# Patient Record
Sex: MALE | Race: WHITE | NOT HISPANIC OR LATINO | Employment: OTHER | URBAN - METROPOLITAN AREA
[De-identification: names, ages, dates, MRNs, and addresses within clinical notes are randomized per-mention and may not be internally consistent; named-entity substitution may affect disease eponyms.]

---

## 2017-02-21 ENCOUNTER — OFFICE VISIT (OUTPATIENT)
Dept: MEDICAL GROUP | Facility: MEDICAL CENTER | Age: 72
End: 2017-02-21
Payer: MEDICARE

## 2017-02-21 VITALS
BODY MASS INDEX: 25.31 KG/M2 | WEIGHT: 191 LBS | SYSTOLIC BLOOD PRESSURE: 136 MMHG | DIASTOLIC BLOOD PRESSURE: 80 MMHG | HEIGHT: 73 IN | TEMPERATURE: 97.9 F | OXYGEN SATURATION: 95 % | HEART RATE: 83 BPM

## 2017-02-21 DIAGNOSIS — R94.5 ABNORMAL LIVER FUNCTION: ICD-10-CM

## 2017-02-21 DIAGNOSIS — E11.9 CONTROLLED TYPE 2 DIABETES MELLITUS WITHOUT COMPLICATION, WITH LONG-TERM CURRENT USE OF INSULIN (HCC): Chronic | ICD-10-CM

## 2017-02-21 DIAGNOSIS — I10 ESSENTIAL HYPERTENSION: Chronic | ICD-10-CM

## 2017-02-21 DIAGNOSIS — Z87.19 HISTORY OF GASTROINTESTINAL BLEEDING: ICD-10-CM

## 2017-02-21 DIAGNOSIS — Z79.4 CONTROLLED TYPE 2 DIABETES MELLITUS WITHOUT COMPLICATION, WITH LONG-TERM CURRENT USE OF INSULIN (HCC): Chronic | ICD-10-CM

## 2017-02-21 PROCEDURE — 3045F PR MOST RECENT HEMOGLOBIN A1C LEVEL 7.0-9.0%: CPT | Performed by: INTERNAL MEDICINE

## 2017-02-21 PROCEDURE — G8484 FLU IMMUNIZE NO ADMIN: HCPCS | Performed by: INTERNAL MEDICINE

## 2017-02-21 PROCEDURE — 1101F PT FALLS ASSESS-DOCD LE1/YR: CPT | Performed by: INTERNAL MEDICINE

## 2017-02-21 PROCEDURE — 3017F COLORECTAL CA SCREEN DOC REV: CPT | Mod: 1P | Performed by: INTERNAL MEDICINE

## 2017-02-21 PROCEDURE — 92250 FUNDUS PHOTOGRAPHY W/I&R: CPT | Mod: TC | Performed by: INTERNAL MEDICINE

## 2017-02-21 PROCEDURE — G8432 DEP SCR NOT DOC, RNG: HCPCS | Performed by: INTERNAL MEDICINE

## 2017-02-21 PROCEDURE — 1036F TOBACCO NON-USER: CPT | Performed by: INTERNAL MEDICINE

## 2017-02-21 PROCEDURE — G8420 CALC BMI NORM PARAMETERS: HCPCS | Performed by: INTERNAL MEDICINE

## 2017-02-21 PROCEDURE — 4040F PNEUMOC VAC/ADMIN/RCVD: CPT | Performed by: INTERNAL MEDICINE

## 2017-02-21 PROCEDURE — 99214 OFFICE O/P EST MOD 30 MIN: CPT | Performed by: INTERNAL MEDICINE

## 2017-02-21 NOTE — MR AVS SNAPSHOT
"        Vinod Pablo Houston   2017 8:00 AM   Office Visit   MRN: [id number]    Department:  [facility]   Dept Phone:  [phone]    Description:  Male : 1945   Provider:  Jamal Nieves M.D.           Allergies as of 2017     Allergen Noted Reactions    Aspirin 2013   Anaphylaxis    INTERNAL BLEEDING    Augmentin 2010   Vomiting    Clindamycin 2011       Develop c.diff    Metronidazole 2013       Not sure    Nsaids 2009         You were diagnosed with     Controlled type 2 diabetes mellitus without complication, with long-term current use of insulin (CMS-HCC)   [6848591]       Essential hypertension   [8534735]       Abnormal liver function   [097640]       History of gastrointestinal bleeding   [881724]         Vital Signs     Blood Pressure Pulse Temperature Height Weight Body Mass Index    136/80 mmHg 83 36.6 °C (97.9 °F) 1.854 m (6' 0.99\") 86.637 kg (191 lb) 25.20 kg/m2    Oxygen Saturation Smoking Status                95% Former Smoker          Basic Information     Date Of Birth Sex Race Ethnicity Preferred Language    1945 Male White Non- English      Problem List              ICD-10-CM Priority Class Noted - Resolved    Hypertension (Chronic) I10   2009 - Present    Dyslipidemia (Chronic) E78.5   2009 - Present    Diabetes mellitus type 2, controlled (CMS-HCC) (Chronic) E11.9   2009 - Present    History of gastrointestinal bleeding Z87.19   2009 - Present    S/P knee surgery Z98.890   2009 - Present    Preventative health care (Chronic) Z00.00   2009 - Present    Retinal detachment H33.20   2009 - Present    History of Clostridium difficile infection Z86.19   2011 - Present    Arthritis (Chronic) M19.90   2012 - Present    History of cervical spinal surgery Z98.890   2013 - Present    Low back pain M54.5   2013 - Present    Erectile dysfunction N52.9   2014 - Present   " Abnormal liver function K76.89   8/9/2015 - Present    Hearing loss of aging H91.10   9/8/2015 - Present      Health Maintenance        Date Due Completion Dates    RETINAL SCREENING 3/10/1962 ---    IMM ZOSTER VACCINE 3/10/2004 ---    IMM DTaP/Tdap/Td Vaccine (1 - Tdap) 4/6/2010 4/5/2010    IMM PNEUMOCOCCAL 65+ (ADULT) LOW/MEDIUM RISK SERIES (2 of 2 - PCV13) 9/30/2011 9/30/2010    IMM INFLUENZA (1) 9/1/2016 ---    A1C SCREENING 4/18/2017 10/18/2016, 8/6/2015, 9/24/2014, 1/20/2014, 9/20/2013, 12/3/2012, 11/6/2011, 10/14/2010, 3/5/2010, 9/2/2009    FASTING LIPID PROFILE 10/18/2017 10/18/2016, 8/6/2015, 9/24/2014, 9/20/2013, 12/3/2012, 10/14/2010, 9/2/2009, 8/15/2008    URINE ACR / MICROALBUMIN 10/18/2017 10/18/2016, 1/20/2014, 9/2/2009, 8/15/2008    SERUM CREATININE 10/18/2017 10/18/2016, 10/22/2015, 8/6/2015, 9/24/2014, 1/20/2014, 9/20/2013, 5/17/2013, 12/3/2012, 11/7/2011, 11/6/2011, 11/5/2011, 10/14/2010, 3/5/2010, 9/2/2009, 1/12/2009, 8/15/2008    DIABETES MONOFILAMENT / LE EXAM 10/20/2017 10/20/2016, 8/25/2015, 9/8/2014, 9/12/2013 (Done)    Override on 9/12/2013: Done    COLONOSCOPY 8/25/2025 8/25/2015 (Declined)    Override on 8/25/2015: Patient Declined            Current Immunizations     Pneumococcal polysaccharide vaccine (PPSV-23) 9/30/2010, 9/30/2010    TD Vaccine 4/5/2010  4:20 PM      Below and/or attached are the medications your provider expects you to take. Review all of your home medications and newly ordered medications with your provider and/or pharmacist. Follow medication instructions as directed by your provider and/or pharmacist. Please keep your medication list with you and share with your provider. Update the information when medications are discontinued, doses are changed, or new medications (including over-the-counter products) are added; and carry medication information at all times in the event of emergency situations     Allergies:  ASPIRIN - Anaphylaxis     AUGMENTIN - Vomiting      CLINDAMYCIN - (reactions not documented)     METRONIDAZOLE - (reactions not documented)     NSAIDS - (reactions not documented)               Medications  Valid as of: February 21, 2017 -  9:49 AM    Generic Name Brand Name Tablet Size Instructions for use    Amlodipine Besy-Benazepril HCl (Cap) LOTREL 5-20 MG Take 1 Cap by mouth every day.        Atorvastatin Calcium (Tab) LIPITOR 20 MG Take 1 Tab by mouth every day.        Blood Glucose Monitoring Suppl (Device) Blood Glucose Monitoring Suppl  One Touch ultra glucometer, check blood sugar once a day and record before meal, E11.9        Blood Glucose Monitoring Suppl (Misc) Blood Glucose Monitoring Suppl SUPPLIES One touch ultra glucometer test strips, check blood sugar once a day before meal and record, E11.9        Lancets (Misc) Lancets  Check blood sugar once a day before meals and record, E11.9        Triamterene-HCTZ (Cap) MAXZIDE-25/DYAZIDE 37.5-25 MG Take 1 Cap by mouth every morning.        .                 Medicines prescribed today were sent to:     [id number] [place] [facility] [address] [email or web address]    [address] [place] [address]    Phone: [phone] Fax: [phone]    Open 24 Hours?: No      Medication refill instructions:       If your prescription bottle indicates you have medication refills left, it is not necessary to call your provider’s office. Please contact your pharmacy and they will refill your medication.    If your prescription bottle indicates you do not have any refills left, you may request refills at any time through one of the following ways: The online [facility] system (except Urgent Care), by calling your provider’s office, or by asking your pharmacy to contact your provider’s office with a refill request. Medication refills are processed only during regular business hours and may not be available until the next business day. Your provider may request additional information or to have a follow-up visit with you prior to  refilling your medication.   *Please Note: Medication refills are assigned a new Rx number when refilled electronically. Your pharmacy may indicate that no refills were authorized even though a new prescription for the same medication is available at the pharmacy. Please request the medicine by name with the pharmacy before contacting your provider for a refill.        Your To Do List     Future Labs/Procedures Complete By Expires    COMP METABOLIC PANEL  As directed 2/22/2018    FERRITIN  As directed 2/22/2018    HEMOGLOBIN A1C  As directed 2/22/2018    HEPATITIS PANEL ACUTE ([facility])  As directed 2/22/2018    IRON/TOTAL IRON BIND  As directed 2/22/2018    MICROALBUMIN CREAT RATIO URINE  As directed 2/22/2018    SERUM PROTEIN ELECTROPHORESIS  As directed 2/22/2018    URINALYSIS,CULTURE IF INDICATED  As directed 2/22/2018      Other Notes About Your Plan     10/18/16 AST 43,ALT 62, prot,A1c 7%  1/17 repeat A1c, secondary liver enzyme workup, repeat UA                               MyChart Status: Patient Declined

## 2017-02-21 NOTE — PROGRESS NOTES
Subjective:      Vinod Conrad is a 71 y.o. male who presents with diabetes          HPI     Here for follow diabetes, checking blood sugars once daily running 107-120 at home, once per day, has been to diabetic education classes, has improved his diet, limiting carbohydrate portion sizes, eating healthier, limiting sweets and candies, 2 drinks of alcohol per day  Blood pressure running 130/80 on lotrel and maxzide no lightheadedness or dizziness  Exercising on bike 5 miles per day, no chest pain, shortness of breath, lightheadedness, syncope, palpitations with activity  Dyslipidemia on Lipitor no muscle pain or muscle aches   Not on aspirin because of history of GI bleed, no further melena, hematochezia, no NSAIDs  No tobacco  etoh 1-2 per day            Current Outpatient Prescriptions   Medication Sig Dispense Refill   • atorvastatin (LIPITOR) 20 MG Tab Take 1 Tab by mouth every day. 90 Tab 3   • amlodipine-benazepril (LOTREL) 5-20 MG per capsule Take 1 Cap by mouth every day. 90 Cap 3   • Lancets Misc Check blood sugar once a day before meals and record, E11.9 50 Each 11   • Blood Glucose Monitoring Suppl Device One Touch ultra glucometer, check blood sugar once a day and record before meal, E11.9 1 Device 0   • Blood Glucose Monitoring Suppl SUPPLIES Misc One touch ultra glucometer test strips, check blood sugar once a day before meal and record, E11.9 50 Strip 11   • triamterene/hctz (MAXZIDE-25/DYAZIDE) 37.5-25 MG Cap Take 1 Cap by mouth every morning. 90 Cap 3   • atorvastatin (LIPITOR) 20 MG Tab Take 1 Tab by mouth every day. 90 Tab 3   • amlodipine-benazepril (LOTREL) 5-20 MG per capsule Take 1 Cap by mouth every day. 90 Cap 3     No current facility-administered medications for this visit.     Patient Active Problem List    Diagnosis Date Noted   • Hearing loss of aging 09/08/2015   • Abnormal liver function 08/09/2015   • Erectile dysfunction 09/08/2014   • Low back pain 09/25/2013   • History of  cervical spinal surgery 05/13/2013   • Arthritis 11/13/2012   • History of Clostridium difficile infection 11/17/2011   • Hypertension 09/12/2009   • Dyslipidemia 09/12/2009   • Diabetes mellitus type 2, controlled (CMS-[facility]) 09/12/2009   • History of gastrointestinal bleeding 09/12/2009   • S/P knee surgery 09/12/2009   • Preventative health care 09/12/2009   • Retinal detachment 09/12/2009         Status post knee surgery  1990 and 2005 left knee meniscus  12/12 [facility] note; x-ray left knee severe DJD, x-ray right knee joint narrowing medial, lateral, patellofemoral compartment, status post bilateral knee injection    Preventive health  2007 ? Colon [place] state repeat 2015?  4/5/10 td  9/30/10 pneumovax  2010 eye exam  8/25/15 zoster vaccine written to get at pharmacy  8/25/15 prevnar written to get at pharmacy  8/25/15 declines colon  11/5/15 declines flu shot  10/18/16 psa 2.2  10/18/16 vit d 31    Low back pain  9/25/13 xray right hip mild DJD  9/25/13 xray lumbar spine facet disease most prominent at L4-5 and L5-S1 on the right, mild degenerative change in the SI joints  9/25/13 MRI lumbar spine L3-L4 circumferential disc bulge, bilateral facet arthropathy narrowing thecal sac to 7 mm, L4-L5 concentric disc bulge narrowing thecal sac to 6 mm, moderate bilateral foraminal narrowing right greater than left  9/25/13 patient's symptoms improving, declines physiatry,proceed with physical therapy at [facility] physical therapy    Hypertension  8/08 urine mac 11  9/09 urine mac 14  11/11 on lotrel 5/20 mg and diazide  1/20/14 urine mac 8, on lotrel 5/20 mg and diazide    10/18/16 urine mac 10 on lotrel 5/20 mg and diazide     History GI bleed  6/07 UGI bleed and hemoperitoneum, s/p exploratory laparotomy and evacuation hemoperitoneum, no obvious source of bleeding this was done in [place], prior to this he had been taking aspirin and anti-inflammatories thus was told to discontinue    History C. difficile  infection  11/3/11 c.diff stool positive start flagyl  11/8/11 on vancomycin on discharge from hospital, completed 2 weeks  11/30/11 stool negative for c.diff toxin    History cervical spine surgery  5/13/13 MRI cervical spine C3-C4 mild DJD, C5-C6 eccentric spurring and disc bulge with effacement of CSF space right of midline, bilateral uncinate and facet degeneration, C6-C7 large right central disc extrusion and severe right foraminal narrowing  5/17/13  neurosurgery note surgery recommended  5/20/13  C6-C7 foraminotomy, excision herniated disc, decompression C7 nerve    Hearing loss  8/31/15  ENT note age-related hearing loss, recommend hearing aids    Dyslipidemia  8/08 chol 172,trig 49,hdl 64,ldl 98  9/09 chol 192,trig 98,hdl 59,ldl 113  10/10 chol 164,trig 76,hdl 50,ldl 99 lipitor 20 mg  11/12 chol 176,trig 99,hdl 55,ldl 101; cpk 74,crp 7.8 on lipitor 20 mg  9/20/13 chol 150,trig 92,hdl 42,ldl 90 on lipitor 20 mg  9/24/14 chol 161,trig 90,hdl 43,ldl 100 on lipitor 20 mg  8/8/15 chol 167,trig 86,hdl 44,ldl 106 on lipitor 20 mg  10/18/16 chol 164,trig 107,hdl 41,ldl 102 on lipitor 20 mg    Diabetes  8/08 bs 129  9/09 bs 143,A1c 7%, declines more diabetic education like to hold off on checking blood sugars for now recheck labs of December if still elevated start metformin  3/10 A1c 6.2%  10/10 A1c 6.4%,bs 129  11/11 A1c 6.2% no meds  11/12 A1c 6.6%,bs 147 no meds  9/20/13 A1c 7.0 %,bs 118 diet controlled  11/15/13 declines to check blood sugar, declines glucometer, declines diabetic education.  1/20/14 A1c 6.4% diet controlled  9/25/14 A1c 6.0% diet controlled  8/8/15 A1c 6.7% diet controlled, on lotrel, lipitor, no asa secondary to previous GI bleed  10/18/16 A1c 7%  11/15/16 diabetic education performed    Arthritis  2007 saw a rheumatologist in [place], multiple x-rays and labs negative, told osteoarthritis, was taking aspirin up to 9 tablets a day and developed GI bleed  11/12  "osteoathritis knees, shoulders, hands, trial of tramadol  11/12 crp 7.8,ESR 1,cpk 74,uric 6.8,CCP 8  12/12 [facility] note; x-ray left knee severe DJD, x-ray right knee joint narrowing medial, lateral, patellofemoral compartment, status post bilateral knee injection    Abn liver enzymes  9/24/14 AST 45,ALT 65  8/8/15 AST 29,ALT 55,GGT 42,iron 111,%sat 29,ferritin 261,SPEP negative,hep panel negative; 2 drinks alcohol daily, tylenol 4 tablets daily  10/22/15 AST 32,ALT 33  10/18/16 AST 43,ALT 62        ROS       Objective:     /80 mmHg  Pulse 83  Temp(Src) 36.6 °C (97.9 °F)  Resp 95  Ht 1.854 m (6' 1\")  Wt 86.637 kg (191 lb)  BMI 25.20 kg/m2     Physical Exam   Constitutional: He appears well-developed and well-nourished. No distress.   HENT:   Head: Normocephalic and atraumatic.   Mouth/Throat: Oropharynx is clear and moist.   Eyes: Conjunctivae are normal. Right eye exhibits no discharge. Left eye exhibits no discharge. No scleral icterus.   Neck: Neck supple. No JVD present. No thyromegaly present.   Cardiovascular: Normal rate and regular rhythm.    No murmur heard.  Pulmonary/Chest: Effort normal and breath sounds normal. No respiratory distress. He has no wheezes.   Abdominal: Soft.   Musculoskeletal: He exhibits no edema.   Neurological: He is alert.   Skin: Skin is warm. He is not diaphoretic.   Psychiatric: He has a normal mood and affect. His behavior is normal.   Nursing note and vitals reviewed.              Assessment/Plan:     Assessment  #!  Diabetes mellitus diet controlled, blood sugars improved, has been to diabetic education, checking once a day, limiting sweets and candies, exercising daily, no history of retinopathy, neuropathy, nephropathy    #2 hypertension stable and controlled on Lotrel and Maxzide    #3 dyslipidemia on Lipitor no muscle pain or aches    #4 history of GI bleed unable to take aspirin daily    #5 abnormal liver enzymes likely secondary to alcohol most recent labs 10/18/16 " AST 43,ALT 62    #6 proteinuria microscopic question related to hypertension or diabetes, normal BUN and creatinine, no regular NSAIDs      Plan  #1 point-of-care retina examination    #2 labs repeat    #3 limit alcohol may impact liver enzymes    #4 nutrition and exercise discussed    #5 continue check blood pressure regularly and record, check blood sugar once a day regularly and record    #6 follow up 6 months    #7 check feet daily    #8 annual eye exam    #9 unable to take aspirin because of history of GI bleed

## 2017-02-24 ENCOUNTER — TELEPHONE (OUTPATIENT)
Dept: MEDICAL GROUP | Facility: MEDICAL CENTER | Age: 72
End: 2017-02-24

## 2017-02-24 NOTE — TELEPHONE ENCOUNTER
----- Message from Jamal Nieves M.D. sent at 2/23/2017 11:06 PM PST -----  Please notify patient that his digital retina photo is negative for diabetic eye disease

## 2017-02-28 ENCOUNTER — HOSPITAL ENCOUNTER (OUTPATIENT)
Dept: LAB | Facility: MEDICAL CENTER | Age: 72
End: 2017-02-28
Attending: INTERNAL MEDICINE
Payer: MEDICARE

## 2017-02-28 LAB
ALBUMIN SERPL BCP-MCNC: 4.3 G/DL (ref 3.2–4.9)
ALBUMIN/GLOB SERPL: 1.4 G/DL
ALP SERPL-CCNC: 60 U/L (ref 30–99)
ALT SERPL-CCNC: 29 U/L (ref 2–50)
ANION GAP SERPL CALC-SCNC: 9 MMOL/L (ref 0–11.9)
APPEARANCE UR: CLEAR
AST SERPL-CCNC: 22 U/L (ref 12–45)
BILIRUB SERPL-MCNC: 0.8 MG/DL (ref 0.1–1.5)
BILIRUB UR QL STRIP.AUTO: NEGATIVE
BUN SERPL-MCNC: 19 MG/DL (ref 8–22)
CALCIUM SERPL-MCNC: 10.3 MG/DL (ref 8.5–10.5)
CHLORIDE SERPL-SCNC: 105 MMOL/L (ref 96–112)
CO2 SERPL-SCNC: 28 MMOL/L (ref 20–33)
COLOR UR AUTO: NORMAL
CREAT SERPL-MCNC: 0.88 MG/DL (ref 0.5–1.4)
CREAT UR-MCNC: 133.2 MG/DL
CULTURE IF INDICATED INDCX: NO UA CULTURE
EST. AVERAGE GLUCOSE BLD GHB EST-MCNC: 134 MG/DL
FERRITIN SERPL-MCNC: 208.1 NG/ML (ref 22–322)
GLOBULIN SER CALC-MCNC: 3.1 G/DL (ref 1.9–3.5)
GLUCOSE SERPL-MCNC: 134 MG/DL (ref 65–99)
GLUCOSE UR STRIP.AUTO-MCNC: NEGATIVE MG/DL
HAV IGM SERPL QL IA: NEGATIVE
HBA1C MFR BLD: 6.3 % (ref 0–5.6)
HBV CORE IGM SERPL QL IA: NEGATIVE
HBV SURFACE AG SERPL QL IA: NEGATIVE
HCV AB S/CO SERPL IA: NEGATIVE
IRON SATN MFR SERPL: 28 % (ref 15–55)
IRON SERPL-MCNC: 114 UG/DL (ref 50–180)
KETONES UR STRIP.AUTO-MCNC: NEGATIVE MG/DL
LEUKOCYTE ESTERASE UR QL STRIP.AUTO: NEGATIVE
MICRO URNS: NORMAL
MICROALBUMIN UR-MCNC: 1.1 MG/DL
MICROALBUMIN/CREAT UR: 8 MG/G (ref 0–30)
NITRITE UR QL STRIP.AUTO: NEGATIVE
PH UR: 6 [PH]
POTASSIUM SERPL-SCNC: 3.9 MMOL/L (ref 3.6–5.5)
PROT SERPL-MCNC: 7.4 G/DL (ref 6–8.2)
PROT UR QL STRIP: NEGATIVE MG/DL
RBC UR QL AUTO: NEGATIVE
SODIUM SERPL-SCNC: 142 MMOL/L (ref 135–145)
SP GR UR STRIP.AUTO: 1.01
TIBC SERPL-MCNC: 402 UG/DL (ref 250–450)

## 2017-02-28 PROCEDURE — 83540 ASSAY OF IRON: CPT

## 2017-02-28 PROCEDURE — 84165 PROTEIN E-PHORESIS SERUM: CPT

## 2017-02-28 PROCEDURE — 82043 UR ALBUMIN QUANTITATIVE: CPT

## 2017-02-28 PROCEDURE — 84160 ASSAY OF PROTEIN ANY SOURCE: CPT

## 2017-02-28 PROCEDURE — 80074 ACUTE HEPATITIS PANEL: CPT

## 2017-02-28 PROCEDURE — 82728 ASSAY OF FERRITIN: CPT

## 2017-02-28 PROCEDURE — 36415 COLL VENOUS BLD VENIPUNCTURE: CPT

## 2017-02-28 PROCEDURE — 81003 URINALYSIS AUTO W/O SCOPE: CPT

## 2017-02-28 PROCEDURE — 83036 HEMOGLOBIN GLYCOSYLATED A1C: CPT | Mod: GA

## 2017-02-28 PROCEDURE — 82570 ASSAY OF URINE CREATININE: CPT

## 2017-02-28 PROCEDURE — 83550 IRON BINDING TEST: CPT

## 2017-02-28 PROCEDURE — 80053 COMPREHEN METABOLIC PANEL: CPT

## 2017-03-01 ENCOUNTER — TELEPHONE (OUTPATIENT)
Dept: MEDICAL GROUP | Facility: MEDICAL CENTER | Age: 72
End: 2017-03-01

## 2017-03-01 NOTE — TELEPHONE ENCOUNTER
----- Message from Jamal Nieves M.D. sent at 3/1/2017  2:10 AM PST -----  Please notify patient that  (1) his diabetes blood sugar average A1c is 6.3% which is much better from his previous test 4 months ago indicating better blood sugar control, continue his current diet and exercise program  (2) his liver function is normal again, his kidney function and urine test are normal  (3) we can repeat his blood test in october

## 2017-03-03 LAB
ALBUMIN SERPL-MCNC: 4.38 G/DL (ref 3.75–5.01)
ALPHA1 GLOB SERPL ELPH-MCNC: 0.29 G/DL (ref 0.19–0.46)
ALPHA2 GLOB SERPL ELPH-MCNC: 0.77 G/DL (ref 0.48–1.05)
B-GLOBULIN SERPL ELPH-MCNC: 0.8 G/DL (ref 0.48–1.1)
EER PROT ELECT SER Q1092: NORMAL
GAMMA GLOB SERPL ELPH-MCNC: 1.06 G/DL (ref 0.62–1.51)
INTERPRETATION SERPL IFE-IMP: NORMAL
PROT SERPL-MCNC: 7.3 G/DL (ref 6–8.3)

## 2017-09-26 ENCOUNTER — TELEPHONE (OUTPATIENT)
Dept: MEDICAL GROUP | Facility: MEDICAL CENTER | Age: 72
End: 2017-09-26

## 2017-09-26 DIAGNOSIS — Z79.4 CONTROLLED TYPE 2 DIABETES MELLITUS WITHOUT COMPLICATION, WITH LONG-TERM CURRENT USE OF INSULIN (HCC): Chronic | ICD-10-CM

## 2017-09-26 DIAGNOSIS — Z12.5 PROSTATE CANCER SCREENING: ICD-10-CM

## 2017-09-26 DIAGNOSIS — E11.9 CONTROLLED TYPE 2 DIABETES MELLITUS WITHOUT COMPLICATION, WITH LONG-TERM CURRENT USE OF INSULIN (HCC): Chronic | ICD-10-CM

## 2017-09-26 DIAGNOSIS — E55.9 HYPOVITAMINOSIS D: ICD-10-CM

## 2017-09-26 DIAGNOSIS — E78.5 DYSLIPIDEMIA: Chronic | ICD-10-CM

## 2017-09-26 NOTE — TELEPHONE ENCOUNTER
Please notify patient that his pharmacy has sent us a notification that the medication he is taking, the triamterene/hydrochlorothiazide, is no longer available, we can change the patient to hydrochlorothiazide which would still be one tablet a day, if he is in agreement please let me know.    We will need to check his blood tests in 4 weeks after the change of the medication.

## 2017-09-27 RX ORDER — HYDROCHLOROTHIAZIDE 25 MG/1
25 TABLET ORAL DAILY
Qty: 90 TAB | Refills: 3 | Status: SHIPPED | OUTPATIENT
Start: 2017-09-27 | End: 2017-09-28

## 2017-09-27 NOTE — TELEPHONE ENCOUNTER
Patient called back again and left a message, could you please call him again and check on his question?

## 2017-09-28 RX ORDER — TRIAMTERENE AND HYDROCHLOROTHIAZIDE 37.5; 25 MG/1; MG/1
1 TABLET ORAL DAILY
Qty: 90 TAB | Refills: 3 | Status: ON HOLD | OUTPATIENT
Start: 2017-09-28 | End: 2018-08-17

## 2017-09-28 NOTE — TELEPHONE ENCOUNTER
Please notify patient that the triamterene hydrochlorothiazide tablet prescription was sent to his pharmacy, we will cancel the other prescription

## 2017-09-28 NOTE — TELEPHONE ENCOUNTER
Pt is requesting to stay on the previous medication and states his Pharmacy does have this medication but in Tablet form. [facility] will need a new Rx to show Tablet form for Triamterene/Hydrochlorothiazide.

## 2018-01-15 ENCOUNTER — OFFICE VISIT (OUTPATIENT)
Dept: URGENT CARE | Facility: CLINIC | Age: 73
End: 2018-01-15
Payer: MEDICARE

## 2018-01-15 VITALS
SYSTOLIC BLOOD PRESSURE: 142 MMHG | HEIGHT: 72 IN | RESPIRATION RATE: 14 BRPM | BODY MASS INDEX: 27.5 KG/M2 | WEIGHT: 203 LBS | HEART RATE: 73 BPM | TEMPERATURE: 98.2 F | OXYGEN SATURATION: 96 % | DIASTOLIC BLOOD PRESSURE: 72 MMHG

## 2018-01-15 DIAGNOSIS — L03.211 CELLULITIS, FACE: ICD-10-CM

## 2018-01-15 PROCEDURE — 99214 OFFICE O/P EST MOD 30 MIN: CPT | Performed by: NURSE PRACTITIONER

## 2018-01-15 RX ORDER — SULFAMETHOXAZOLE AND TRIMETHOPRIM 800; 160 MG/1; MG/1
1 TABLET ORAL 2 TIMES DAILY
Qty: 14 TAB | Refills: 0 | Status: SHIPPED | OUTPATIENT
Start: 2018-01-15 | End: 2018-01-22

## 2018-01-15 ASSESSMENT — ENCOUNTER SYMPTOMS
MYALGIAS: 0
FEVER: 0
NAUSEA: 0
ROS SKIN COMMENTS: CYST
VOMITING: 0

## 2018-01-15 NOTE — PROGRESS NOTES
"Subjective:      Vinod Conrad is a 72 y.o. male who presents with Other (possible cyst)    Past Medical History:   Diagnosis Date   • Allergy    • Anesthesia 2006    hallucinations post anesthesia, at home   • Arthritis     all joints   • CATARACT     bilateral IOL   • Hyperlipidemia    • Hypertension    • Infectious disease 2011    c diff   • Pain    • Unspecified hemorrhagic conditions 2006    \"abd bleeding\"     Social History     Social History   • Marital status:      Spouse name: N/A   • Number of children: N/A   • Years of education: N/A     Occupational History   • Not on file.     Social History Main Topics   • Smoking status: Former Smoker     Packs/day: 2.00     Years: 30.00     Types: Cigarettes     Quit date: 1/1/1998   • Smokeless tobacco: Never Used   • Alcohol use 0.0 oz/week      Comment: 5 per week   • Drug use: No   • Sexual activity: Not on file     Other Topics Concern   • Not on file     Social History Narrative   • No narrative on file     History reviewed. No pertinent family history.    Allergies: Aspirin; Augmentin; Clindamycin; Metronidazole; and Nsaids              Other   This is a new problem. The current episode started in the past 7 days. The problem occurs constantly. The problem has been unchanged. Pertinent negatives include no fever, myalgias, nausea or vomiting. Nothing aggravates the symptoms. He has tried nothing for the symptoms. The treatment provided no relief.       Review of Systems   Constitutional: Negative for fever and malaise/fatigue.   Gastrointestinal: Negative for nausea and vomiting.   Musculoskeletal: Negative for myalgias.   Skin:        cyst   All other systems reviewed and are negative.         Objective:     /72   Pulse 73   Temp 36.8 °C (98.2 °F)   Resp 14   Ht 1.829 m (6')   Wt 92.1 kg (203 lb)   SpO2 96%   BMI 27.53 kg/m²      Physical Exam   Constitutional: He is oriented to person, place, and time. He appears well-developed and " well-nourished.   HENT:   Head:       2 cm painful firm induration to the right side of the chin. Non-fluctuant.    Cardiovascular: Normal rate and regular rhythm.    Musculoskeletal: Normal range of motion.   Neurological: He is alert and oriented to person, place, and time.   Skin: Skin is warm and dry.   Psychiatric: He has a normal mood and affect. His behavior is normal. Judgment and thought content normal.   Vitals reviewed.    Patient refused fingerstick BG; states he has not checked this in over a week but has had a history of being well controlled. I did advise the patient that infection may cause his glucose to be elevated and I strongly advised him to monitor this as he will not agree to having it checked today. Patient verbalized understanding and agreement with plan of care.           Assessment/Plan:   Facial cellulitis   -warm compresses   -BActrim DS    -ER precautions for increasing size or pain of affected area   -Monitor blood glucose, ER precautions for BG >400 mg dL  There are no diagnoses linked to this encounter.

## 2018-03-27 ENCOUNTER — OFFICE VISIT (OUTPATIENT)
Dept: URGENT CARE | Facility: CLINIC | Age: 73
End: 2018-03-27
Payer: MEDICARE

## 2018-03-27 VITALS
BODY MASS INDEX: 27.63 KG/M2 | WEIGHT: 204 LBS | HEART RATE: 67 BPM | SYSTOLIC BLOOD PRESSURE: 124 MMHG | DIASTOLIC BLOOD PRESSURE: 78 MMHG | HEIGHT: 72 IN | RESPIRATION RATE: 12 BRPM | TEMPERATURE: 98.2 F | OXYGEN SATURATION: 95 %

## 2018-03-27 DIAGNOSIS — L03.211 FACIAL CELLULITIS: ICD-10-CM

## 2018-03-27 PROCEDURE — 99214 OFFICE O/P EST MOD 30 MIN: CPT | Performed by: PHYSICIAN ASSISTANT

## 2018-03-27 RX ORDER — SULFAMETHOXAZOLE AND TRIMETHOPRIM 800; 160 MG/1; MG/1
1 TABLET ORAL 2 TIMES DAILY
Qty: 14 TAB | Refills: 0 | Status: SHIPPED | OUTPATIENT
Start: 2018-03-27 | End: 2018-04-03

## 2018-03-27 ASSESSMENT — PATIENT HEALTH QUESTIONNAIRE - PHQ9: CLINICAL INTERPRETATION OF PHQ2 SCORE: 0

## 2018-03-27 ASSESSMENT — ENCOUNTER SYMPTOMS
ABDOMINAL PAIN: 0
VOMITING: 0
SENSORY CHANGE: 0
FOCAL WEAKNESS: 0
DIARRHEA: 0
MYALGIAS: 0
NAUSEA: 0
FEVER: 0
CHILLS: 0
TINGLING: 0

## 2018-03-27 NOTE — PROGRESS NOTES
"Subjective:      Vinod Conrad is a 73 y.o. male who presents with Cyst            Patient is here today with complaints of inflamed cyst on right chin. 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 He complains of swelling and pain. He denies fever or chills. Patient has a follow-up appointment with Dermatologist in May.      Past Medical History:   Diagnosis Date   • Allergy    • Anesthesia 2006    hallucinations post anesthesia, at home   • Arthritis     all joints   • CATARACT     bilateral IOL   • Hyperlipidemia    • Hypertension    • Infectious disease 2011    c diff   • Pain    • Unspecified hemorrhagic conditions 2006    \"abd bleeding\"       Past Surgical History:   Procedure Laterality Date   • CERVICAL FORAMINOTOMY  5/20/2013    Performed by Krish Yang M.D. at SURGERY [facility] ORS   • OTHER  2010    torn retina RIGHT EYE   • SCLERAL BUCKLING  1/13/2009    Performed by YVETTE DRAPER at SURGERY SAME DAY [facility] ORS   • OTHER ORTHOPEDIC SURGERY  2005    l knee       History reviewed. No pertinent family history.    Allergies   Allergen Reactions   • Aspirin Anaphylaxis     INTERNAL BLEEDING   • Augmentin Vomiting   • Clindamycin      Develop c.diff   • Metronidazole      Not sure   • Nsaids    • Vancomycin        Medications, Allergies, and current problem list reviewed today in Epic      Review of Systems   Constitutional: Negative for chills, fever and malaise/fatigue.   Gastrointestinal: Negative for abdominal pain, diarrhea, nausea and vomiting.   Musculoskeletal: Negative for myalgias.   Skin:        Right lower lip edema and erythema    Neurological: Negative for tingling, sensory change and focal weakness.     All other systems reviewed and are negative.          Objective:     /78   Pulse 67   Temp 36.8 °C (98.2 °F)   Resp 12   Ht 1.829 m (6')   Wt 92.5 kg (204 lb)   SpO2 95%   BMI 27.67 kg/m²      Physical Exam   Constitutional: He is oriented to person, place, " and time. He appears well-developed and well-nourished. No distress.   HENT:   Head: Normocephalic and atraumatic.       Pulmonary/Chest: Effort normal. No respiratory distress.   Neurological: He is alert and oriented to person, place, and time. No cranial nerve deficit.   Psychiatric: He has a normal mood and affect. His behavior is normal. Judgment and thought content normal.               Assessment/Plan:     1. Facial cellulitis    - sulfamethoxazole-trimethoprim (BACTRIM DS) 800-160 MG tablet; Take 1 Tab by mouth 2 times a day for 7 days.  Dispense: 14 Tab; Refill: 0    Follow-up with Dermatologist.     Differential diagnoses, Supportive care, and indications for immediate follow-up discussed with patient.   Instructed to return to clinic or nearest emergency department for any change in condition, further concerns, or worsening of symptoms.    The patient demonstrated a good understanding and agreed with the treatment plan.    Sandra Gutierrez P.A.-C.

## 2018-04-04 ENCOUNTER — APPOINTMENT (RX ONLY)
Dept: URBAN - METROPOLITAN AREA CLINIC 4 | Facility: CLINIC | Age: 73
Setting detail: DERMATOLOGY
End: 2018-04-04

## 2018-04-04 DIAGNOSIS — Z85.828 PERSONAL HISTORY OF OTHER MALIGNANT NEOPLASM OF SKIN: ICD-10-CM

## 2018-04-04 DIAGNOSIS — L82.1 OTHER SEBORRHEIC KERATOSIS: ICD-10-CM

## 2018-04-04 DIAGNOSIS — L81.4 OTHER MELANIN HYPERPIGMENTATION: ICD-10-CM

## 2018-04-04 DIAGNOSIS — L72.8 OTHER FOLLICULAR CYSTS OF THE SKIN AND SUBCUTANEOUS TISSUE: ICD-10-CM

## 2018-04-04 PROBLEM — I10 ESSENTIAL (PRIMARY) HYPERTENSION: Status: ACTIVE | Noted: 2018-04-04

## 2018-04-04 PROBLEM — E78.5 HYPERLIPIDEMIA, UNSPECIFIED: Status: ACTIVE | Noted: 2018-04-04

## 2018-04-04 PROCEDURE — ? COUNSELING

## 2018-04-04 PROCEDURE — 99213 OFFICE O/P EST LOW 20 MIN: CPT

## 2018-04-04 PROCEDURE — ? OBSERVATION

## 2018-04-04 PROCEDURE — ? PRESCRIPTION

## 2018-04-04 RX ORDER — MINOCYCLINE HYDROCHLORIDE 100 MG/1
CAPSULE ORAL
Qty: 60 | Refills: 1 | Status: ERX | COMMUNITY
Start: 2018-04-04

## 2018-04-04 RX ADMIN — MINOCYCLINE HYDROCHLORIDE: 100 CAPSULE ORAL at 00:00

## 2018-04-04 ASSESSMENT — LOCATION SIMPLE DESCRIPTION DERM
LOCATION SIMPLE: UPPER BACK
LOCATION SIMPLE: RIGHT FOREARM
LOCATION SIMPLE: LEFT UPPER ARM
LOCATION SIMPLE: RIGHT LIP
LOCATION SIMPLE: RIGHT UPPER ARM
LOCATION SIMPLE: RIGHT FOREHEAD
LOCATION SIMPLE: LEFT FOREARM
LOCATION SIMPLE: LEFT CHEEK
LOCATION SIMPLE: POSTERIOR NECK
LOCATION SIMPLE: RIGHT HAND
LOCATION SIMPLE: LEFT ANTERIOR NECK
LOCATION SIMPLE: LEFT HAND

## 2018-04-04 ASSESSMENT — LOCATION DETAILED DESCRIPTION DERM
LOCATION DETAILED: RIGHT PROXIMAL POSTERIOR UPPER ARM
LOCATION DETAILED: RIGHT LOWER CUTANEOUS LIP
LOCATION DETAILED: RIGHT MEDIAL FOREHEAD
LOCATION DETAILED: RIGHT ULNAR DORSAL HAND
LOCATION DETAILED: LEFT DISTAL POSTERIOR UPPER ARM
LOCATION DETAILED: LEFT POSTERIOR NECK
LOCATION DETAILED: LEFT PROXIMAL DORSAL FOREARM
LOCATION DETAILED: LEFT ULNAR DORSAL HAND
LOCATION DETAILED: LEFT CENTRAL MALAR CHEEK
LOCATION DETAILED: LEFT SUPERIOR ANTERIOR NECK
LOCATION DETAILED: INFERIOR THORACIC SPINE
LOCATION DETAILED: RIGHT PROXIMAL DORSAL FOREARM
LOCATION DETAILED: SUPERIOR THORACIC SPINE

## 2018-04-04 ASSESSMENT — LOCATION ZONE DERM
LOCATION ZONE: TRUNK
LOCATION ZONE: ARM
LOCATION ZONE: NECK
LOCATION ZONE: FACE
LOCATION ZONE: LIP
LOCATION ZONE: HAND

## 2018-06-01 ENCOUNTER — OFFICE VISIT (OUTPATIENT)
Dept: MEDICAL GROUP | Facility: MEDICAL CENTER | Age: 73
End: 2018-06-01
Payer: MEDICARE

## 2018-06-01 VITALS
BODY MASS INDEX: 27.09 KG/M2 | TEMPERATURE: 97.8 F | WEIGHT: 200 LBS | HEIGHT: 72 IN | DIASTOLIC BLOOD PRESSURE: 68 MMHG | OXYGEN SATURATION: 93 % | SYSTOLIC BLOOD PRESSURE: 136 MMHG | HEART RATE: 68 BPM | RESPIRATION RATE: 14 BRPM

## 2018-06-01 DIAGNOSIS — R10.32 ABDOMINAL PAIN, LEFT LOWER QUADRANT: ICD-10-CM

## 2018-06-01 PROCEDURE — 99213 OFFICE O/P EST LOW 20 MIN: CPT | Performed by: FAMILY MEDICINE

## 2018-06-01 NOTE — ASSESSMENT & PLAN NOTE
"Was working out in the yard several days ago and had a fall and felt pain and is concerned about a possible hernia in left lower abdomen. Pain has been significantly improving. There is no constipation, diarrhea, dark stools or blood stools. Overall he feels fine.  He also \"pigged out\" 4 days ago and felt pain in lower mid and left lower quadrant, which is what usually happens when he pigs out, but that is also improving now that he is back to his normal diet.  Has used recumbent bike, with some mild soreness in left lower quadrant.  "

## 2018-06-01 NOTE — PROGRESS NOTES
"Subjective:   Vinod Conrad is a 73 y.o. male here today for abdominal pain    Abdominal pain, left lower quadrant  Was working out in the yard several days ago and had a fall and felt pain and is concerned about a possible hernia in left lower abdomen. Pain has been significantly improving. There is no constipation, diarrhea, dark stools or blood stools. Overall he feels fine.  He also \"pigged out\" 4 days ago and felt pain in lower mid and left lower quadrant, which is what usually happens when he pigs out, but that is also improving now that he is back to his normal diet.  Has used recumbent bike, with some mild soreness in left lower quadrant.         Current medicines (including changes today)  Current Outpatient Prescriptions   Medication Sig Dispense Refill   • glucose blood (ONE TOUCH ULTRA TEST) strip 1 Strip by Other route every day. Before meal,E11.9 50 Strip 6   • amlodipine-benazepril (LOTREL) 5-20 MG per capsule Take 1 Cap by mouth every day. 90 Cap 3   • atorvastatin (LIPITOR) 20 MG Tab Take 1 Tab by mouth every day. 90 Tab 3   • triamterene-hctz (MAXZIDE-25/DYAZIDE) 37.5-25 MG Tab Take 1 Tab by mouth every day. 90 Tab 3   • Lancets Misc Check blood sugar once a day before meals and record, E11.9 50 Each 11   • Blood Glucose Monitoring Suppl Device One Touch ultra glucometer, check blood sugar once a day and record before meal, E11.9 1 Device 0     No current facility-administered medications for this visit.      He  has a past medical history of Allergy; Anesthesia (2006); Arthritis; CATARACT; Hyperlipidemia; Hypertension; Infectious disease (2011); Pain; and Unspecified hemorrhagic conditions (2006). He also has no past medical history of ASTHMA.    ROS   No fever, no diarrhea       Objective:     Blood pressure 136/68, pulse 68, temperature 36.6 °C (97.8 °F), resp. rate 14, height 1.829 m (6'), weight 90.7 kg (200 lb), SpO2 93 %. Body mass index is 27.12 kg/m².   Physical " Exam:  Constitutional: Alert, no distress.  Skin: Warm, dry, good turgor, no rashes in visible areas.  Eye: Equal, round and reactive, conjunctiva clear, lids normal.  Abdomen: Soft, mild left lower quadrant tenderness on palpation, no masses, no hepatosplenomegaly. No inguinal hernia appreciated  Psych: Alert and oriented x3, normal affect and mood.        Assessment and Plan:   The following treatment plan was discussed    1. Abdominal pain, left lower quadrant  Possible muscular due to trauma, but if pain does not continue to improve or worsens then consider treatment for diverticulitis given location.      Followup: Return if symptoms worsen or fail to improve.

## 2018-07-09 ENCOUNTER — OFFICE VISIT (OUTPATIENT)
Dept: MEDICAL GROUP | Facility: MEDICAL CENTER | Age: 73
End: 2018-07-09
Payer: MEDICARE

## 2018-07-09 VITALS
TEMPERATURE: 97.9 F | HEART RATE: 73 BPM | WEIGHT: 202.4 LBS | HEIGHT: 72 IN | DIASTOLIC BLOOD PRESSURE: 78 MMHG | OXYGEN SATURATION: 94 % | BODY MASS INDEX: 27.41 KG/M2 | SYSTOLIC BLOOD PRESSURE: 136 MMHG

## 2018-07-09 DIAGNOSIS — N52.9 ERECTILE DYSFUNCTION, UNSPECIFIED ERECTILE DYSFUNCTION TYPE: ICD-10-CM

## 2018-07-09 DIAGNOSIS — M19.90 ARTHRITIS: Chronic | ICD-10-CM

## 2018-07-09 PROBLEM — R10.32 ABDOMINAL PAIN, LEFT LOWER QUADRANT: Status: RESOLVED | Noted: 2018-06-01 | Resolved: 2018-07-09

## 2018-07-09 PROCEDURE — 99214 OFFICE O/P EST MOD 30 MIN: CPT | Performed by: FAMILY MEDICINE

## 2018-07-09 RX ORDER — SILDENAFIL 100 MG/1
100 TABLET, FILM COATED ORAL
Qty: 30 TAB | Refills: 1 | Status: SHIPPED | OUTPATIENT
Start: 2018-07-09 | End: 2018-08-21

## 2018-07-09 RX ORDER — CELECOXIB 200 MG/1
200 CAPSULE ORAL 2 TIMES DAILY PRN
Qty: 60 CAP | Refills: 2 | Status: SHIPPED | OUTPATIENT
Start: 2018-07-09 | End: 2018-08-15

## 2018-07-09 NOTE — PROGRESS NOTES
Subjective:   Vinod Conrad is a 73 y.o. male here today for arthritis    Arthritis  Patient is complaining of diffuse osteoarthritis, he states he has aching in all his joints pretty much.  He complains of aching in his hands, shoulders, legs and back.  He has a history of GI bleeding in 2007 when he was taking multiple aspirins per day for his arthritic pain.  Since then he has avoided NSAIDs.  Currently he is taking 3 tylenol in the morning and 3 tylenol in the afternoon, which helps a little.  When the barometric pressure changes, he states that the pain gets so intense that it wipes him out, he is unable to get out of his house.  Patient is requesting something to help with pain.    Erectile dysfunction  Patient tried Cialis, which worked for erectile dysfunction, but his wife says it made him act goofy. There is still sexually active and is requesting a trial of Viagra.         Current medicines (including changes today)  Current Outpatient Prescriptions   Medication Sig Dispense Refill   • celecoxib (CELEBREX) 200 MG Cap Take 1 Cap by mouth 2 times a day as needed for Moderate Pain. 60 Cap 2   • sildenafil citrate (VIAGRA) 100 MG tablet Take 1 Tab by mouth 1 time daily as needed for Erectile Dysfunction. 30 Tab 1   • glucose blood (ONE TOUCH ULTRA TEST) strip 1 Strip by Other route every day. Before meal,E11.9 50 Strip 6   • amlodipine-benazepril (LOTREL) 5-20 MG per capsule Take 1 Cap by mouth every day. 90 Cap 3   • atorvastatin (LIPITOR) 20 MG Tab Take 1 Tab by mouth every day. 90 Tab 3   • triamterene-hctz (MAXZIDE-25/DYAZIDE) 37.5-25 MG Tab Take 1 Tab by mouth every day. 90 Tab 3   • Lancets Misc Check blood sugar once a day before meals and record, E11.9 50 Each 11   • Blood Glucose Monitoring Suppl Device One Touch ultra glucometer, check blood sugar once a day and record before meal, E11.9 1 Device 0     No current facility-administered medications for this visit.      He  has a past medical  history of Allergy; Anesthesia (2006); Arthritis; CATARACT; Hyperlipidemia; Hypertension; Infectious disease (2011); Pain; and Unspecified hemorrhagic conditions (2006). He also has no past medical history of ASTHMA.    ROS   No chest pain, no GI pain       Objective:     Blood pressure 136/78, pulse 73, temperature 36.6 °C (97.9 °F), height 1.829 m (6'), weight 91.8 kg (202 lb 6.4 oz), SpO2 94 %. Body mass index is 27.45 kg/m².   Physical Exam:  Constitutional: Alert, no distress.  Skin: Warm, dry, good turgor, no rashes in visible areas.  Eye: Equal, round and reactive, conjunctiva clear, lids normal.  Psych: Alert and oriented x3, normal affect and mood.        Assessment and Plan:   The following treatment plan was discussed    1. Arthritis  Uncontrolled.  Continue Tylenol and we will add Celebrex, avoiding other NSAIDs because of history with GI bleed.  Advised patient to use Celebrex only as needed.  - celecoxib (CELEBREX) 200 MG Cap; Take 1 Cap by mouth 2 times a day as needed for Moderate Pain.  Dispense: 60 Cap; Refill: 2    2. Erectile dysfunction, unspecified erectile dysfunction type  Prescription for Viagra, advised patient to cut tablets in quarters or halves initially.  - sildenafil citrate (VIAGRA) 100 MG tablet; Take 1 Tab by mouth 1 time daily as needed for Erectile Dysfunction.  Dispense: 30 Tab; Refill: 1      Followup: Return if symptoms worsen or fail to improve.

## 2018-07-09 NOTE — ASSESSMENT & PLAN NOTE
Patient tried Cialis, which worked for erectile dysfunction, but his wife says it made him act goofy. There is still sexually active and is requesting a trial of Viagra.

## 2018-07-09 NOTE — ASSESSMENT & PLAN NOTE
Patient is complaining of diffuse osteoarthritis, he states he has aching in all his joints pretty much.  He complains of aching in his hands, shoulders, legs and back.  He has a history of GI bleeding in 2007 when he was taking multiple aspirins per day for his arthritic pain.  Since then he has avoided NSAIDs.  Currently he is taking 3 tylenol in the morning and 3 tylenol in the afternoon, which helps a little.  When the barometric pressure changes, he states that the pain gets so intense that it wipes him out, he is unable to get out of his house.  Patient is requesting something to help with pain.

## 2018-08-15 ENCOUNTER — HOSPITAL ENCOUNTER (EMERGENCY)
Facility: MEDICAL CENTER | Age: 73
End: 2018-08-15
Attending: EMERGENCY MEDICINE
Payer: MEDICARE

## 2018-08-15 ENCOUNTER — HOSPITAL ENCOUNTER (INPATIENT)
Facility: MEDICAL CENTER | Age: 73
LOS: 2 days | DRG: 246 | End: 2018-08-17
Attending: EMERGENCY MEDICINE | Admitting: HOSPITALIST
Payer: MEDICARE

## 2018-08-15 ENCOUNTER — APPOINTMENT (OUTPATIENT)
Dept: RADIOLOGY | Facility: MEDICAL CENTER | Age: 73
End: 2018-08-15
Attending: EMERGENCY MEDICINE
Payer: MEDICARE

## 2018-08-15 VITALS
WEIGHT: 201.5 LBS | OXYGEN SATURATION: 95 % | HEART RATE: 86 BPM | RESPIRATION RATE: 19 BRPM | DIASTOLIC BLOOD PRESSURE: 92 MMHG | HEIGHT: 73 IN | BODY MASS INDEX: 26.71 KG/M2 | SYSTOLIC BLOOD PRESSURE: 158 MMHG

## 2018-08-15 DIAGNOSIS — I21.3 ST ELEVATION MYOCARDIAL INFARCTION (STEMI), UNSPECIFIED ARTERY (HCC): ICD-10-CM

## 2018-08-15 DIAGNOSIS — I46.9 CARDIAC ARREST (HCC): ICD-10-CM

## 2018-08-15 DIAGNOSIS — I49.01 VENTRICULAR FIBRILLATION (HCC): ICD-10-CM

## 2018-08-15 PROBLEM — R57.0 CARDIOGENIC SHOCK (HCC): Status: ACTIVE | Noted: 2018-08-15

## 2018-08-15 PROBLEM — E87.6 HYPOKALEMIA: Status: ACTIVE | Noted: 2018-08-15

## 2018-08-15 LAB
ALBUMIN SERPL BCP-MCNC: 4.3 G/DL (ref 3.2–4.9)
ALBUMIN/GLOB SERPL: 1.4 G/DL
ALP SERPL-CCNC: 61 U/L (ref 30–99)
ALT SERPL-CCNC: 30 U/L (ref 2–50)
ANION GAP SERPL CALC-SCNC: 11 MMOL/L (ref 0–11.9)
APTT PPP: 29.9 SEC (ref 24.7–36)
AST SERPL-CCNC: 31 U/L (ref 12–45)
BASOPHILS # BLD AUTO: 0.5 % (ref 0–1.8)
BASOPHILS # BLD: 0.05 K/UL (ref 0–0.12)
BILIRUB SERPL-MCNC: 0.9 MG/DL (ref 0.1–1.5)
BNP SERPL-MCNC: 68 PG/ML (ref 0–100)
BUN SERPL-MCNC: 17 MG/DL (ref 8–22)
CALCIUM SERPL-MCNC: 10.4 MG/DL (ref 8.4–10.2)
CHLORIDE SERPL-SCNC: 101 MMOL/L (ref 96–112)
CO2 SERPL-SCNC: 25 MMOL/L (ref 20–33)
CREAT SERPL-MCNC: 0.9 MG/DL (ref 0.5–1.4)
EKG IMPRESSION: NORMAL
EKG IMPRESSION: NORMAL
EOSINOPHIL # BLD AUTO: 0.16 K/UL (ref 0–0.51)
EOSINOPHIL NFR BLD: 1.5 % (ref 0–6.9)
ERYTHROCYTE [DISTWIDTH] IN BLOOD BY AUTOMATED COUNT: 42.1 FL (ref 35.9–50)
GLOBULIN SER CALC-MCNC: 3 G/DL (ref 1.9–3.5)
GLUCOSE SERPL-MCNC: 162 MG/DL (ref 65–99)
HCT VFR BLD AUTO: 45.2 % (ref 42–52)
HGB BLD-MCNC: 15.4 G/DL (ref 14–18)
IMM GRANULOCYTES # BLD AUTO: 0.03 K/UL (ref 0–0.11)
IMM GRANULOCYTES NFR BLD AUTO: 0.3 % (ref 0–0.9)
INR PPP: 0.99 (ref 0.87–1.13)
LIPASE SERPL-CCNC: 31 U/L (ref 7–58)
LYMPHOCYTES # BLD AUTO: 3.45 K/UL (ref 1–4.8)
LYMPHOCYTES NFR BLD: 31.9 % (ref 22–41)
MCH RBC QN AUTO: 30.8 PG (ref 27–33)
MCHC RBC AUTO-ENTMCNC: 34.1 G/DL (ref 33.7–35.3)
MCV RBC AUTO: 90.4 FL (ref 81.4–97.8)
MONOCYTES # BLD AUTO: 0.95 K/UL (ref 0–0.85)
MONOCYTES NFR BLD AUTO: 8.8 % (ref 0–13.4)
NEUTROPHILS # BLD AUTO: 6.17 K/UL (ref 1.82–7.42)
NEUTROPHILS NFR BLD: 57 % (ref 44–72)
NRBC # BLD AUTO: 0 K/UL
NRBC BLD-RTO: 0 /100 WBC
PLATELET # BLD AUTO: 232 K/UL (ref 164–446)
PMV BLD AUTO: 9.5 FL (ref 9–12.9)
POTASSIUM SERPL-SCNC: 3.5 MMOL/L (ref 3.6–5.5)
PROT SERPL-MCNC: 7.3 G/DL (ref 6–8.2)
PROTHROMBIN TIME: 13 SEC (ref 12–14.6)
RBC # BLD AUTO: 5 M/UL (ref 4.7–6.1)
SODIUM SERPL-SCNC: 137 MMOL/L (ref 135–145)
TROPONIN I SERPL-MCNC: 0.14 NG/ML (ref 0–0.04)
WBC # BLD AUTO: 10.8 K/UL (ref 4.8–10.8)

## 2018-08-15 PROCEDURE — 700105 HCHG RX REV CODE 258: Performed by: INTERNAL MEDICINE

## 2018-08-15 PROCEDURE — 96375 TX/PRO/DX INJ NEW DRUG ADDON: CPT

## 2018-08-15 PROCEDURE — 770022 HCHG ROOM/CARE - ICU (200)

## 2018-08-15 PROCEDURE — 99153 MOD SED SAME PHYS/QHP EA: CPT

## 2018-08-15 PROCEDURE — 3E033PZ INTRODUCTION OF PLATELET INHIBITOR INTO PERIPHERAL VEIN, PERCUTANEOUS APPROACH: ICD-10-PCS | Performed by: INTERNAL MEDICINE

## 2018-08-15 PROCEDURE — B2151ZZ FLUOROSCOPY OF LEFT HEART USING LOW OSMOLAR CONTRAST: ICD-10-PCS | Performed by: INTERNAL MEDICINE

## 2018-08-15 PROCEDURE — C1894 INTRO/SHEATH, NON-LASER: HCPCS

## 2018-08-15 PROCEDURE — 99223 1ST HOSP IP/OBS HIGH 75: CPT | Mod: AI | Performed by: HOSPITALIST

## 2018-08-15 PROCEDURE — 700111 HCHG RX REV CODE 636 W/ 250 OVERRIDE (IP)

## 2018-08-15 PROCEDURE — C1874 STENT, COATED/COV W/DEL SYS: HCPCS

## 2018-08-15 PROCEDURE — C1725 CATH, TRANSLUMIN NON-LASER: HCPCS

## 2018-08-15 PROCEDURE — 36415 COLL VENOUS BLD VENIPUNCTURE: CPT

## 2018-08-15 PROCEDURE — 4A023N7 MEASUREMENT OF CARDIAC SAMPLING AND PRESSURE, LEFT HEART, PERCUTANEOUS APPROACH: ICD-10-PCS | Performed by: INTERNAL MEDICINE

## 2018-08-15 PROCEDURE — 96372 THER/PROPH/DIAG INJ SC/IM: CPT | Mod: XU

## 2018-08-15 PROCEDURE — C9606 PERC D-E COR REVASC W AMI S: HCPCS | Mod: LD

## 2018-08-15 PROCEDURE — 307093 HCHG TR BAND RADIAL

## 2018-08-15 PROCEDURE — 93010 ELECTROCARDIOGRAM REPORT: CPT | Performed by: INTERNAL MEDICINE

## 2018-08-15 PROCEDURE — A9270 NON-COVERED ITEM OR SERVICE: HCPCS | Performed by: HOSPITALIST

## 2018-08-15 PROCEDURE — 80053 COMPREHEN METABOLIC PANEL: CPT

## 2018-08-15 PROCEDURE — 700105 HCHG RX REV CODE 258: Performed by: EMERGENCY MEDICINE

## 2018-08-15 PROCEDURE — 304952 HCHG R 2 PADS

## 2018-08-15 PROCEDURE — 700111 HCHG RX REV CODE 636 W/ 250 OVERRIDE (IP): Performed by: EMERGENCY MEDICINE

## 2018-08-15 PROCEDURE — 84484 ASSAY OF TROPONIN QUANT: CPT

## 2018-08-15 PROCEDURE — C1887 CATHETER, GUIDING: HCPCS

## 2018-08-15 PROCEDURE — 700102 HCHG RX REV CODE 250 W/ 637 OVERRIDE(OP)

## 2018-08-15 PROCEDURE — C1769 GUIDE WIRE: HCPCS

## 2018-08-15 PROCEDURE — 304561 HCHG STAT O2

## 2018-08-15 PROCEDURE — 99152 MOD SED SAME PHYS/QHP 5/>YRS: CPT

## 2018-08-15 PROCEDURE — 85025 COMPLETE CBC W/AUTO DIFF WBC: CPT

## 2018-08-15 PROCEDURE — 96374 THER/PROPH/DIAG INJ IV PUSH: CPT

## 2018-08-15 PROCEDURE — 93005 ELECTROCARDIOGRAM TRACING: CPT | Performed by: EMERGENCY MEDICINE

## 2018-08-15 PROCEDURE — 700102 HCHG RX REV CODE 250 W/ 637 OVERRIDE(OP): Performed by: HOSPITALIST

## 2018-08-15 PROCEDURE — 93458 L HRT ARTERY/VENTRICLE ANGIO: CPT

## 2018-08-15 PROCEDURE — 027035Z DILATION OF CORONARY ARTERY, ONE ARTERY WITH TWO DRUG-ELUTING INTRALUMINAL DEVICES, PERCUTANEOUS APPROACH: ICD-10-PCS | Performed by: INTERNAL MEDICINE

## 2018-08-15 PROCEDURE — 99285 EMERGENCY DEPT VISIT HI MDM: CPT

## 2018-08-15 PROCEDURE — 360979 HCHG DIAGNOSTIC CATH

## 2018-08-15 PROCEDURE — B2111ZZ FLUOROSCOPY OF MULTIPLE CORONARY ARTERIES USING LOW OSMOLAR CONTRAST: ICD-10-PCS | Performed by: INTERNAL MEDICINE

## 2018-08-15 PROCEDURE — 71045 X-RAY EXAM CHEST 1 VIEW: CPT

## 2018-08-15 PROCEDURE — 83690 ASSAY OF LIPASE: CPT

## 2018-08-15 PROCEDURE — 83880 ASSAY OF NATRIURETIC PEPTIDE: CPT

## 2018-08-15 PROCEDURE — 700101 HCHG RX REV CODE 250

## 2018-08-15 PROCEDURE — 700102 HCHG RX REV CODE 250 W/ 637 OVERRIDE(OP): Performed by: INTERNAL MEDICINE

## 2018-08-15 PROCEDURE — 93005 ELECTROCARDIOGRAM TRACING: CPT | Performed by: INTERNAL MEDICINE

## 2018-08-15 PROCEDURE — A9270 NON-COVERED ITEM OR SERVICE: HCPCS | Performed by: INTERNAL MEDICINE

## 2018-08-15 PROCEDURE — A9270 NON-COVERED ITEM OR SERVICE: HCPCS

## 2018-08-15 PROCEDURE — 85610 PROTHROMBIN TIME: CPT

## 2018-08-15 PROCEDURE — 700111 HCHG RX REV CODE 636 W/ 250 OVERRIDE (IP): Performed by: INTERNAL MEDICINE

## 2018-08-15 PROCEDURE — 85730 THROMBOPLASTIN TIME PARTIAL: CPT

## 2018-08-15 PROCEDURE — 94760 N-INVAS EAR/PLS OXIMETRY 1: CPT

## 2018-08-15 RX ORDER — AMOXICILLIN 250 MG
2 CAPSULE ORAL 2 TIMES DAILY
Status: DISCONTINUED | OUTPATIENT
Start: 2018-08-15 | End: 2018-08-17

## 2018-08-15 RX ORDER — ONDANSETRON 2 MG/ML
INJECTION INTRAMUSCULAR; INTRAVENOUS
Status: COMPLETED | OUTPATIENT
Start: 2018-08-15 | End: 2018-08-15

## 2018-08-15 RX ORDER — ATORVASTATIN CALCIUM 20 MG/1
20 TABLET, FILM COATED ORAL
Status: DISCONTINUED | OUTPATIENT
Start: 2018-08-15 | End: 2018-08-15

## 2018-08-15 RX ORDER — MAGNESIUM SULFATE HEPTAHYDRATE 500 MG/ML
2 INJECTION, SOLUTION INTRAMUSCULAR; INTRAVENOUS ONCE
Status: DISCONTINUED | OUTPATIENT
Start: 2018-08-15 | End: 2018-08-15 | Stop reason: HOSPADM

## 2018-08-15 RX ORDER — ASPIRIN 81 MG/1
TABLET, CHEWABLE ORAL
Status: COMPLETED
Start: 2018-08-15 | End: 2018-08-15

## 2018-08-15 RX ORDER — ASPIRIN 81 MG/1
324 TABLET, CHEWABLE ORAL ONCE
Status: DISCONTINUED | OUTPATIENT
Start: 2018-08-15 | End: 2018-08-15 | Stop reason: HOSPADM

## 2018-08-15 RX ORDER — BIVALIRUDIN 250 MG/5ML
INJECTION, POWDER, LYOPHILIZED, FOR SOLUTION INTRAVENOUS
Status: COMPLETED
Start: 2018-08-15 | End: 2018-08-15

## 2018-08-15 RX ORDER — MAGNESIUM SULFATE HEPTAHYDRATE 40 MG/ML
INJECTION, SOLUTION INTRAVENOUS
Status: COMPLETED | OUTPATIENT
Start: 2018-08-15 | End: 2018-08-15

## 2018-08-15 RX ORDER — ACETAMINOPHEN 325 MG/1
650 TABLET ORAL EVERY 6 HOURS PRN
Status: DISCONTINUED | OUTPATIENT
Start: 2018-08-15 | End: 2018-08-17 | Stop reason: HOSPADM

## 2018-08-15 RX ORDER — MIDAZOLAM HYDROCHLORIDE 1 MG/ML
INJECTION INTRAMUSCULAR; INTRAVENOUS
Status: COMPLETED
Start: 2018-08-15 | End: 2018-08-15

## 2018-08-15 RX ORDER — ASPIRIN 81 MG/1
324 TABLET, CHEWABLE ORAL ONCE
Status: COMPLETED | OUTPATIENT
Start: 2018-08-15 | End: 2018-08-15

## 2018-08-15 RX ORDER — AMIODARONE HYDROCHLORIDE 150 MG/3ML
INJECTION, SOLUTION INTRAVENOUS
Status: COMPLETED | OUTPATIENT
Start: 2018-08-15 | End: 2018-08-15

## 2018-08-15 RX ORDER — POLYETHYLENE GLYCOL 3350 17 G/17G
1 POWDER, FOR SOLUTION ORAL
Status: DISCONTINUED | OUTPATIENT
Start: 2018-08-15 | End: 2018-08-17

## 2018-08-15 RX ORDER — LIDOCAINE HYDROCHLORIDE 20 MG/ML
INJECTION, SOLUTION INFILTRATION; PERINEURAL
Status: COMPLETED
Start: 2018-08-15 | End: 2018-08-15

## 2018-08-15 RX ORDER — VERAPAMIL HYDROCHLORIDE 2.5 MG/ML
INJECTION, SOLUTION INTRAVENOUS
Status: COMPLETED
Start: 2018-08-15 | End: 2018-08-15

## 2018-08-15 RX ORDER — ASPIRIN 325 MG
325 TABLET ORAL DAILY
Status: DISCONTINUED | OUTPATIENT
Start: 2018-08-16 | End: 2018-08-16

## 2018-08-15 RX ORDER — PRASUGREL 10 MG/1
60 TABLET, FILM COATED ORAL ONCE
Status: COMPLETED | OUTPATIENT
Start: 2018-08-15 | End: 2018-08-15

## 2018-08-15 RX ORDER — PRASUGREL 10 MG/1
TABLET, FILM COATED ORAL
Status: DISPENSED
Start: 2018-08-15 | End: 2018-08-16

## 2018-08-15 RX ORDER — POTASSIUM CHLORIDE 7.45 MG/ML
INJECTION INTRAVENOUS
Status: COMPLETED
Start: 2018-08-15 | End: 2018-08-15

## 2018-08-15 RX ORDER — BISACODYL 10 MG
10 SUPPOSITORY, RECTAL RECTAL
Status: DISCONTINUED | OUTPATIENT
Start: 2018-08-15 | End: 2018-08-17

## 2018-08-15 RX ORDER — SODIUM CHLORIDE 9 MG/ML
INJECTION, SOLUTION INTRAVENOUS CONTINUOUS
Status: DISCONTINUED | OUTPATIENT
Start: 2018-08-15 | End: 2018-08-16

## 2018-08-15 RX ORDER — DEXTROSE MONOHYDRATE 50 MG/ML
INJECTION, SOLUTION INTRAVENOUS CONTINUOUS
Status: DISCONTINUED | OUTPATIENT
Start: 2018-08-15 | End: 2018-08-15 | Stop reason: HOSPADM

## 2018-08-15 RX ORDER — ATORVASTATIN CALCIUM 80 MG/1
80 TABLET, FILM COATED ORAL
Status: DISCONTINUED | OUTPATIENT
Start: 2018-08-15 | End: 2018-08-17 | Stop reason: HOSPADM

## 2018-08-15 RX ORDER — ONDANSETRON 2 MG/ML
8 INJECTION INTRAMUSCULAR; INTRAVENOUS ONCE
Status: COMPLETED | OUTPATIENT
Start: 2018-08-15 | End: 2018-08-15

## 2018-08-15 RX ORDER — ONDANSETRON 2 MG/ML
INJECTION INTRAMUSCULAR; INTRAVENOUS
Status: COMPLETED
Start: 2018-08-15 | End: 2018-08-15

## 2018-08-15 RX ORDER — PRASUGREL 10 MG/1
10 TABLET, FILM COATED ORAL DAILY
Status: DISCONTINUED | OUTPATIENT
Start: 2018-08-16 | End: 2018-08-17 | Stop reason: HOSPADM

## 2018-08-15 RX ORDER — HEPARIN SODIUM,PORCINE 1000/ML
VIAL (ML) INJECTION
Status: COMPLETED
Start: 2018-08-15 | End: 2018-08-15

## 2018-08-15 RX ADMIN — EPINEPHRINE 1 MG: 0.1 INJECTION, SOLUTION ENDOTRACHEAL; INTRACARDIAC; INTRAVENOUS at 11:32

## 2018-08-15 RX ADMIN — AMIODARONE HYDROCHLORIDE 150 MG: 50 INJECTION, SOLUTION INTRAVENOUS at 11:36

## 2018-08-15 RX ADMIN — ASPIRIN 81 MG 324 MG: 81 TABLET ORAL at 11:23

## 2018-08-15 RX ADMIN — EPTIFIBATIDE 20000 MCG: 2 INJECTION, SOLUTION INTRAVENOUS at 12:47

## 2018-08-15 RX ADMIN — NITROGLYCERIN 10 ML: 20 INJECTION INTRAVENOUS at 12:47

## 2018-08-15 RX ADMIN — ATORVASTATIN CALCIUM 80 MG: 80 TABLET, FILM COATED ORAL at 20:04

## 2018-08-15 RX ADMIN — PHENYLEPHRINE HYDROCHLORIDE 20000 MCG: 10 INJECTION INTRAVENOUS at 12:55

## 2018-08-15 RX ADMIN — ONDANSETRON HYDROCHLORIDE 4 MG: 2 INJECTION, SOLUTION INTRAMUSCULAR; INTRAVENOUS at 11:38

## 2018-08-15 RX ADMIN — SODIUM CHLORIDE: 9 INJECTION, SOLUTION INTRAVENOUS at 14:00

## 2018-08-15 RX ADMIN — FENTANYL CITRATE 50 MCG: 50 INJECTION, SOLUTION INTRAMUSCULAR; INTRAVENOUS at 13:18

## 2018-08-15 RX ADMIN — AMIODARONE HYDROCHLORIDE 150 MG: 50 INJECTION, SOLUTION INTRAVENOUS at 12:30

## 2018-08-15 RX ADMIN — SODIUM CHLORIDE 1000 ML: 9 INJECTION, SOLUTION INTRAVENOUS at 19:57

## 2018-08-15 RX ADMIN — VERAPAMIL HYDROCHLORIDE 5 MG: 2.5 INJECTION, SOLUTION INTRAVENOUS at 12:46

## 2018-08-15 RX ADMIN — ASPIRIN 324 MG: 81 TABLET, CHEWABLE ORAL at 11:23

## 2018-08-15 RX ADMIN — PRASUGREL 60 MG: 10 TABLET, FILM COATED ORAL at 13:28

## 2018-08-15 RX ADMIN — FENTANYL CITRATE 100 MCG: 50 INJECTION, SOLUTION INTRAMUSCULAR; INTRAVENOUS at 12:46

## 2018-08-15 RX ADMIN — POTASSIUM BICARBONATE 50 MEQ: 25 TABLET, EFFERVESCENT ORAL at 17:47

## 2018-08-15 RX ADMIN — MAGNESIUM SULFATE IN WATER 2 G: 40 INJECTION, SOLUTION INTRAVENOUS at 11:41

## 2018-08-15 RX ADMIN — HEPARIN SODIUM 2000 UNITS: 200 INJECTION, SOLUTION INTRAVENOUS at 12:47

## 2018-08-15 RX ADMIN — MIDAZOLAM HYDROCHLORIDE 2 MG: 1 INJECTION, SOLUTION INTRAMUSCULAR; INTRAVENOUS at 12:45

## 2018-08-15 RX ADMIN — ONDANSETRON 8 MG: 2 INJECTION INTRAMUSCULAR; INTRAVENOUS at 12:05

## 2018-08-15 RX ADMIN — ONDANSETRON 4 MG: 2 INJECTION INTRAMUSCULAR; INTRAVENOUS at 11:36

## 2018-08-15 RX ADMIN — PHENYLEPHRINE HYDROCHLORIDE 100 MCG/MIN: 10 INJECTION INTRAVENOUS at 14:23

## 2018-08-15 RX ADMIN — HEPARIN SODIUM: 1000 INJECTION, SOLUTION INTRAVENOUS; SUBCUTANEOUS at 12:45

## 2018-08-15 RX ADMIN — BIVALIRUDIN: 250 INJECTION, POWDER, LYOPHILIZED, FOR SOLUTION INTRAVENOUS at 12:30

## 2018-08-15 RX ADMIN — LIDOCAINE HYDROCHLORIDE: 20 INJECTION, SOLUTION INFILTRATION; PERINEURAL at 12:45

## 2018-08-15 RX ADMIN — EPINEPHRINE 1 MG: 0.1 INJECTION, SOLUTION ENDOTRACHEAL; INTRACARDIAC; INTRAVENOUS at 11:34

## 2018-08-15 RX ADMIN — PHENYLEPHRINE HYDROCHLORIDE 20000 MCG: 10 INJECTION INTRAVENOUS at 12:56

## 2018-08-15 RX ADMIN — POTASSIUM CHLORIDE 10 MEQ: 7.46 INJECTION, SOLUTION INTRAVENOUS at 12:30

## 2018-08-15 RX ADMIN — BIVALIRUDIN 0.2 MG/KG/HR: 250 INJECTION, POWDER, LYOPHILIZED, FOR SOLUTION INTRAVENOUS at 15:42

## 2018-08-15 ASSESSMENT — PATIENT HEALTH QUESTIONNAIRE - PHQ9
1. LITTLE INTEREST OR PLEASURE IN DOING THINGS: NOT AT ALL
SUM OF ALL RESPONSES TO PHQ9 QUESTIONS 1 AND 2: 0
1. LITTLE INTEREST OR PLEASURE IN DOING THINGS: NOT AT ALL
2. FEELING DOWN, DEPRESSED, IRRITABLE, OR HOPELESS: NOT AT ALL
SUM OF ALL RESPONSES TO PHQ9 QUESTIONS 1 AND 2: 0
SUM OF ALL RESPONSES TO PHQ9 QUESTIONS 1 AND 2: 0
2. FEELING DOWN, DEPRESSED, IRRITABLE, OR HOPELESS: NOT AT ALL
1. LITTLE INTEREST OR PLEASURE IN DOING THINGS: NOT AT ALL
2. FEELING DOWN, DEPRESSED, IRRITABLE, OR HOPELESS: NOT AT ALL

## 2018-08-15 ASSESSMENT — COGNITIVE AND FUNCTIONAL STATUS - GENERAL
DAILY ACTIVITIY SCORE: 24
SUGGESTED CMS G CODE MODIFIER DAILY ACTIVITY: CH
SUGGESTED CMS G CODE MODIFIER MOBILITY: CH
MOBILITY SCORE: 24

## 2018-08-15 ASSESSMENT — COPD QUESTIONNAIRES
HAVE YOU SMOKED AT LEAST 100 CIGARETTES IN YOUR ENTIRE LIFE: NO/DON'T KNOW
DO YOU EVER COUGH UP ANY MUCUS OR PHLEGM?: NO/ONLY WITH OCCASIONAL COLDS OR INFECTIONS
DURING THE PAST 4 WEEKS HOW MUCH DID YOU FEEL SHORT OF BREATH: NONE/LITTLE OF THE TIME
COPD SCREENING SCORE: 2
IN THE PAST 12 MONTHS DO YOU DO LESS THAN YOU USED TO BECAUSE OF YOUR BREATHING PROBLEMS: DISAGREE/UNSURE

## 2018-08-15 ASSESSMENT — LIFESTYLE VARIABLES
HAVE PEOPLE ANNOYED YOU BY CRITICIZING YOUR DRINKING: NO
EVER_SMOKED: NEVER
EVER FELT BAD OR GUILTY ABOUT YOUR DRINKING: NO
EVER HAD A DRINK FIRST THING IN THE MORNING TO STEADY YOUR NERVES TO GET RID OF A HANGOVER: NO
AVERAGE NUMBER OF DAYS PER WEEK YOU HAVE A DRINK CONTAINING ALCOHOL: 5
TOTAL SCORE: 0
ALCOHOL_USE: YES
EVER_SMOKED: NEVER
ON A TYPICAL DAY WHEN YOU DRINK ALCOHOL HOW MANY DRINKS DO YOU HAVE: 3
TOTAL SCORE: 0
TOTAL SCORE: 0
HOW MANY TIMES IN THE PAST YEAR HAVE YOU HAD 5 OR MORE DRINKS IN A DAY: 2
CONSUMPTION TOTAL: POSITIVE
HAVE YOU EVER FELT YOU SHOULD CUT DOWN ON YOUR DRINKING: NO

## 2018-08-15 ASSESSMENT — PAIN SCALES - GENERAL
PAINLEVEL_OUTOF10: 2
PAINLEVEL_OUTOF10: 2
PAINLEVEL_OUTOF10: 1
PAINLEVEL_OUTOF10: 5

## 2018-08-15 ASSESSMENT — ENCOUNTER SYMPTOMS
CHILLS: 0
LOSS OF CONSCIOUSNESS: 0
FEVER: 0

## 2018-08-15 NOTE — DISCHARGE PLANNING
STEMI Response    Referral: Code STEMI Response    Intervention: MSW responded to STEMI.  Pt was BIB [facility] after transfer from [facility].  Pt was alert upon arrival.  Pts name is Vinod Conrad (: 1945).  SW obtained the following pt information: MSW met with pt's wife Ada  and daughter Vivien ([phone]).  No other needs at this time. Dr. Seymourhr in to speak with family.     Plan: Remain available for support

## 2018-08-15 NOTE — RESPIRATORY CARE
Cardiopulmonary Resuscitation/ Assist    Responded to ER 6 for overhead code blue page.  Patient receiving CPR when I arrived, was shocked and I took over bagging x 1 minute.  Patient now awake and back on nasal cannula 8 LPM with O2 saturation 92-97% (initially difficult to get saturation reading). No intubation required.  RR 18-22.  Patient did have a couple of episodes of emesis and was able to vomit into emesis bag and some oral suctioning performed.  Did not appear to have aspirated.  Breath sounds clear, diminished bases.  Patient pale. 1145 patient alert and oriented.  [facility] here at approx 1155 to transport patient to [facility].

## 2018-08-15 NOTE — ED NOTES
Vfib on monitor code blue called and CPR started  Dr Juan at bedside with nurses.  1132 200 shock and epi 1 amp given IV  Patient still in Vfib  CPR continues and second 200 shock given at 1134  Patient now awake and has heart rhythm junctional with BBB  Fluids started and amiodarone bolus given at 1136 150mg (pharmacy present)  Patient started vomiting (RT suctioning) zofran given 4mg IV at 1137   Patient remains with sinus rhythm BBB around 100 - 140  2 gms magnesium given IV  1149  Phenylephrine given  Amiodarone drip started after bolus at 1mg/min on pump.

## 2018-08-15 NOTE — DISCHARGE PLANNING
Code blue was heard overhead.  ALPESH met with pt's spouse, Ada.  SW provided emotional support.  Ada's friend arrived to the hospital.  ALPESH was informed by TASHI Dyson that the pt will transfer to [facility].

## 2018-08-15 NOTE — ASSESSMENT & PLAN NOTE
Discussed lifestyle and dietary changes  High-intensity statin  8/16 Total cholesterol 126 and LDL 51

## 2018-08-15 NOTE — H&P
Hospital Medicine History & Physical Note    Date of Service  8/15/2018    Primary Care Physician  Jamal Nieves M.D.    Consultants  Cardiology     Code Status  Full     Chief Complaint  Chest pain     History of Presenting Illness  73 y.o. male who presented 8/15/2018 with chest pain. Mr. Conrad has a history of hypertension and arthritis that noted for the past week when he rides his recumbent exercise bike is been getting bilateral shoulder and arm pain that seems to improve when he stops the bike.  This morning about 1:30 in the morning his shoulders and arms hurt so severely he paced around for a couple hours at home that went back to bed.  About 6:00 this morning he got up walk to the mailbox after breakfast and started developing substernal chest pain and sweating therefore his wife made him come to the emergency room at [facility] where an EKG revealed ST elevation therefore paramedics were called for him to transfer to [facility] where he developed V. fib arrest and was defibrillated given epinephrine and then converted to torsades for which she was given 2 g of magnesium.  He has now gone to the Cath Lab and had 2 stents to the LAD has been admitted to the cardiac intensive care unit.    Review of Systems  Review of Systems   Constitutional: Negative for chills and fever.   Cardiovascular: Positive for chest pain.   Musculoskeletal:        Bilateral shoulder pains   Neurological: Negative for loss of consciousness.   All other systems reviewed and are negative.      Past Medical History   has a past medical history of Allergy; Anesthesia (2006); Arthritis; CATARACT; Hyperlipidemia; Hypertension; Infectious disease (2011); Pain  No history of MI nor CVA    Surgical History   has a past surgical history that includes scleral buckling (1/13/2009); other orthopedic surgery (2005); other (2010); and cervical foraminotomy (5/20/2013).     Family History  His brother had 3 cardiac stents last week     Social  History   reports that he quit smoking about 20 years ago. His smoking use included Cigarettes. He has a 60.00 pack-year smoking history. He has never used smokeless tobacco. He reports that he drinks alcohol. He reports that he does not use drugs. he lives with his wife.    Allergies  Allergies   Allergen Reactions   • Aspirin Anaphylaxis     INTERNAL BLEEDING   • Augmentin Vomiting   • Clindamycin      Develop c.diff   • Metronidazole      Not sure   • Nsaids Unspecified     GI bleed   • Vancomycin Swelling       Medications  Prior to Admission Medications   Prescriptions Last Dose Informant Patient Reported? Taking?   Probiotic Product (PROBIOTIC DAILY PO) 8/15/2018 at 0700 Patient Yes No   Sig: Take 1 Cap by mouth every day.   amlodipine-benazepril (LOTREL) 5-20 MG per capsule 8/15/2018 at 0700 Patient No No   Sig: Take 1 Cap by mouth every day.   atorvastatin (LIPITOR) 20 MG Tab 8/15/2018 at 0700 Patient No No   Sig: Take 1 Tab by mouth every day.   sildenafil citrate (VIAGRA) 100 MG tablet 8/13/2018 at Unknown Patient No No   Sig: Take 1 Tab by mouth 1 time daily as needed for Erectile Dysfunction.   triamterene-hctz (MAXZIDE-25/DYAZIDE) 37.5-25 MG Tab 8/15/2018 at 0700 Patient No No   Sig: Take 1 Tab by mouth every day.      Facility-Administered Medications: None       Physical Exam  Blood Pressure : 120/70   Temperature: 36.7 °C (98 °F)   Pulse: 65   Respiration: 18   Pulse Oximetry: 93 %     Physical Exam   Constitutional: He is oriented to person, place, and time. No distress.   HENT:   Head: Normocephalic and atraumatic.   Neck:   No bruits    Cardiovascular: Normal rate and regular rhythm.    No murmur heard.  Pulmonary/Chest: Effort normal and breath sounds normal. No respiratory distress.   Abdominal: Soft. He exhibits no distension. There is no tenderness.   Musculoskeletal: He exhibits no edema or tenderness.   Neurological: He is alert and oriented to person, place, and time.   Skin: Skin is warm  and dry. He is not diaphoretic.   Psychiatric: He has a normal mood and affect. His behavior is normal.   Nursing note and vitals reviewed.      Laboratory:  Recent Labs      08/15/18   1121   WBC  10.8   RBC  5.00   HEMOGLOBIN  15.4   HEMATOCRIT  45.2   MCV  90.4   MCH  30.8   MCHC  34.1   RDW  42.1   PLATELETCT  232   MPV  9.5     Recent Labs      08/15/18   1121   SODIUM  137   POTASSIUM  3.5*   CHLORIDE  101   CO2  25   GLUCOSE  162*   BUN  17   CREATININE  0.90   CALCIUM  10.4*     Recent Labs      08/15/18   1121   ALTSGPT  30   ASTSGOT  31   ALKPHOSPHAT  61   TBILIRUBIN  0.9   LIPASE  31   GLUCOSE  162*     Recent Labs      08/15/18   1121   APTT  29.9   INR  0.99     Recent Labs      08/15/18   1121   BNPBTYPENAT  68         Lab Results   Component Value Date    TROPONINI 0.14 (H) 08/15/2018       EKG: Marked ST elevation in V1 and V2 with ST depression inferiorly and laterally    Imaging:  No orders to display         Assessment/Plan:  I anticipate this patient will require at least two midnights for appropriate medical management, necessitating inpatient admission.    * STEMI (ST elevation myocardial infarction) (HCC)- (present on admission)   Assessment & Plan    With emergent catheterization and 2 stents to the LAD by Dr. Purvis.  Angiomax infusion followed by dual antiplatelet therapy.   High-intensity statin  Hold beta blocker due to hypotension.        Cardiac arrest (HCC)- (present on admission)   Assessment & Plan    Ventricular fibrillation arrest requiring defibrillation prior to admission. This was followed by Torsades and was given 2 grams of IV magnesium.  A BMP and magnesium have been ordered for the morning.        Cardiogenic shock (HCC)- (present on admission)   Assessment & Plan    Requiring IV Thad-synephrine since cath lab        Hypokalemia- (present on admission)   Assessment & Plan    K was 3.5 on admit and will be replaced with repeat BMP in the morning.         Diabetes mellitus  type 2, controlled (HCC)- (present on admission)   Assessment & Plan    Diet-controlled.         Dyslipidemia- (present on admission)   Assessment & Plan    History of  High-intensity statin  Lipid panel ordered.         Hypertension- (present on admission)   Assessment & Plan    On outpatient amlodipine-benazepril 5-20 as well as triamterene-tctz 37.5-25 daily.            VTE prophylaxis: hold due to Angiomax

## 2018-08-15 NOTE — CONSULTS
DATE OF SERVICE:  08/15/2018    CHIEF COMPLAINT:  Chest pain.    HISTORY OF PRESENT ILLNESS:  The patient is a 73-year-old gentleman   transferred from [facility] with chest pain and evidence of a ST elevation   myocardial infarction.  Last night at about 1:30, he developed mild chest   discomfort and was uncomfortable.  He walks around the house, but the pain   would not get better.    This morning, the pain intensified and was severe.  The pain was a dull   pressure-like sensation in his chest with radiation to shoulders bilaterally.    He presented to the emergency room at [facility] where EKG showed evidence   of acute anterior wall MI with hyperacute changes of V2.  Patient had a   sudden cardiac arrest, was successfully cardioverted.  Rhythm strips from that   arrest revealed patient had torsades de pointes type of arrhythmia.    There is no antecedent history of known coronary artery disease or chest pain.    His cardiac risk factor profile is negative for smoking, diabetes, or family   history of premature coronary artery disease.  Positive for hypertension and   hyperlipidemia.    PAST MEDICAL HISTORY:  ALLERGIES:  AUGMENTIN, CLINDAMYCIN, FLAGYL, VANCOMYCIN.    SURGERIES:  Exploratory laparotomy for GI bleeding.    MEDICATIONS:  On admission, amlodipine/benazepril 5/20 mg a day, atorvastatin   20 mg a day, probiotic, sildenafil citrate as needed,   triamterene/hydrochlorothiazide 37.5/25 mg daily.    SOCIAL HISTORY:  Patient is a nonsmoker and does not exercise regularly.    REVIEW OF SYSTEMS:  NEUROLOGIC:  No history of stroke or TIA.  GASTROINTESTINAL:  Remote history of GI bleeding.  No history of recent   melena.  RENAL:  No history of kidney impairment or kidney stones.  MUSCULOSKELETAL:  No recent trauma.  All others reviewed and negative.    PHYSICAL EXAMINATION:  GENERAL:  Anxious gentleman, otherwise no significant distress.  No   significant chest pain at the moment.  HEENT:  Pupils are  equal, gaze conjugate, sclerae nonicteric.  NECK:  No JVD.  LUNGS:  Clear.  HEART:  Regular rate and rhythm, normal S1 and S2.  There is no murmur.  ABDOMEN:  Soft, protuberant and no palpable masses or pain to palpation.  EXTREMITIES:  No edema.  Posterior tibial pulses are easily palpable   bilaterally.  Musculature is normal.  SKIN:  Warm and dry.  NEUROLOGIC:  Patient is alert and cooperative.  Cranial nerves are intact.    EKG:  From [facility] personally reviewed and demonstrates sinus rhythm   with hyperacute ST segment elevation in V2, reciprocal ST segment depression   compatible with an acute anterior wall myocardial infarction.    PERTINENT LABORATORY DATA:  Hemoglobin is 15.4.  Potassium is 3.5, GFR 60,   calcium is 10.4.  BNP is 60.  Initial troponin of 0.14.    IMPRESSION:  1.  ST segment elevation myocardial infarction.  Patient presented with chest   pain and bilateral shoulder pain.  EKG is compatible with acute ST segment   elevation myocardial infarction in the anterior wall.  He will be taken   emergently to the cath lab for coronary angiography and intervention.    Mallampati score is 1.    The cardiac catheterization procedure was explained to the patient.  The   inherent risks of stroke, heart attack, conscious sedation, and bleeding were   discussed.  2.  History of hyperlipidemia.  3.  History of chest pain.  4.  Remote gastrointestinal bleeding.  5.  Mild hypokalemia.  This will be repleted.  6.  Torsades de pointes ventricular tachycardia earlier today.       ____________________________________     MD BOBBI CORMIER / WYATT    DD:  08/15/2018 12:29:31  DT:  08/15/2018 13:22:56    D#:  [id number]  Job#:  [id number]

## 2018-08-15 NOTE — ED PROVIDER NOTES
"ED Provider Note    CHIEF COMPLAINT  Chief Complaint   Patient presents with   • Shoulder Pain     bilateral shoulder pain that radiates down to hands x a few weeks. pt states worse on the right and has increasingly gotten worse.   • Chest Pain     started this morning while he was in the shower. states it feels like its his bones. states had an episode of nausea with it. denies SOB       HPI  Vinod Conrad is a 73 y.o. male who presents to the emergency department the chief complaint of chest pain and shoulder pain.  The patient says that he had some shoulder pain associated with mid chest pain.  This seems to have started last night but then it eased up.  Patient took a shower this morning was feeling fine.  Then he walked to the mailbox and developed some nausea associated associated with clamminess    REVIEW OF SYSTEMS  See HPI for further details. All other systems are negative.     PAST MEDICAL HISTORY  Past Medical History:   Diagnosis Date   • Allergy    • Anesthesia 2006    hallucinations post anesthesia, at home   • Arthritis     all joints   • CATARACT     bilateral IOL   • Hyperlipidemia    • Hypertension    • Infectious disease 2011    c diff   • Pain    • Unspecified hemorrhagic conditions 2006    \"abd bleeding\"       FAMILY HISTORY  No family history on file.    SOCIAL HISTORY  Social History     Social History   • Marital status:      Spouse name: N/A   • Number of children: N/A   • Years of education: N/A     Social History Main Topics   • Smoking status: Former Smoker     Packs/day: 2.00     Years: 30.00     Types: Cigarettes     Quit date: 1/1/1998   • Smokeless tobacco: Never Used   • Alcohol use 0.0 oz/week      Comment: 5 per week   • Drug use: No   • Sexual activity: Not on file     Other Topics Concern   • Not on file     Social History Narrative   • No narrative on file       SURGICAL HISTORY  Past Surgical History:   Procedure Laterality Date   • CERVICAL FORAMINOTOMY  " "5/20/2013    Performed by Krish Yang M.D. at SURGERY [facility] ORS   • OTHER  2010    torn retina RIGHT EYE   • SCLERAL BUCKLING  1/13/2009    Performed by YVETTE DRAPER at SURGERY SAME DAY [facility] ORS   • OTHER ORTHOPEDIC SURGERY  2005    l knee       CURRENT MEDICATIONS  Home Medications    **Home medications have not yet been reviewed for this encounter**         ALLERGIES  Allergies   Allergen Reactions   • Aspirin Anaphylaxis     INTERNAL BLEEDING   • Augmentin Vomiting   • Clindamycin      Develop c.diff   • Metronidazole      Not sure   • Nsaids    • Vancomycin        PHYSICAL EXAM  VITAL SIGNS: BP (!) 198/117   Pulse 94   Resp 18   Ht 1.854 m (6' 1\")   Wt 91.4 kg (201 lb 8 oz)   SpO2 96%   BMI 26.58 kg/m²   Constitutional: Well developed, Well nourished, No acute distress, mild distress.   HENT: Normocephalic, Atraumatic, Bilateral external ears normal, Oropharynx moist, No oral exudates, Nose normal.   Eyes: PERRLA, EOMI, Conjunctiva normal, No discharge.   Neck: Normal range of motion, No tenderness, Supple, No stridor.   Cardiovascular: Regular rate and rhythm, no audible murmur.  Thorax & Lungs: Clear to auscultation bilaterally.  No rales, rhonchi, or wheezing  Abdomen: Bowel sounds normal, Soft, No tenderness, No masses, No pulsatile masses.   Skin: Warm, Dry, No erythema, No rash.   Back: No tenderness, No CVA tenderness.   Extremities: Intact distal pulses, No tenderness, No cyanosis, No clubbing.  No edema.  No calf tenderness  Neurologic: Alert & oriented x 3, Normal motor function, Normal sensory function, No focal deficits noted.     EKG  See below    RADIOLOGY/PROCEDURES  DX-CHEST-PORTABLE (1 VIEW)   Final Result      No radiographic evidence of acute cardiopulmonary process.        Results for orders placed or performed during the hospital encounter of 08/15/18   CBC w/ Differential   Result Value Ref Range    WBC 10.8 4.8 - 10.8 K/uL    RBC 5.00 4.70 - 6.10 M/uL    Hemoglobin " 15.4 14.0 - 18.0 g/dL    Hematocrit 45.2 42.0 - 52.0 %    MCV 90.4 81.4 - 97.8 fL    MCH 30.8 27.0 - 33.0 pg    MCHC 34.1 33.7 - 35.3 g/dL    RDW 42.1 35.9 - 50.0 fL    Platelet Count 232 164 - 446 K/uL    MPV 9.5 9.0 - 12.9 fL    Neutrophils-Polys 57.00 44.00 - 72.00 %    Lymphocytes 31.90 22.00 - 41.00 %    Monocytes 8.80 0.00 - 13.40 %    Eosinophils 1.50 0.00 - 6.90 %    Basophils 0.50 0.00 - 1.80 %    Immature Granulocytes 0.30 0.00 - 0.90 %    Nucleated RBC 0.00 /100 WBC    Neutrophils (Absolute) 6.17 1.82 - 7.42 K/uL    Lymphs (Absolute) 3.45 1.00 - 4.80 K/uL    Monos (Absolute) 0.95 (H) 0.00 - 0.85 K/uL    Eos (Absolute) 0.16 0.00 - 0.51 K/uL    Baso (Absolute) 0.05 0.00 - 0.12 K/uL    Immature Granulocytes (abs) 0.03 0.00 - 0.11 K/uL    NRBC (Absolute) 0.00 K/uL   Complete Metabolic Panel (CMP)   Result Value Ref Range    Sodium 137 135 - 145 mmol/L    Potassium 3.5 (L) 3.6 - 5.5 mmol/L    Chloride 101 96 - 112 mmol/L    Co2 25 20 - 33 mmol/L    Anion Gap 11.0 0.0 - 11.9    Glucose 162 (H) 65 - 99 mg/dL    Bun 17 8 - 22 mg/dL    Creatinine 0.90 0.50 - 1.40 mg/dL    Calcium 10.4 (H) 8.4 - 10.2 mg/dL    AST(SGOT) 31 12 - 45 U/L    ALT(SGPT) 30 2 - 50 U/L    Alkaline Phosphatase 61 30 - 99 U/L    Total Bilirubin 0.9 0.1 - 1.5 mg/dL    Albumin 4.3 3.2 - 4.9 g/dL    Total Protein 7.3 6.0 - 8.2 g/dL    Globulin 3.0 1.9 - 3.5 g/dL    A-G Ratio 1.4 g/dL   Btype Natriuretic Peptide   Result Value Ref Range    B Natriuretic Peptide 68 0 - 100 pg/mL   Troponin STAT   Result Value Ref Range    Troponin I 0.14 (H) 0.00 - 0.04 ng/mL   Lipase   Result Value Ref Range    Lipase 31 7 - 58 U/L   PT/INR   Result Value Ref Range    PT 13.0 12.0 - 14.6 sec    INR 0.99 0.87 - 1.13   APTT   Result Value Ref Range    APTT 29.9 24.7 - 36.0 sec   ESTIMATED GFR   Result Value Ref Range    GFR If African American >60 >60 mL/min/1.73 m 2    GFR If Non African American >60 >60 mL/min/1.73 m 2   EKG (ER)   Result Value Ref Range     Report       [facility] Emergency Dept.    Test Date:  2018-08-15  Pt Name:    RAINE ERNST               Department: EDSM  MRN:        [id number]                      Room:       SM-OFF THE FLOOR  Gender:     Male                         Technician: CECILY  :        [id number]                   Requested By:SHANNA JAIME  Order #:    [id number]                    Reading MD: SHANNA JAIME MD    Measurements  Intervals                                Axis  Rate:       92                           P:          71  NV:         183                          QRS:        32  QRSD:       81                           T:          -28  QT:         338  QTc:        419    Interpretive Statements  Sinus rhythm  Repol abnrm, global ischemia, diffuse leads  ST elevation in lead V2 and aVL.  Concerning for a likely ST segment  elevation  myocardial infarction  ST segment depression in V5 and V6 and 2, 3, and aVF concerning for  reciprocal  changes  ST (T wave) deviation now present  Vent ricular premature complex(es) no longer present  ST segment elevation myocardial infarction  Electronically Signed On 8- 12:23:29 PDT by SHANNA JAIME MD           COURSE & MEDICAL DECISION MAKING  Pertinent Labs & Imaging studies reviewed. (See chart for details)    Patient presents today with chest and shoulder pain.  Initial EKG is concerning for an ST segment elevation myocardial infarction.  I was in the room when the EKG was being performed once a printed out I activated a STEMI alert.  Patient has a history of GI bleed and says that that is why he normally does not take aspirin I discussed with the patient and I feel that aspirin would likely be beneficial for him at this time.  Dose of aspirin was administered in the emergency department.    I contacted the on-call cardiologist and called [facility] for transport.  While waiting for [facility] to arrive the patient had a V. fib arrest.  I was called emergently to  the room and attempted precordial thump while initiating other resuscitative efforts.  CPR was initiated.  The Zoll monitor confirmed ventricular fibrillation.  The patient was defibrillated at 200 J and CPR was continued.  One dose of epinephrine was administered.  Rhythm check confirmed continued ventricular fibrillation.  The patient was once again defibrillated at 200 J.  CPR was continued.  While the patient was undergoing CPR he regained consciousness.  He was able to answer questions and sit up.  The patient and started vomiting.  He was suctioned.  He was administered Zofran.  The patient was given 150 mg of IV amiodarone.    Following his resuscitation efforts the patient is now sitting up and talking.  He is answering questions appropriately.  Initially I was planning for [facility] transport but after the cardiac arrest and return of spontaneous circulation I requested critical care transport.  Amiodarone drip has been initiated.  I reviewed rhythm strips.  There is a rhythm strip that is consistent with torsades and therefore 2 g of magnesium sulfate was administered.    11:53 AM I initially spoke with Dr. Garcia who was accepting the patient for transfer and after the arrest I notified her of the events in the intervening time.  We are awaiting critical care transport at this time for transport for percutaneous intervention.  I spoke with Dr. Allen and he plans to administer heparin on arrival at [facility].    CRITICAL CARE  I provided critical care services, which included medication orders, frequent reevaluations of the patient's condition and response to treatment, ordering and reviewing test results, and discussing the case with various consultants.  The critical care time associated with the care of the patient was 35 minutes. Review chart for interventions. This time is exclusive of any other billable procedures.        FINAL IMPRESSION  1. ST elevation myocardial infarction  (STEMI), unspecified artery (HCC)    2. Cardiac arrest (HCC)              Electronically signed by: Ravindra Juan, 8/15/2018 11:23 AM

## 2018-08-15 NOTE — ED PROVIDER NOTES
ED Provider Note    CHIEF COMPLAINT  STEMI    HPI  Vinod Conrad is a 73 y.o. male with a history of hypertension and dyslipidemia also with peptic ulcer disease in the past secondary to aspirin who presents from [facility] ER where he presented with a STEMI.    Patient states he developed centralized chest pain at 130 this morning.  He walked around his house with mild pain at that time but felt restless.  His pain became much worse this morning and this caused him to go to the ER.    Per report from Dr. Juan at [facility], the patient arrested prior to transfer.  Patient had a V. fib arrest.  He was defibrillated twice given epi ×1, 2 g of magnesium for what appeared to be torsades and started on amiodarone drip.      ALLERGIES  Allergies   Allergen Reactions   • Aspirin Anaphylaxis     INTERNAL BLEEDING   • Augmentin Vomiting   • Clindamycin      Develop c.diff   • Metronidazole      Not sure   • Nsaids Unspecified     GI bleed   • Vancomycin Swelling       CURRENT MEDICATIONS  Home Medications    **Home medications have not yet been reviewed for this encounter**         PAST MEDICAL HISTORY   has a past medical history of Allergy; Anesthesia (2006); Arthritis; CATARACT; Hyperlipidemia; Hypertension; Infectious disease (2011); Pain; and Unspecified hemorrhagic conditions (2006).    SURGICAL HISTORY   has a past surgical history that includes scleral buckling (1/13/2009); other orthopedic surgery (2005); other (2010); and cervical foraminotomy (5/20/2013).    SOCIAL HISTORY  Social History     Social History Main Topics   • Smoking status: Former Smoker     Packs/day: 2.00     Years: 30.00     Types: Cigarettes     Quit date: 1/1/1998   • Smokeless tobacco: Never Used   • Alcohol use 0.0 oz/week      Comment: 5 per week   • Drug use: No   • Sexual activity: Not on file         REVIEW OF SYSTEMS  See HPI for further details.  All other systems are negative except as above in HPI.    PHYSICAL  "EXAM  VITAL SIGNS: /70   Pulse 78   Temp 37.2 °C (98.9 °F)   Resp 20   Ht 1.854 m (6' 1\")   Wt 95.1 kg (209 lb 10.5 oz)   SpO2 91%   BMI 27.66 kg/m²     General:  WDWN, nontoxic-appearing; A+Ox3; V/S as above   Skin: warm and dry; good color; no rash  HEENT: NCAT; EOMs intact; PERRL; no scleral icterus   Neck: FROM; no meningismus, no JVD  Cardiovascular: Regular heart rate and rhythm.  No murmurs, rubs, or gallops; pulses 2+ bilaterally radially and DP areas  Lungs: Clear to auscultation with good air movement bilaterally.  No wheezes, rhonchi, or rales.   Abdomen: BS present; soft; NTND; no rebound, guarding, or rigidity.  No organomegaly or pulsatile mass   Extremities: MEJÍA x 4; no e/o trauma; no pedal edema; neg Michel's  Neurologic: CNs III-XII grossly intact; speech clear; distal sensation intact; strength 5/5 UE/LEs  Psychiatric: Appropriate affect, normal mood    LABS  none      MEDICAL RECORD  I have reviewed patient's medical record and pertinent results are listed below.      COURSE & MEDICAL DECISION MAKING  I have reviewed any medical record information, laboratory studies and radiographic results as noted.    Vinod Conrad is a 73 y.o. male who presents as a STEMI transfer from [facility].  We received report that the patient would be transferred for STEMI.  Shortly thereafter, we received report that the patient had coded while still at [facility].  Patient had a V. fib arrest but was resuscitated and had ROSC.    Dr. Allen present in trauma bay upon pt arrival--- EKG canceled by cardiology as patient was immediately transported to the Cath Lab.    12:40 PM  Hospitalist, Dr. Mata, aware of patient's need for admission    The total critical care time on this patient is 40 minutes, resuscitating patient, speaking with admitting physician, and deciphering test results. This 40 minutes is exclusive of separately billable procedures.      FINAL IMPRESSION  1. ST elevation " myocardial infarction (STEMI), unspecified artery (HCC)    2. Ventricular fibrillation (HCC)      Electronically signed by: Barbie Garcia, 8/15/2018 12:24 PM

## 2018-08-15 NOTE — ED NOTES
Patient reports chest pain that started this morning, reports bilateral shoulder pain. EKG completed

## 2018-08-15 NOTE — ASSESSMENT & PLAN NOTE
Ventricular fibrillation arrest requiring defibrillation prior to admission. This was followed by Torsades and was given 2 grams of IV magnesium.  A BMP and magnesium have been ordered for the morning.

## 2018-08-15 NOTE — CARE PLAN
Problem: Safety  Goal: Will remain free from injury  Outcome: PROGRESSING AS EXPECTED  Pt understands need to use call light and verbalizes understanding of not getting up without calling.      Problem: Respiratory:  Goal: Respiratory status will improve  Outcome: PROGRESSING AS EXPECTED  Pt being weaned from oxygen and sats above 96%

## 2018-08-15 NOTE — ASSESSMENT & PLAN NOTE
Had two drug eluding stents placed to LAD  Atorvastatin, lisinopril, ASA, Effient, metoprolol  Diet and lifestyle modifications discussed  Discussed importance of effient and asa and to discuss with cardiology if he has to stop for any adverse reason.  Aware no elective surgery for one year unless approved by cardiologist.  Monitor on telemetry  Needs Echo prior to discharge.

## 2018-08-15 NOTE — ASSESSMENT & PLAN NOTE
Diet-controlled.   Discussed lifestyle and dietary changes to benefit further weight loss and cardiogenic improvement

## 2018-08-15 NOTE — ASSESSMENT & PLAN NOTE
Initially required Thad-Synephrine more going into cardiac catheterization  He has since then improved and been able to be weaned off  Referring vitals and on telemetry  Await echocardiogram  No arrhythmia or PVCs noted of significance

## 2018-08-15 NOTE — ED NOTES
Med rec updated and complete  Allergies reviewed  Interviewed pt with wife at bedside with permission from pt.  Pts wife had a list of medications went over list of medications and returned list of medications back to pts wife.  Pt reports no antibiotics in the last 30 days.

## 2018-08-15 NOTE — ASSESSMENT & PLAN NOTE
Change outpatient amlodipine-benazepril 5-20 and triamterene-tctz 37.5-25 to lisinopril 5mg and metoprolol 25mg BID  Monitor vitals

## 2018-08-15 NOTE — ED NOTES
Care flight arrived to  ED, report given, pt awake and alert, transfer to [facility]

## 2018-08-16 LAB
ANION GAP SERPL CALC-SCNC: 9 MMOL/L (ref 0–11.9)
BUN SERPL-MCNC: 13 MG/DL (ref 8–22)
CALCIUM SERPL-MCNC: 8.6 MG/DL (ref 8.5–10.5)
CHLORIDE SERPL-SCNC: 104 MMOL/L (ref 96–112)
CHOLEST SERPL-MCNC: 126 MG/DL (ref 100–199)
CO2 SERPL-SCNC: 26 MMOL/L (ref 20–33)
CREAT SERPL-MCNC: 0.65 MG/DL (ref 0.5–1.4)
EKG IMPRESSION: NORMAL
ERYTHROCYTE [DISTWIDTH] IN BLOOD BY AUTOMATED COUNT: 44.2 FL (ref 35.9–50)
GLUCOSE SERPL-MCNC: 138 MG/DL (ref 65–99)
HCT VFR BLD AUTO: 41.3 % (ref 42–52)
HDLC SERPL-MCNC: 55 MG/DL
HGB BLD-MCNC: 13.7 G/DL (ref 14–18)
LDLC SERPL CALC-MCNC: 51 MG/DL
MAGNESIUM SERPL-MCNC: 1.8 MG/DL (ref 1.5–2.5)
MCH RBC QN AUTO: 30.6 PG (ref 27–33)
MCHC RBC AUTO-ENTMCNC: 33.2 G/DL (ref 33.7–35.3)
MCV RBC AUTO: 92.4 FL (ref 81.4–97.8)
PLATELET # BLD AUTO: 192 K/UL (ref 164–446)
PMV BLD AUTO: 10.1 FL (ref 9–12.9)
POTASSIUM SERPL-SCNC: 3.7 MMOL/L (ref 3.6–5.5)
RBC # BLD AUTO: 4.47 M/UL (ref 4.7–6.1)
SODIUM SERPL-SCNC: 139 MMOL/L (ref 135–145)
TRIGL SERPL-MCNC: 101 MG/DL (ref 0–149)
TROPONIN I SERPL-MCNC: 20.33 NG/ML (ref 0–0.04)
WBC # BLD AUTO: 12.4 K/UL (ref 4.8–10.8)

## 2018-08-16 PROCEDURE — 700102 HCHG RX REV CODE 250 W/ 637 OVERRIDE(OP): Performed by: INTERNAL MEDICINE

## 2018-08-16 PROCEDURE — G8980 MOBILITY D/C STATUS: HCPCS | Mod: CH

## 2018-08-16 PROCEDURE — 85027 COMPLETE CBC AUTOMATED: CPT

## 2018-08-16 PROCEDURE — 99232 SBSQ HOSP IP/OBS MODERATE 35: CPT | Performed by: HOSPITALIST

## 2018-08-16 PROCEDURE — A9270 NON-COVERED ITEM OR SERVICE: HCPCS | Performed by: INTERNAL MEDICINE

## 2018-08-16 PROCEDURE — 80061 LIPID PANEL: CPT

## 2018-08-16 PROCEDURE — 83735 ASSAY OF MAGNESIUM: CPT

## 2018-08-16 PROCEDURE — 97161 PT EVAL LOW COMPLEX 20 MIN: CPT

## 2018-08-16 PROCEDURE — 93306 TTE W/DOPPLER COMPLETE: CPT | Mod: 26 | Performed by: INTERNAL MEDICINE

## 2018-08-16 PROCEDURE — 93010 ELECTROCARDIOGRAM REPORT: CPT | Performed by: INTERNAL MEDICINE

## 2018-08-16 PROCEDURE — G8978 MOBILITY CURRENT STATUS: HCPCS | Mod: CH

## 2018-08-16 PROCEDURE — 700111 HCHG RX REV CODE 636 W/ 250 OVERRIDE (IP): Performed by: HOSPITALIST

## 2018-08-16 PROCEDURE — G8979 MOBILITY GOAL STATUS: HCPCS | Mod: CH

## 2018-08-16 PROCEDURE — 80048 BASIC METABOLIC PNL TOTAL CA: CPT

## 2018-08-16 PROCEDURE — 84484 ASSAY OF TROPONIN QUANT: CPT

## 2018-08-16 PROCEDURE — 770020 HCHG ROOM/CARE - TELE (206)

## 2018-08-16 PROCEDURE — 93005 ELECTROCARDIOGRAM TRACING: CPT | Performed by: INTERNAL MEDICINE

## 2018-08-16 RX ORDER — LISINOPRIL 5 MG/1
5 TABLET ORAL
Status: DISCONTINUED | OUTPATIENT
Start: 2018-08-16 | End: 2018-08-17 | Stop reason: HOSPADM

## 2018-08-16 RX ORDER — MAGNESIUM SULFATE HEPTAHYDRATE 40 MG/ML
2 INJECTION, SOLUTION INTRAVENOUS ONCE
Status: DISCONTINUED | OUTPATIENT
Start: 2018-08-16 | End: 2018-08-16

## 2018-08-16 RX ADMIN — METOPROLOL TARTRATE 25 MG: 25 TABLET, FILM COATED ORAL at 11:28

## 2018-08-16 RX ADMIN — PRASUGREL 10 MG: 10 TABLET, FILM COATED ORAL at 05:30

## 2018-08-16 RX ADMIN — ASPIRIN 325 MG: 325 TABLET, COATED ORAL at 05:30

## 2018-08-16 RX ADMIN — LISINOPRIL 5 MG: 5 TABLET ORAL at 11:28

## 2018-08-16 RX ADMIN — ATORVASTATIN CALCIUM 80 MG: 80 TABLET, FILM COATED ORAL at 20:34

## 2018-08-16 RX ADMIN — METOPROLOL TARTRATE 25 MG: 25 TABLET, FILM COATED ORAL at 17:49

## 2018-08-16 RX ADMIN — MAGNESIUM SULFATE IN WATER 2 G: 40 INJECTION, SOLUTION INTRAVENOUS at 09:23

## 2018-08-16 ASSESSMENT — ENCOUNTER SYMPTOMS
SHORTNESS OF BREATH: 0
WEAKNESS: 0
CARDIOVASCULAR NEGATIVE: 1
NEUROLOGICAL NEGATIVE: 1
FEVER: 0

## 2018-08-16 ASSESSMENT — COGNITIVE AND FUNCTIONAL STATUS - GENERAL
SUGGESTED CMS G CODE MODIFIER MOBILITY: CH
MOBILITY SCORE: 24

## 2018-08-16 ASSESSMENT — PAIN SCALES - GENERAL
PAINLEVEL_OUTOF10: 1
PAINLEVEL_OUTOF10: 1
PAINLEVEL_OUTOF10: 0
PAINLEVEL_OUTOF10: 0
PAINLEVEL_OUTOF10: 1
PAINLEVEL_OUTOF10: 0
PAINLEVEL_OUTOF10: 0
PAINLEVEL_OUTOF10: 1

## 2018-08-16 ASSESSMENT — GAIT ASSESSMENTS
GAIT LEVEL OF ASSIST: SUPERVISED
DISTANCE (FEET): 600

## 2018-08-16 ASSESSMENT — PATIENT HEALTH QUESTIONNAIRE - PHQ9
2. FEELING DOWN, DEPRESSED, IRRITABLE, OR HOPELESS: NOT AT ALL
1. LITTLE INTEREST OR PLEASURE IN DOING THINGS: NOT AT ALL
SUM OF ALL RESPONSES TO PHQ9 QUESTIONS 1 AND 2: 0

## 2018-08-16 NOTE — THERAPY
"Physical Therapy Evaluation completed.   Bed Mobility:  Supine to Sit: Supervised  Transfers: Sit to Stand: Supervised  Gait: Level Of Assist: Supervised with No Equipment Needed       Plan of Care: Patient with no further skilled PT needs in the acute care setting at this time  Discharge Recommendations: Equipment: No Equipment Needed. Post-acute therapy Currently anticipate no further skilled therapy needs once patient is discharged from the inpatient setting.    Post acute cardiac event education completed.     See \"Rehab Therapy-Acute\" Patient Summary Report for complete documentation.     "

## 2018-08-16 NOTE — PROGRESS NOTES
[facility] Hospitalist Progress Note    Date of Service: 2018    Chief Complaint  73 y.o. male w/ hx of htn, arthritis admitted 8/15/2018 with chest pain .  Upon arrival to [facility] he had findings of STEMI and upon transfer to [facility] he developed V.fib arrest and received defibrillation and given epinephrine and then went into torsades.  He went directly to angiogram and had two stents to the LAD.    Interval Problem Update  A+Ox4  Sore chest  Up and ambulates on own.  Showered this am.  BM today  Adequate UOP  Wife at bedside  Await echocardiogram.  Speech is clear he is alert and oriented no current complaints. His wife was at bedside and we discussed lifestyle modifications as well as dietary changes that would benefit for decreasing his cardio vascular risk. We also discussed the importance of medical adherence. Patient will need to follow up with cardiology in the next 1-2 weeks after discharge.  Case discussed in a.m. ICU multidisciplinary rounds    Consultants/Specialty  cardiology    Disposition  Transfer to telemetry.  Possible early am discharge tomorrow        Review of Systems   Constitutional: Negative for fever.   Neurological: Negative for weakness.      Physical Exam  Laboratory/Imaging   Hemodynamics  Temp (24hrs), Av.8 °C (98.3 °F), Min:36.7 °C (98.1 °F), Max:36.9 °C (98.5 °F)   Temperature: 36.8 °C (98.2 °F)  Pulse  Av.6  Min: 65  Max: 78 Heart Rate (Monitored): 76  NIBP: 119/77      Respiratory      Respiration: (!) 23, Pulse Oximetry: 93 %        RUL Breath Sounds: Diminished, RML Breath Sounds: Diminished, RLL Breath Sounds: Diminished, LELAND Breath Sounds: Diminished, LLL Breath Sounds: Diminished    Fluids    Intake/Output Summary (Last 24 hours) at 18 1807  Last data filed at 18 1700   Gross per 24 hour   Intake             2190 ml   Output             2250 ml   Net              -60 ml       Nutrition  Orders Placed This Encounter   Procedures   • Diet  Order Cardiac     Standing Status:   Standing     Number of Occurrences:   1     Order Specific Question:   Diet:     Answer:   Cardiac [6]     Physical Exam   Constitutional: He is oriented to person, place, and time. He appears well-developed and well-nourished. No distress.   HENT:   Head: Normocephalic and atraumatic.   Nose: Nose normal.   Mouth/Throat: No oropharyngeal exudate.   Eyes: Conjunctivae and EOM are normal. Right eye exhibits no discharge. Left eye exhibits no discharge.   Neck: No tracheal deviation present.   Cardiovascular: Normal rate, regular rhythm, normal heart sounds and intact distal pulses.    No murmur heard.  Pulmonary/Chest: Effort normal and breath sounds normal. No stridor. No respiratory distress. He has no wheezes. He has no rales.   Abdominal: Soft. Bowel sounds are normal. He exhibits no distension. There is no tenderness.   Musculoskeletal: He exhibits no edema.   Neurological: He is alert and oriented to person, place, and time. No cranial nerve deficit.   Skin: Skin is warm. He is not diaphoretic.   Psychiatric: He has a normal mood and affect. His behavior is normal. Thought content normal.   Vitals reviewed.      Recent Labs      08/15/18   1121  08/16/18   0415   WBC  10.8  12.4*   RBC  5.00  4.47*   HEMOGLOBIN  15.4  13.7*   HEMATOCRIT  45.2  41.3*   MCV  90.4  92.4   MCH  30.8  30.6   MCHC  34.1  33.2*   RDW  42.1  44.2   PLATELETCT  232  192   MPV  9.5  10.1     Recent Labs      08/15/18   1121  08/16/18   0415   SODIUM  137  139   POTASSIUM  3.5*  3.7   CHLORIDE  101  104   CO2  25  26   GLUCOSE  162*  138*   BUN  17  13   CREATININE  0.90  0.65   CALCIUM  10.4*  8.6     Recent Labs      08/15/18   1121   APTT  29.9   INR  0.99     Recent Labs      08/15/18   1121   BNPBTYPENAT  68     Recent Labs      08/16/18   0415   TRIGLYCERIDE  101   HDL  55   LDL  51          Assessment/Plan     * STEMI (ST elevation myocardial infarction) (HCC)- (present on admission)    Assessment & Plan    Had two drug eluding stents placed to LAD  Atorvastatin, lisinopril, ASA, Effient, metoprolol  Diet and lifestyle modifications discussed  Discussed importance of effient and asa and to discuss with cardiology if he has to stop for any adverse reason.  Aware no elective surgery for one year unless approved by cardiologist.  Monitor on telemetry  Needs Echo prior to discharge.        Cardiac arrest (HCC)- (present on admission)   Assessment & Plan    Ventricular fibrillation arrest requiring defibrillation prior to admission. This was followed by Torsades and was given 2 grams of IV magnesium.  A BMP and magnesium have been ordered for the morning.        Cardiogenic shock (HCC)- (present on admission)   Assessment & Plan    Initially required Thad-Synephrine more going into cardiac catheterization  He has since then improved and been able to be weaned off  Referring vitals and on telemetry  Await echocardiogram  No arrhythmia or PVCs noted of significance        Diabetes mellitus type 2, controlled ([facility])- (present on admission)   Assessment & Plan    Diet-controlled.   Discussed lifestyle and dietary changes to benefit further weight loss and cardiogenic improvement        Dyslipidemia- (present on admission)   Assessment & Plan    Discussed lifestyle and dietary changes  High-intensity statin  8/16 Total cholesterol 126 and LDL 51          Hypertension- (present on admission)   Assessment & Plan    Change outpatient amlodipine-benazepril 5-20 and triamterene-tctz 37.5-25 to lisinopril 5mg and metoprolol 25mg BID  Monitor vitals        Hypokalemia- (present on admission)   Assessment & Plan    Improved.  Monitor          Quality-Core Measures   Reviewed items::  Labs reviewed, EKG reviewed, Medications reviewed and Radiology images reviewed  Tong catheter::  No Tong  DVT prophylaxis pharmacological::  Not indicated at this time, ambulatory

## 2018-08-16 NOTE — PROGRESS NOTES
Cardiology Progress Note               Author: Omero Allen Date & Time created: 2018  10:17 AM     Interval History:  Chief complaint chest pain:  Transferred from [facility] yesterday with extensive anterior wall MI.  This was complicated by ventricular tachycardia while at [facility] that required cardioversion ×1.  Angiography revealed complete occlusion of the LAD at the origin.  This was stented with good result.  Overnight he has felt well without dyspnea.  No VT on the monitor.  No hematoma at the arterial puncture site.    Review of Systems   Constitutional: Negative for fever.   Respiratory: Negative for shortness of breath.    Cardiovascular: Negative.    Neurological: Negative.    Endo/Heme/Allergies: Negative.        Physical Exam   Constitutional: He is oriented to person, place, and time. He appears well-developed and well-nourished. No distress.   HENT:   Head: Atraumatic.   Eyes: Pupils are equal, round, and reactive to light. Conjunctivae and EOM are normal.   Neck: Neck supple. No JVD present.   Cardiovascular: Normal rate and regular rhythm.    No murmur heard.  No hematoma at the puncture site of the right radial artery   Pulmonary/Chest: Effort normal and breath sounds normal. No respiratory distress. He has no wheezes. He has no rales.   Musculoskeletal: He exhibits no edema.   Neurological: He is alert and oriented to person, place, and time.   Skin: Skin is warm and dry. He is not diaphoretic.       Hemodynamics:  Temp (24hrs), Av.8 °C (98.2 °F), Min:36.7 °C (98 °F), Max:36.9 °C (98.5 °F)  Temperature: 36.9 °C (98.5 °F)  Pulse  Av.6  Min: 65  Max: 78Heart Rate (Monitored): 72  Blood Pressure : 120/70, NIBP: 122/65     Respiratory:    Respiration: (!) 22, Pulse Oximetry: 94 %        RUL Breath Sounds: Diminished, RML Breath Sounds: Diminished, RLL Breath Sounds: Diminished, LELAND Breath Sounds: Diminished, LLL Breath Sounds: Diminished  Fluids:  Date 18 0700 -  08/17/18 0659   Shift 4865-2001 5417-1855 1573-9688 24 Hour Total   I  N  T  A  K  E   P.O. 240   240    I.V. 150   150    Shift Total 390   390   O  U  T  P  U  T   Urine 300   300    Shift Total 300   300   Weight (kg) 95.3 95.3 95.3 95.3       Weight: 95.3 kg (210 lb 1.6 oz)  GI/Nutrition:  Orders Placed This Encounter   Procedures   • Diet Order Cardiac     Standing Status:   Standing     Number of Occurrences:   1     Order Specific Question:   Diet:     Answer:   Cardiac [6]     Lab Results:  Recent Labs      08/15/18   1121  08/16/18   0415   WBC  10.8  12.4*   RBC  5.00  4.47*   HEMOGLOBIN  15.4  13.7*   HEMATOCRIT  45.2  41.3*   MCV  90.4  92.4   MCH  30.8  30.6   MCHC  34.1  33.2*   RDW  42.1  44.2   PLATELETCT  232  192   MPV  9.5  10.1     Recent Labs      08/15/18   1121  08/16/18   0415   SODIUM  137  139   POTASSIUM  3.5*  3.7   CHLORIDE  101  104   CO2  25  26   GLUCOSE  162*  138*   BUN  17  13   CREATININE  0.90  0.65   CALCIUM  10.4*  8.6     Recent Labs      08/15/18   1121   APTT  29.9   INR  0.99     Recent Labs      08/15/18   1121   BNPBTYPENAT  68     Recent Labs      08/15/18   1121   TROPONINI  0.14*   BNPBTYPENAT  68     Recent Labs      08/16/18   0415   TRIGLYCERIDE  101   HDL  55   LDL  51         Medical Decision Making, by Problem:  Active Hospital Problems    Diagnosis   • *STEMI (ST elevation myocardial infarction) ([facility]) [I21.3]   • Cardiogenic shock ([facility]) [R57.0]   • Cardiac arrest ([facility]) [I46.9]   • Hypokalemia [E87.6]   • Hypertension [I10]   • Diabetes mellitus type 2, controlled ([facility]) [E11.9]   • Dyslipidemia [E78.5]       Plan:  Status post anterior wall ST elevation MI: Patient is doing well without any evidence of heart failure on exam.  LDL was only 51 on admission.  GFR is normal.  Repeat troponin is pending.  He will be started on ACE inhibitor per ACC guidelines.  Echo is pending.  The patient is doing well 24 hours post extensive anterior MI.  Consider discharge  tomorrow if no ventricular ectopy and no evidence of heart failure.    Quality-Core Measures

## 2018-08-16 NOTE — PROCEDURES
DATE OF SERVICE:  08/15/2018    PROCEDURE:  Cardiac catheterization and percutaneous coronary intervention.  A.  Left heart catheterization.  B.  Left ventriculography.  C.  Selective coronary angiography.  D.  Coronary stent implantation, proximal mid left anterior descending artery   with overlapping drug-eluting Xience stent 3.0x38 mm and 3.0x8 mm postdilated   to 3.6 mm.  E.  Right radial artery approach.    PREPROCEDURE DIAGNOSES:  1.  Acute ST elevation anterior myocardial infarction.  2.  Cardiogenic shock requiring vasopressor support.    POSTPROCEDURE DIAGNOSES:  1.  Coronary artery disease with proximal left anterior descending   artery occlusion.  2.  Left ventricular ejection fraction 45% with anterior apical and   inferoapical wall motion abnormalities.    PHYSICIAN:  Daniel Purvis MD    REFERRING PHYSICIAN:  Omero Allen MD    COMPLICATIONS:  None.    MEDICATIONS:  1.  Versed 2 mg IV.  2.  Fentanyl 150 mcg IV.  3.  Lidocaine 2% subcutaneous.  4.  Heparin 3000 units IA.  5.  Nitroglycerin 100 mcg IA.  6.  Verapamil 2.5 mg IA.  7.  Angiomax IV bolus and infusion.  8.  Integrilin IV bolus and infusion.  9.  Thad-Synephrine intravenous infusion.  10.  Prasugrel 60 mg p.o.    INDICATIONS:  The patient is a 73-year-old male who presented to [facility] with an acute ST elevation anterior myocardial   infarction, suffered a cardiac arrest related to torsades de pointes, was   successfully resuscitated and transferred to [facility]   to undergo an emergent cardiac catheterization.    DESCRIPTION OF PROCEDURE:  The patient was brought immediately to cardiac   catheterization laboratory where he was prepped, draped, and anesthetized in   usual manner.    After establishing the presence of adequate collateral circulation to the right hand with a   pressure and oximetry-guided Fly's test, the volar surface of the right   wrist was prepped, draped,  and anesthetized in usual manner.    Using a modified Seldinger technique, a 6-Chinese x 10 cm introducer sheath was   inserted in the right radial artery.  Heparin, verapamil, and nitroglycerin   were given via the side port.    IV Angiomax was started.     Opening arterial pressure was 88/54.  Thad-Synephrine was started.    Next, a 4-Chinese JR 4.0 right coronary catheter was inserted in the ostium of   right coronary and right coronary angiograms obtained in various projections.    Next, a 6-Chinese extra backup 3.5 guiding catheter was advanced to the ostium   of the left coronary artery and left coronary angiograms were obtained in   various projections.    Initial coronary angiograms demonstrated a proximal left anterior descending   artery 100% occlusion.    Next, Integrilin was given.    Next, a 0.014 Whisper wire was advanced to the distal left anterior descending   artery.    Next, a 2.5x15 mm balloon catheter was inserted across the proximal vessel   occlusion and inflated up to 14 atmospheres.  The balloon was removed.    Next, a 3.0x38 mm Xience drug-eluting stent was placed across the residual   stenosis and deployed at 12 atmospheres with a post-deployment inflation of 14   atmospheres.  The balloon was removed.    Next, a 3.0x8 mm Xience stent was placed just proximal to the initial stent   with slight overlap and deployed at 15 atmospheres.  The balloon was removed.    Next, a 3.5x15 mm noncompliant balloon catheter was inserted and inflations of   10 atmospheres were performed throughout the stented segment.    Next, a 3.5x8 mm noncompliant balloon catheter was inserted and inflations of   14 atmospheres were performed throughout the stented segment.    Next, a 4-Chinese pigtail catheter was inserted in the left ventricle under   fluoroscopic guidance.  A single plane MONTGOMERY left ventricular angiogram was   performed.  Pre and post angiogram LVEDP, LV, and aortic pressures were   obtained.    At the end  of procedure, catheters were removed and hemostasis was achieved   with wrist band device.  The patient tolerated the procedure well and was   transferred to CCU.    FINDINGS:  LEFT HEART PRESSURES:  HEMODYNAMICS:  1.  Initial blood pressure 88/54, mean of 55.  2.  LVEDP 33 mmHg on Thad-Synephrine.  3.  Left ventricular systolic pressure 112 mmHg.  4. Central aortic pressure systolic 104, diastolic 57, mean of 75 mmHg.    LEFT VENTRICULOGRAPHY:  Left ventricular chamber size appears enlarged with   akinesis of the anterior wall, anterior apex and inferior apex.  Calculated   ejection fraction 45% with no identifiable left ventricular thrombus or mitral   regurgitation.    CORONARY ARTERIOGRAPHY:  1.  Right coronary artery:  The right coronary artery is a moderate size   vessel, gives rise to a right ventricular branch, a posterior descending artery,   and a large posterolateral system.  The proximal mid right coronary artery has   diffuse mild calcific atheromatous disease, but the vessel is otherwise widely   patent.  2.  Left main artery:  Left main artery is moderate size angiographically   normal, bifurcates into a left anterior descending artery and circumflex   artery.  3.  Left anterior descending artery:  The left anterior descending artery is   100% occluded proximally.  4.  Circumflex artery:  The circumflex consists of a large circumflex marginal   branch.  The circumflex marginal branch looks angiographically normal.    POST-STENT IMPLANTATION of the proximal mid left anterior descending artery   with overlapping drug-eluting Xience stents 3.0x38 mm and 3.0x8 mm postdilated   to 3.6 mm, demonstrates good stent expansion, 0% residual stenosis, no   evidence of dissection or thrombus, and LOUIE 3 antegrade flow.  The  first diagonal branch is jailed with LOUIE-2 antegrade flow.    PLAN:  Maximize optimal medical therapy for the patient's coronary artery   disease post myocardial infarction post PCI.        ____________________________________     MD DILIA DE LEÓN / WYATT    DD:  08/15/2018 17:38:58  DT:  08/15/2018 18:14:24    D#:  [id number]  Job#:  [id number]

## 2018-08-16 NOTE — PROGRESS NOTES
2 RN skin check complete with Ann ALICEA RN. Skin intact except for right radial site access for cath lab. Chlorhexidine bath completed

## 2018-08-16 NOTE — PROGRESS NOTES
Late entry.  Pt arrived to unit from cath lab post stent placement.  He is on a zoll and portable oxygen for transport.  On arrival, his vital signs stable and on 3l nasal cannula.  He denies chest pain and pulses are palpable for both uppers and lowers.  He has a TR band to R. Wrist and site has small residual blood with no active bleeding.  Pt is alert and oriented and has family at bedside.  Pt on a franklin gtt for blood pressure support and will be titrated off as appropriate.  Pt in SR on monitor with expected ecg changes.  Pt is calm and no signs or symptoms of distress noted.

## 2018-08-17 VITALS
BODY MASS INDEX: 27.79 KG/M2 | TEMPERATURE: 98.9 F | OXYGEN SATURATION: 91 % | RESPIRATION RATE: 20 BRPM | WEIGHT: 209.66 LBS | SYSTOLIC BLOOD PRESSURE: 120 MMHG | HEART RATE: 78 BPM | HEIGHT: 73 IN | DIASTOLIC BLOOD PRESSURE: 70 MMHG

## 2018-08-17 PROBLEM — I25.10 CORONARY ARTERY DISEASE INVOLVING NATIVE CORONARY ARTERY: Status: ACTIVE | Noted: 2018-08-17

## 2018-08-17 PROBLEM — I21.3 STEMI (ST ELEVATION MYOCARDIAL INFARCTION) (HCC): Status: RESOLVED | Noted: 2018-08-15 | Resolved: 2018-08-17

## 2018-08-17 PROBLEM — E87.6 HYPOKALEMIA: Status: RESOLVED | Noted: 2018-08-15 | Resolved: 2018-08-17

## 2018-08-17 PROBLEM — R57.0 CARDIOGENIC SHOCK (HCC): Status: RESOLVED | Noted: 2018-08-15 | Resolved: 2018-08-17

## 2018-08-17 PROBLEM — Z95.820 S/P ANGIOPLASTY WITH STENT: Status: ACTIVE | Noted: 2018-08-17

## 2018-08-17 PROBLEM — I46.9 CARDIAC ARREST (HCC): Status: RESOLVED | Noted: 2018-08-15 | Resolved: 2018-08-17

## 2018-08-17 LAB
LV EJECT FRACT  99904: 45
LV EJECT FRACT MOD 2C 99903: 48.39
LV EJECT FRACT MOD 4C 99902: 48.31
LV EJECT FRACT MOD BP 99901: 47.38

## 2018-08-17 PROCEDURE — 700102 HCHG RX REV CODE 250 W/ 637 OVERRIDE(OP): Performed by: INTERNAL MEDICINE

## 2018-08-17 PROCEDURE — 99239 HOSP IP/OBS DSCHRG MGMT >30: CPT | Performed by: HOSPITALIST

## 2018-08-17 PROCEDURE — A9270 NON-COVERED ITEM OR SERVICE: HCPCS | Performed by: INTERNAL MEDICINE

## 2018-08-17 PROCEDURE — 93306 TTE W/DOPPLER COMPLETE: CPT

## 2018-08-17 RX ORDER — LISINOPRIL 5 MG/1
5 TABLET ORAL DAILY
Qty: 30 TAB | Refills: 3 | Status: SHIPPED | OUTPATIENT
Start: 2018-08-18 | End: 2018-08-20

## 2018-08-17 RX ORDER — ASPIRIN 81 MG/1
81 TABLET ORAL DAILY
Qty: 30 TAB | Refills: 3 | Status: ON HOLD | OUTPATIENT
Start: 2018-08-18 | End: 2021-01-01

## 2018-08-17 RX ORDER — PRASUGREL 10 MG/1
10 TABLET, FILM COATED ORAL DAILY
Qty: 30 TAB | Refills: 3 | Status: SHIPPED | OUTPATIENT
Start: 2018-08-18 | End: 2018-11-19 | Stop reason: SDUPTHER

## 2018-08-17 RX ORDER — ATORVASTATIN CALCIUM 80 MG/1
80 TABLET, FILM COATED ORAL
Qty: 30 TAB | Refills: 3 | Status: SHIPPED | OUTPATIENT
Start: 2018-08-17 | End: 2018-12-10 | Stop reason: SDUPTHER

## 2018-08-17 RX ADMIN — LISINOPRIL 5 MG: 5 TABLET ORAL at 05:50

## 2018-08-17 RX ADMIN — PRASUGREL 10 MG: 10 TABLET, FILM COATED ORAL at 05:50

## 2018-08-17 RX ADMIN — ASPIRIN 81 MG: 81 TABLET, DELAYED RELEASE ORAL at 05:50

## 2018-08-17 RX ADMIN — METOPROLOL TARTRATE 25 MG: 25 TABLET, FILM COATED ORAL at 05:51

## 2018-08-17 ASSESSMENT — PAIN SCALES - GENERAL
PAINLEVEL_OUTOF10: 0

## 2018-08-17 ASSESSMENT — ENCOUNTER SYMPTOMS
NEUROLOGICAL NEGATIVE: 1
FEVER: 0

## 2018-08-17 NOTE — DISCHARGE INSTRUCTIONS
Discharge Instructions    Discharged to home by car with relative. Discharged via wheelchair, hospital escort: Yes.  Special equipment needed: Not Applicable    Be sure to schedule a follow-up appointment with your primary care doctor or any specialists as instructed.     Discharge Plan:   Diet Plan: Discussed  Activity Level: Discussed  Confirmed Follow up Appointment: Patient to Call and Schedule Appointment  Confirmed Symptoms Management: Discussed  Medication Reconciliation Updated: Yes  Pneumococcal Vaccine Administered/Refused: Not given - Patient refused pneumococcal vaccine  Influenza Vaccine Indication: Patient Refuses    I understand that a diet low in cholesterol, fat, and sodium is recommended for good health. Unless I have been given specific instructions below for another diet, I accept this instruction as my diet prescription.   Other diet: Cardiac (low sodium)    Special Instructions: Diagnosis:  Acute Coronary Syndrome (ACS) is a diagnosis that encompasses cardiac-related chest pain and heart attack. ACS occurs when the blood flow to the heart muscle is severely reduced or cut off completely due to a slow process called atherosclerosis.  Atherosclerosis is a disease in which the coronary arteries become narrow from a buildup of fat, cholesterol, and other substances that combine to form plaque. If the plaque breaks, a blood clot will form and block the blood flow to the heart muscle. This lack of blood flow can cause damage or death to the heart muscle which is called a heart attack or Myocardial Infarction (MI). There are two different types of MIs:  ST Elevation Myocardial Infarction or STEMI (the most severe type of heart attack) and Non-ST Elevation Myocardial Infarction or NSTEMI.    Treatment Plan:  · Cardiac Diet  - Low fat, low salt, low cholesterol   · Cardiac Rehab  - Your doctor has ordered you a referral to [facility].  Call [phone] to schedule an appointment.  · Attend my follow-up  appointment with my Cardiologist.  · Take my medications as prescribed by my doctor  · Exercise daily  · Lower my bad cholesterol and raise my good cholesterol, lower my blood pressure and Reduce stress    Medications:  Certain medications are used to treat ACS.  Remember to always take medications as prescribed and never stop talking medications unless told by your doctor.    You have been prescribed the following medicatons:    Aspirin - Aspirin is used as a blood thinning medication and you will require this medication indefinitely.  Beta-Blocker - Beta-Blocker Metoprolol is used to lower blood pressure and heart rate, and/or helps your heart heal after a heart attack.  Statin - Statin Atorvastatin is used to lower cholesterol.  Angiotensin Converting Enzyme (ACE) Inhibitor - Angiotensin Converting Enzyme Inhibitor Lisinopril is used to lower blood pressure and treat heart failure.    · Is patient discharged on Warfarin / Coumadin?   No           Depression / Suicide Risk    As you are discharged from this [facility] facility, it is important to learn how to keep safe from harming yourself.    Recognize the warning signs:  · Abrupt changes in personality, positive or negative- including increase in energy   · Giving away possessions  · Change in eating patterns- significant weight changes-  positive or negative  · Change in sleeping patterns- unable to sleep or sleeping all the time   · Unwillingness or inability to communicate  · Depression  · Unusual sadness, discouragement and loneliness  · Talk of wanting to die  · Neglect of personal appearance   · Rebelliousness- reckless behavior  · Withdrawal from people/activities they love  · Confusion- inability to concentrate     If you or a loved one observes any of these behaviors or has concerns about self-harm, here's what you can do:  · Talk about it- your feelings and reasons for harming yourself  · Remove any means that you might use to hurt yourself  (examples: pills, rope, extension cords, firearm)  · Get professional help from the community (Mental Health, Substance Abuse, psychological counseling)  · Do not be alone:Call your Safe Contact- someone whom you trust who will be there for you.  · Call your local CRISIS HOTLINE 580-4710 or 174-500-4943  · Call your local Children's Mobile Crisis Response Team [place] (070) 670-5336 or www.PLAYSTUDIOS  · Call the toll free National Suicide Prevention Hotlines   · National Suicide Prevention Lifeline 681-200-LHVR (8829)  · National Hope Line Network 800-SUICIDE (993-1133)

## 2018-08-17 NOTE — DIETARY
Nutrition Services: Consult cardiac rehab diet education    Pt is a 73 y.o. Male with Dx: ACS    Admit day 2.  S/p cardiac cath with 2 stents placed 8/15.  H/o DM, HTN, HLD  BMI= 27.66  HA1c 6.3% (was 7% in 2016); lipids WNL.  Attempted diet education x2, but pt providers in room.   Left diet handouts outside of room 8/16.    RD will F/u for diet education as able prior to D/c.

## 2018-08-17 NOTE — PROGRESS NOTES
Cardiology Progress Note               Author: Omero Allen Date & Time created: 2018  9:37 AM     Interval History:  Chief complaint: Chest pain.  Admitted on the  with chest pain and evidence of a STEMI.  He was cardioverted once at [facility] for torsade de pointes.  Angiography here revealed an occluded LAD that was stented successfully.  Overnight he has had no chest pain or dyspnea.  No VT on the monitor.    Review of Systems   Constitutional: Negative for fever.   Neurological: Negative.    Endo/Heme/Allergies: Negative.        Physical Exam   Constitutional: He is oriented to person, place, and time. He appears well-developed and well-nourished. No distress.   HENT:   Head: Atraumatic.   Eyes: Pupils are equal, round, and reactive to light. Conjunctivae and EOM are normal.   Neck: Neck supple. No JVD present.   Cardiovascular: Normal rate and regular rhythm.    No murmur heard.  Pulmonary/Chest: Effort normal and breath sounds normal. No respiratory distress. He has no wheezes. He has no rales.   Musculoskeletal: He exhibits no edema.   Neurological: He is alert and oriented to person, place, and time.   Skin: Skin is warm and dry. He is not diaphoretic.   Psychiatric: He has a normal mood and affect.       Hemodynamics:  Temp (24hrs), Av.2 °C (99 °F), Min:36.8 °C (98.2 °F), Max:37.6 °C (99.7 °F)  Temperature: 37.2 °C (98.9 °F)  Pulse  Av  Min: 65  Max: 78Heart Rate (Monitored): 78  NIBP: 137/77     Respiratory:    Respiration: 20, Pulse Oximetry: 91 %        RUL Breath Sounds: Diminished, RML Breath Sounds: Diminished, RLL Breath Sounds: Clear, LELAND Breath Sounds: Clear, LLL Breath Sounds: Clear  Fluids:     Weight: 95.1 kg (209 lb 10.5 oz)  GI/Nutrition:  Orders Placed This Encounter   Procedures   • Diet Order Cardiac     Standing Status:   Standing     Number of Occurrences:   1     Order Specific Question:   Diet:     Answer:   Cardiac [6]     Lab Results:  Recent Labs       08/15/18   1121  08/16/18   0415   WBC  10.8  12.4*   RBC  5.00  4.47*   HEMOGLOBIN  15.4  13.7*   HEMATOCRIT  45.2  41.3*   MCV  90.4  92.4   MCH  30.8  30.6   MCHC  34.1  33.2*   RDW  42.1  44.2   PLATELETCT  232  192   MPV  9.5  10.1     Recent Labs      08/15/18   1121  08/16/18   0415   SODIUM  137  139   POTASSIUM  3.5*  3.7   CHLORIDE  101  104   CO2  25  26   GLUCOSE  162*  138*   BUN  17  13   CREATININE  0.90  0.65   CALCIUM  10.4*  8.6     Recent Labs      08/15/18   1121   APTT  29.9   INR  0.99     Recent Labs      08/15/18   1121   BNPBTYPENAT  68     Recent Labs      08/15/18   1121  08/16/18   1130   TROPONINI  0.14*  20.33*   BNPBTYPENAT  68   --      Recent Labs      08/16/18   0415   TRIGLYCERIDE  101   HDL  55   LDL  51         Medical Decision Making, by Problem:  Active Hospital Problems    Diagnosis   • *STEMI (ST elevation myocardial infarction) ([facility]) [I21.3]   • Cardiogenic shock ([facility]) [R57.0]   • Cardiac arrest ([facility]) [I46.9]   • Hypokalemia [E87.6]   • Hypertension [I10]   • Diabetes mellitus type 2, controlled ([facility]) [E11.9]   • Dyslipidemia [E78.5]       Plan:  Status post STEMI: Patient is doing well no heart failure on exam.  Echo is pending.  Medications reviewed.  Anticipate discharge later today pending echo results.  Medications reviewed.  Quality-Core Measures

## 2018-08-17 NOTE — DISCHARGE SUMMARY
Discharge Summary    CHIEF COMPLAINT ON ADMISSION  No chief complaint on file.      Reason for Admission  stemi     Admission Date  8/15/2018    CODE STATUS  Full Code    HPI & HOSPITAL COURSE  This is a 73 y.o. male here with findings of ST elevated myocardial infarction on arrival. He was transferred and went straight to cardiac catheterization. Received coronary stent implantation in the mid left anterior descending artery with overlapping drug-eluting stent via right radial artery approach. His echocardiogram showed an ejection fraction of 45% with anterior apical and inferoapical wall motion abnormalities. Patient did well post catheterization with resolve of any pain. Patient was given instructions on medical adherence as well as post myocardial infarction activity exercises and limitations. Patient had a history of GI bleeds and addressed with him the importance of adherence on dual antiplatelet therapy. This too mediately contact this cardiology office if signs of not tolerating dual antiplatelet therapy.       Therefore, he is discharged in good and stable condition to home with close outpatient follow-up.    The patient met 2-midnight criteria for an inpatient stay at the time of discharge.    Discharge Date  8/17/2018    FOLLOW UP ITEMS POST DISCHARGE  Dr. Omero Allen, [facility]  His primary care provider in the next 2-3 weeks    DISCHARGE DIAGNOSES  Principal Problem (Resolved):    STEMI (ST elevation myocardial infarction) (HCC) POA: Yes  Active Problems:    Hypertension (Chronic) POA: Yes      Overview: 8/08 urine mac 11      9/09 urine mac 14      11/11 on lotrel 5/20 mg and diazide      1/20/14 urine mac 8, on lotrel 5/20 mg and diazide      2/28/17 urine mac 8 on lotrel 5/20 mg and diazide    Dyslipidemia (Chronic) POA: Yes      Overview: 8/08 chol 172,trig 49,hdl 64,ldl 98      9/09 chol 192,trig 98,hdl 59,ldl 113      10/10 chol 164,trig 76,hdl 50,ldl 99 lipitor 20 mg      11/12 chol  176,trig 99,hdl 55,ldl 101; cpk 74,crp 7.8 on lipitor 20 mg      9/20/13 chol 150,trig 92,hdl 42,ldl 90 on lipitor 20 mg      9/24/14 chol 161,trig 90,hdl 43,ldl 100 on lipitor 20 mg      8/8/15 chol 167,trig 86,hdl 44,ldl 106 on lipitor 20 mg      10/18/16 chol 164,trig 107,hdl 41,ldl 102 on lipitor 20 mg    Diabetes mellitus type 2, controlled (HCC) (Chronic) POA: Yes      Overview: 8/08 bs 129      9/09 bs 143,A1c 7%, declines more diabetic education like to hold off on       checking blood sugars for now recheck labs of december if still elevated       start metformin      3/10 A1c 6.2%      10/10 A1c 6.4%,bs 129      11/11 A1c 6.2% no meds      11/12 A1c 6.6%,bs 147 no meds      9/20/13 A1c 7.0 %,bs 118 diet controlled      11/15/13 declines to check blood sugar, declines glucometer, declines       diabetic education.      1/20/14 A1c 6.4% diet controlled      9/25/14 A1c 6.0% diet controlled      8/8/15 A1c 6.7% diet controlled, on lotrel, lipitor, no asa secondary to       previous GI bleed      10/18/16 A1c 7% no meds      11/15/16 diabetic education performed      2/21/17 no asa due to GI bleed      2/21/17 poc retina negative      2/28/17 A1c 6.3%,bs 134 diet controlled                      Coronary artery disease involving native coronary artery POA: Unknown    S/P angioplasty with stent POA: Unknown  Resolved Problems:    Cardiogenic shock (HCC) POA: Yes    Cardiac arrest (HCC) POA: Yes    Hypokalemia POA: Yes      FOLLOW UP  No future appointments.  Jamal Nieves M.D.  [address]  [address]  [place] [address] [phone]    Schedule an appointment as soon as possible for a visit in 3 weeks      Juan Manuel Small M.D.  [address]  [address]  [place] [address] [phone]    Schedule an appointment as soon as possible for a visit in 1 week        MEDICATIONS ON DISCHARGE     Medication List      START taking these medications      Instructions   aspirin 81 MG EC tablet   Take 1 Tab by mouth  every day.  Dose:  81 mg     lisinopril 5 MG Tabs  Commonly known as:  PRINIVIL   Take 1 Tab by mouth every day.  Dose:  5 mg     metoprolol 25 MG Tabs  Commonly known as:  LOPRESSOR   Take 1 Tab by mouth 2 Times a Day.  Dose:  25 mg     prasugrel 10 MG Tabs  Commonly known as:  EFFIENT   Take 1 Tab by mouth every day.  Dose:  10 mg        CHANGE how you take these medications      Instructions   atorvastatin 80 MG tablet  What changed:  · medication strength  · how much to take  · when to take this  Commonly known as:  LIPITOR   Take 1 Tab by mouth every bedtime.  Dose:  80 mg        CONTINUE taking these medications      Instructions   sildenafil citrate 100 MG tablet  Commonly known as:  VIAGRA   Take 1 Tab by mouth 1 time daily as needed for Erectile Dysfunction.  Dose:  100 mg        STOP taking these medications    amlodipine-benazepril 5-20 MG per capsule  Commonly known as:  LOTREL     PROBIOTIC DAILY PO     triamterene-hctz 37.5-25 MG Tabs  Commonly known as:  MAXZIDE-25/DYAZIDE            Allergies  Allergies   Allergen Reactions   • Aspirin Anaphylaxis     INTERNAL BLEEDING   • Augmentin Vomiting   • Clindamycin      Develop c.diff   • Metronidazole      Not sure   • Nsaids Unspecified     GI bleed   • Vancomycin Swelling       DIET  Orders Placed This Encounter   Procedures   • Diet Order Cardiac     Standing Status:   Standing     Number of Occurrences:   1     Order Specific Question:   Diet:     Answer:   Cardiac [6]       ACTIVITY  As tolerated.  Weight bearing as tolerated    CONSULTATIONS  Cardiology    PROCEDURES  Cardiac catheterization 8/15/18    LABORATORY  Lab Results   Component Value Date    SODIUM 139 08/16/2018    POTASSIUM 3.7 08/16/2018    CHLORIDE 104 08/16/2018    CO2 26 08/16/2018    GLUCOSE 138 (H) 08/16/2018    BUN 13 08/16/2018    CREATININE 0.65 08/16/2018    CREATININE 1.0 01/12/2009        Lab Results   Component Value Date    WBC 12.4 (H) 08/16/2018    HEMOGLOBIN 13.7 (L)  08/16/2018    HEMATOCRIT 41.3 (L) 08/16/2018    PLATELETCT 192 08/16/2018        Total time of the discharge process exceeds 31 minutes.

## 2018-08-17 NOTE — CARE PLAN
Problem: Safety  Goal: Will remain free from falls  Pt educated about risk for falls during mobilization. Falls were avoided throughout shift and mobilization.     Problem: Knowledge Deficit  Goal: Knowledge of disease process/condition, treatment plan, diagnostic tests, and medications will improve    Intervention: Explain information regarding disease process/condition, treatment plan, diagnostic tests, and medications and document in education  Pt educated about treatment plan. Pt verbalizes understanding.

## 2018-08-17 NOTE — DISCHARGE PLANNING
Anticipated Discharge Disposition: TBD    Action: Spoke to [facility] CM w/ pt's INS. Pt reports he lives w/ his wife. He is independently driving. He has no DME and/or O2 at home. [facility] [address] is his preferred pharmacy.    Barriers to Discharge: TBD    Plan: f/u with d/c needs

## 2018-08-17 NOTE — PROGRESS NOTES
Bedside report received from KATH Lui. Initial patient assessment performed, chart and eMAR reviewed. See relevant flowsheet for details. Patient updated on plan of care for the day, with all questions answered. Call light and personal belongings are within reach, and bed is in the lowest position.

## 2018-08-17 NOTE — CARE PLAN
Problem: Bowel/Gastric:  Goal: Will not experience complications related to bowel motility    Intervention: Assess baseline bowel pattern  Hypoactive x4  Intervention: Implement interventions to promote bowel evacuation if inadequate bowel movements in past 48 hours  Early ambulation. Senna refused by pt  Intervention: Implement Bowel Protocol, if applicable  Will implement if needed. Not currently needed      Problem: Pain Management  Goal: Pain level will decrease to patient's comfort goal  Implementing plan made by healthcare provider. Pt complains of no pain

## 2018-08-17 NOTE — PROGRESS NOTES
"Spoke with Pharmacist, Aurelio, regarding this morning's dose of scheduled aspirin. Patient has an allergy to aspirin which states \"internal bleeding\" and \"anaphlaxis\". Ok to give per Aurelio because this is a risk vs benefit (regarding the internal bleeding allergy) and that patient has received two doses this stay and has not had an anaphylaxis reaction.   "

## 2018-08-20 ENCOUNTER — PATIENT OUTREACH (OUTPATIENT)
Dept: HEALTH INFORMATION MANAGEMENT | Facility: OTHER | Age: 73
End: 2018-08-20

## 2018-08-20 ENCOUNTER — TELEPHONE (OUTPATIENT)
Dept: CARDIOLOGY | Facility: MEDICAL CENTER | Age: 73
End: 2018-08-20

## 2018-08-20 ENCOUNTER — HOSPITAL ENCOUNTER (OUTPATIENT)
Facility: MEDICAL CENTER | Age: 73
DRG: 813 | End: 2018-08-20
Attending: INTERNAL MEDICINE
Payer: MEDICARE

## 2018-08-20 DIAGNOSIS — R82.998 BROWN-COLORED URINE: ICD-10-CM

## 2018-08-20 LAB
APPEARANCE UR: CLEAR
BACTERIA #/AREA URNS HPF: NEGATIVE /HPF
BILIRUB UR QL STRIP.AUTO: NEGATIVE
COLOR UR: YELLOW
EPI CELLS #/AREA URNS HPF: NEGATIVE /HPF
GLUCOSE UR STRIP.AUTO-MCNC: NEGATIVE MG/DL
HYALINE CASTS #/AREA URNS LPF: ABNORMAL /LPF
KETONES UR STRIP.AUTO-MCNC: NEGATIVE MG/DL
LEUKOCYTE ESTERASE UR QL STRIP.AUTO: ABNORMAL
MICRO URNS: ABNORMAL
NITRITE UR QL STRIP.AUTO: NEGATIVE
PH UR STRIP.AUTO: 6 [PH]
PROT UR QL STRIP: NEGATIVE MG/DL
RBC # URNS HPF: ABNORMAL /HPF
RBC UR QL AUTO: NEGATIVE
SP GR UR STRIP.AUTO: 1.01
UROBILINOGEN UR STRIP.AUTO-MCNC: 0.2 MG/DL
WBC #/AREA URNS HPF: ABNORMAL /HPF

## 2018-08-20 PROCEDURE — 81001 URINALYSIS AUTO W/SCOPE: CPT

## 2018-08-20 RX ORDER — LOSARTAN POTASSIUM 25 MG/1
25 TABLET ORAL DAILY
Qty: 90 TAB | Refills: 3 | Status: SHIPPED | OUTPATIENT
Start: 2018-08-20 | End: 2019-02-01

## 2018-08-20 NOTE — TELEPHONE ENCOUNTER
----- Message from Radha Bocanegra sent at 8/20/2018  8:54 AM PDT -----  Regarding: Problems with dark urine and scratchy throat  RS/Drea    Patient was seen by Dr Allen in consultation on 8/15 in [facility] and was given a prescription for Lisinopril. He's having problems with dark urine and scratchy throat, which is side effects of the medication. He wants a call back at [phone] to discuss.

## 2018-08-20 NOTE — TELEPHONE ENCOUNTER
"Pt. Has had dark \"almost brown\" urine with every void since Sat. He denied pain with urination. He takes Effient 10mg QD and ASA 812mg QD.  He also noted that this am he awoke with \"dry, scratchy cough\" and thinks it may be due to Lisinopril 5mg.  To Dr. Allen.  "

## 2018-08-20 NOTE — TELEPHONE ENCOUNTER
Per Dr. Allen: Get UA. Stop Lisinopril and start Losartan 25mg QD. Increase intake in water.  Called pt. To advise. New RX sent to pharmacy. He will get lab done at outpt. [facility] lab this am.

## 2018-08-21 ENCOUNTER — OFFICE VISIT (OUTPATIENT)
Dept: MEDICAL GROUP | Facility: MEDICAL CENTER | Age: 73
End: 2018-08-21
Payer: MEDICARE

## 2018-08-21 ENCOUNTER — HOSPITAL ENCOUNTER (INPATIENT)
Facility: MEDICAL CENTER | Age: 73
LOS: 3 days | DRG: 813 | End: 2018-08-24
Attending: EMERGENCY MEDICINE | Admitting: INTERNAL MEDICINE
Payer: MEDICARE

## 2018-08-21 ENCOUNTER — TELEPHONE (OUTPATIENT)
Dept: CARDIOLOGY | Facility: MEDICAL CENTER | Age: 73
End: 2018-08-21

## 2018-08-21 VITALS
BODY MASS INDEX: 26.72 KG/M2 | WEIGHT: 201.6 LBS | DIASTOLIC BLOOD PRESSURE: 70 MMHG | TEMPERATURE: 98.2 F | SYSTOLIC BLOOD PRESSURE: 124 MMHG | HEIGHT: 73 IN | OXYGEN SATURATION: 97 % | HEART RATE: 66 BPM

## 2018-08-21 DIAGNOSIS — I25.10 CORONARY ARTERY DISEASE INVOLVING NATIVE CORONARY ARTERY OF NATIVE HEART WITHOUT ANGINA PECTORIS: ICD-10-CM

## 2018-08-21 DIAGNOSIS — K92.1 BLACK STOOLS: ICD-10-CM

## 2018-08-21 DIAGNOSIS — I50.22 CHRONIC SYSTOLIC CONGESTIVE HEART FAILURE (HCC): ICD-10-CM

## 2018-08-21 DIAGNOSIS — S20.412A ABRASION OF LEFT SIDE OF BACK, INITIAL ENCOUNTER: ICD-10-CM

## 2018-08-21 PROBLEM — K92.2 GI BLEED: Status: ACTIVE | Noted: 2018-08-21

## 2018-08-21 LAB
ABO GROUP BLD: NORMAL
ABO GROUP BLD: NORMAL
ALBUMIN SERPL BCP-MCNC: 3.7 G/DL (ref 3.2–4.9)
ALBUMIN/GLOB SERPL: 1.4 G/DL
ALP SERPL-CCNC: 58 U/L (ref 30–99)
ALT SERPL-CCNC: 40 U/L (ref 2–50)
ANION GAP SERPL CALC-SCNC: 8 MMOL/L (ref 0–11.9)
APPEARANCE UR: CLEAR
APTT PPP: 31 SEC (ref 24.7–36)
AST SERPL-CCNC: 19 U/L (ref 12–45)
BACTERIA #/AREA URNS HPF: NEGATIVE /HPF
BASOPHILS # BLD AUTO: 0.4 % (ref 0–1.8)
BASOPHILS # BLD: 0.04 K/UL (ref 0–0.12)
BILIRUB SERPL-MCNC: 0.6 MG/DL (ref 0.1–1.5)
BILIRUB UR QL STRIP.AUTO: NEGATIVE
BLD GP AB SCN SERPL QL: NORMAL
BUN SERPL-MCNC: 33 MG/DL (ref 8–22)
CALCIUM SERPL-MCNC: 9.2 MG/DL (ref 8.5–10.5)
CFT BLD TEG: 6.2 MIN (ref 5–10)
CHLORIDE SERPL-SCNC: 106 MMOL/L (ref 96–112)
CLOT ANGLE BLD TEG: 70.1 DEGREES (ref 53–72)
CO2 SERPL-SCNC: 23 MMOL/L (ref 20–33)
COLOR UR: YELLOW
CREAT SERPL-MCNC: 0.74 MG/DL (ref 0.5–1.4)
CT.EXTRINSIC BLD ROTEM: 1.4 MIN (ref 1–3)
EKG IMPRESSION: NORMAL
EOSINOPHIL # BLD AUTO: 0.2 K/UL (ref 0–0.51)
EOSINOPHIL NFR BLD: 2 % (ref 0–6.9)
EPI CELLS #/AREA URNS HPF: NEGATIVE /HPF
ERYTHROCYTE [DISTWIDTH] IN BLOOD BY AUTOMATED COUNT: 42.6 FL (ref 35.9–50)
GLOBULIN SER CALC-MCNC: 2.6 G/DL (ref 1.9–3.5)
GLUCOSE SERPL-MCNC: 129 MG/DL (ref 65–99)
GLUCOSE UR STRIP.AUTO-MCNC: NEGATIVE MG/DL
HCT VFR BLD AUTO: 36.9 % (ref 42–52)
HGB BLD-MCNC: 12.3 G/DL (ref 14–18)
HGB BLD-MCNC: 12.4 G/DL (ref 14–18)
HYALINE CASTS #/AREA URNS LPF: ABNORMAL /LPF
IMM GRANULOCYTES # BLD AUTO: 0.05 K/UL (ref 0–0.11)
IMM GRANULOCYTES NFR BLD AUTO: 0.5 % (ref 0–0.9)
INR PPP: 1.13 (ref 0.87–1.13)
KETONES UR STRIP.AUTO-MCNC: NEGATIVE MG/DL
LEUKOCYTE ESTERASE UR QL STRIP.AUTO: ABNORMAL
LYMPHOCYTES # BLD AUTO: 1.92 K/UL (ref 1–4.8)
LYMPHOCYTES NFR BLD: 19 % (ref 22–41)
MCF BLD TEG: 70.7 MM (ref 50–70)
MCH RBC QN AUTO: 31.2 PG (ref 27–33)
MCHC RBC AUTO-ENTMCNC: 33.6 G/DL (ref 33.7–35.3)
MCV RBC AUTO: 92.9 FL (ref 81.4–97.8)
MICRO URNS: ABNORMAL
MONOCYTES # BLD AUTO: 0.94 K/UL (ref 0–0.85)
MONOCYTES NFR BLD AUTO: 9.3 % (ref 0–13.4)
NEUTROPHILS # BLD AUTO: 6.96 K/UL (ref 1.82–7.42)
NEUTROPHILS NFR BLD: 68.8 % (ref 44–72)
NITRITE UR QL STRIP.AUTO: NEGATIVE
NRBC # BLD AUTO: 0 K/UL
NRBC BLD-RTO: 0 /100 WBC
PA AA BLD-ACNC: 85.2 %
PA ADP BLD-ACNC: 42 %
PH UR STRIP.AUTO: 5 [PH]
PLATELET # BLD AUTO: 221 K/UL (ref 164–446)
PMV BLD AUTO: 10.4 FL (ref 9–12.9)
POTASSIUM SERPL-SCNC: 4 MMOL/L (ref 3.6–5.5)
PROT SERPL-MCNC: 6.3 G/DL (ref 6–8.2)
PROT UR QL STRIP: 30 MG/DL
PROTHROMBIN TIME: 14.2 SEC (ref 12–14.6)
RBC # BLD AUTO: 3.97 M/UL (ref 4.7–6.1)
RBC # URNS HPF: ABNORMAL /HPF
RBC UR QL AUTO: NEGATIVE
RH BLD: NORMAL
RH BLD: NORMAL
SODIUM SERPL-SCNC: 137 MMOL/L (ref 135–145)
SP GR UR STRIP.AUTO: 1.03
TEG ALGORITHM TGALG: ABNORMAL
TROPONIN I SERPL-MCNC: 0.49 NG/ML (ref 0–0.04)
TROPONIN I SERPL-MCNC: 0.51 NG/ML (ref 0–0.04)
UROBILINOGEN UR STRIP.AUTO-MCNC: 1 MG/DL
WBC # BLD AUTO: 10.1 K/UL (ref 4.8–10.8)
WBC #/AREA URNS HPF: ABNORMAL /HPF

## 2018-08-21 PROCEDURE — 99223 1ST HOSP IP/OBS HIGH 75: CPT | Mod: AI | Performed by: INTERNAL MEDICINE

## 2018-08-21 PROCEDURE — 700111 HCHG RX REV CODE 636 W/ 250 OVERRIDE (IP): Performed by: EMERGENCY MEDICINE

## 2018-08-21 PROCEDURE — 85384 FIBRINOGEN ACTIVITY: CPT

## 2018-08-21 PROCEDURE — 99285 EMERGENCY DEPT VISIT HI MDM: CPT

## 2018-08-21 PROCEDURE — 86901 BLOOD TYPING SEROLOGIC RH(D): CPT

## 2018-08-21 PROCEDURE — 85610 PROTHROMBIN TIME: CPT

## 2018-08-21 PROCEDURE — 700105 HCHG RX REV CODE 258: Performed by: EMERGENCY MEDICINE

## 2018-08-21 PROCEDURE — 96375 TX/PRO/DX INJ NEW DRUG ADDON: CPT

## 2018-08-21 PROCEDURE — 700105 HCHG RX REV CODE 258: Performed by: INTERNAL MEDICINE

## 2018-08-21 PROCEDURE — 81001 URINALYSIS AUTO W/SCOPE: CPT

## 2018-08-21 PROCEDURE — A9270 NON-COVERED ITEM OR SERVICE: HCPCS | Performed by: INTERNAL MEDICINE

## 2018-08-21 PROCEDURE — 85576 BLOOD PLATELET AGGREGATION: CPT | Mod: 91

## 2018-08-21 PROCEDURE — 96366 THER/PROPH/DIAG IV INF ADDON: CPT

## 2018-08-21 PROCEDURE — 84484 ASSAY OF TROPONIN QUANT: CPT

## 2018-08-21 PROCEDURE — 85018 HEMOGLOBIN: CPT

## 2018-08-21 PROCEDURE — 36415 COLL VENOUS BLD VENIPUNCTURE: CPT

## 2018-08-21 PROCEDURE — 80053 COMPREHEN METABOLIC PANEL: CPT

## 2018-08-21 PROCEDURE — 85025 COMPLETE CBC W/AUTO DIFF WBC: CPT

## 2018-08-21 PROCEDURE — 770020 HCHG ROOM/CARE - TELE (206)

## 2018-08-21 PROCEDURE — 86850 RBC ANTIBODY SCREEN: CPT

## 2018-08-21 PROCEDURE — C9113 INJ PANTOPRAZOLE SODIUM, VIA: HCPCS | Performed by: EMERGENCY MEDICINE

## 2018-08-21 PROCEDURE — 82272 OCCULT BLD FECES 1-3 TESTS: CPT

## 2018-08-21 PROCEDURE — 96365 THER/PROPH/DIAG IV INF INIT: CPT

## 2018-08-21 PROCEDURE — 99214 OFFICE O/P EST MOD 30 MIN: CPT | Performed by: FAMILY MEDICINE

## 2018-08-21 PROCEDURE — 93005 ELECTROCARDIOGRAM TRACING: CPT | Performed by: EMERGENCY MEDICINE

## 2018-08-21 PROCEDURE — 700102 HCHG RX REV CODE 250 W/ 637 OVERRIDE(OP): Performed by: INTERNAL MEDICINE

## 2018-08-21 PROCEDURE — 85730 THROMBOPLASTIN TIME PARTIAL: CPT

## 2018-08-21 PROCEDURE — 85347 COAGULATION TIME ACTIVATED: CPT

## 2018-08-21 PROCEDURE — 86900 BLOOD TYPING SEROLOGIC ABO: CPT

## 2018-08-21 RX ORDER — AMOXICILLIN 250 MG
2 CAPSULE ORAL 2 TIMES DAILY
Status: DISCONTINUED | OUTPATIENT
Start: 2018-08-21 | End: 2018-08-24 | Stop reason: HOSPADM

## 2018-08-21 RX ORDER — MUPIROCIN CALCIUM 20 MG/G
1 CREAM TOPICAL 2 TIMES DAILY
Qty: 1 TUBE | Refills: 0 | Status: SHIPPED | OUTPATIENT
Start: 2018-08-21 | End: 2018-08-21

## 2018-08-21 RX ORDER — SODIUM CHLORIDE 9 MG/ML
INJECTION, SOLUTION INTRAVENOUS CONTINUOUS
Status: DISCONTINUED | OUTPATIENT
Start: 2018-08-21 | End: 2018-08-23

## 2018-08-21 RX ORDER — ONDANSETRON 4 MG/1
4 TABLET, ORALLY DISINTEGRATING ORAL EVERY 4 HOURS PRN
Status: DISCONTINUED | OUTPATIENT
Start: 2018-08-21 | End: 2018-08-24 | Stop reason: HOSPADM

## 2018-08-21 RX ORDER — PRASUGREL 10 MG/1
10 TABLET, FILM COATED ORAL DAILY
Status: DISCONTINUED | OUTPATIENT
Start: 2018-08-21 | End: 2018-08-24 | Stop reason: HOSPADM

## 2018-08-21 RX ORDER — BISACODYL 10 MG
10 SUPPOSITORY, RECTAL RECTAL
Status: DISCONTINUED | OUTPATIENT
Start: 2018-08-21 | End: 2018-08-24 | Stop reason: HOSPADM

## 2018-08-21 RX ORDER — PANTOPRAZOLE SODIUM 40 MG/10ML
80 INJECTION, POWDER, LYOPHILIZED, FOR SOLUTION INTRAVENOUS ONCE
Status: COMPLETED | OUTPATIENT
Start: 2018-08-21 | End: 2018-08-21

## 2018-08-21 RX ORDER — ACETAMINOPHEN 325 MG/1
650 TABLET ORAL EVERY 6 HOURS PRN
Status: DISCONTINUED | OUTPATIENT
Start: 2018-08-21 | End: 2018-08-24 | Stop reason: HOSPADM

## 2018-08-21 RX ORDER — OMEPRAZOLE 40 MG/1
40 CAPSULE, DELAYED RELEASE ORAL 2 TIMES DAILY
Qty: 60 CAP | Refills: 2 | Status: SHIPPED | OUTPATIENT
Start: 2018-08-21 | End: 2018-08-21

## 2018-08-21 RX ORDER — ATORVASTATIN CALCIUM 80 MG/1
80 TABLET, FILM COATED ORAL
Status: DISCONTINUED | OUTPATIENT
Start: 2018-08-21 | End: 2018-08-24 | Stop reason: HOSPADM

## 2018-08-21 RX ORDER — LOSARTAN POTASSIUM 25 MG/1
25 TABLET ORAL DAILY
Status: DISCONTINUED | OUTPATIENT
Start: 2018-08-22 | End: 2018-08-24

## 2018-08-21 RX ORDER — POLYETHYLENE GLYCOL 3350 17 G/17G
1 POWDER, FOR SOLUTION ORAL
Status: DISCONTINUED | OUTPATIENT
Start: 2018-08-21 | End: 2018-08-24 | Stop reason: HOSPADM

## 2018-08-21 RX ORDER — ONDANSETRON 2 MG/ML
4 INJECTION INTRAMUSCULAR; INTRAVENOUS EVERY 4 HOURS PRN
Status: DISCONTINUED | OUTPATIENT
Start: 2018-08-21 | End: 2018-08-24 | Stop reason: HOSPADM

## 2018-08-21 RX ADMIN — SODIUM CHLORIDE: 9 INJECTION, SOLUTION INTRAVENOUS at 22:25

## 2018-08-21 RX ADMIN — PRASUGREL 10 MG: 10 TABLET, FILM COATED ORAL at 22:23

## 2018-08-21 RX ADMIN — METOPROLOL TARTRATE 25 MG: 25 TABLET, FILM COATED ORAL at 23:54

## 2018-08-21 RX ADMIN — ATORVASTATIN CALCIUM 80 MG: 80 TABLET, FILM COATED ORAL at 22:20

## 2018-08-21 RX ADMIN — PANTOPRAZOLE SODIUM 80 MG: 40 INJECTION, POWDER, LYOPHILIZED, FOR SOLUTION INTRAVENOUS at 18:00

## 2018-08-21 RX ADMIN — SODIUM CHLORIDE 8 MG/HR: 9 INJECTION, SOLUTION INTRAVENOUS at 18:05

## 2018-08-21 ASSESSMENT — ENCOUNTER SYMPTOMS
FOCAL WEAKNESS: 0
BACK PAIN: 0
NERVOUS/ANXIOUS: 0
BLOOD IN STOOL: 0
HEADACHES: 0
FLANK PAIN: 0
VOMITING: 0
DIZZINESS: 0
DEPRESSION: 0
COUGH: 0
CHILLS: 0
SPEECH CHANGE: 0
CONSTIPATION: 0
MYALGIAS: 0
DOUBLE VISION: 0
BLURRED VISION: 0
MEMORY LOSS: 0
PALPITATIONS: 0
NAUSEA: 0
FEVER: 0
WEAKNESS: 0
ABDOMINAL PAIN: 1
SHORTNESS OF BREATH: 0
SENSORY CHANGE: 0

## 2018-08-21 ASSESSMENT — LIFESTYLE VARIABLES
EVER_SMOKED: YES
DO YOU DRINK ALCOHOL: NO
EVER_SMOKED: YES

## 2018-08-21 ASSESSMENT — COPD QUESTIONNAIRES
DURING THE PAST 4 WEEKS HOW MUCH DID YOU FEEL SHORT OF BREATH: SOME OF THE TIME
DO YOU EVER COUGH UP ANY MUCUS OR PHLEGM?: NO/ONLY WITH OCCASIONAL COLDS OR INFECTIONS
COPD SCREENING SCORE: 5
HAVE YOU SMOKED AT LEAST 100 CIGARETTES IN YOUR ENTIRE LIFE: YES

## 2018-08-21 ASSESSMENT — PAIN SCALES - GENERAL: PAINLEVEL_OUTOF10: 0

## 2018-08-21 NOTE — TELEPHONE ENCOUNTER
To Dr. Wills (pt. Has appointment there this am.)  Left message for pt. To call.        Message   Received: Today   Message Contents   Sil Jacobo M.D.  Drea Farrell L.P.N.   Caller: Unspecified (Today,  8:53 AM)             It sounds like some hematuria. He needs to FU with PCP or urgent care ASAP for treatment of UTI if appropriate and evaluation of hematuria.  Sometimes the effient and ASA unmask urinary bleeding that is not serious but needs to be evaluated by a Urologist.  He should not stop effient or ASA unless the bleeding is so severe he is hospitalized for it.  The amount of bleeding does not sound serious although it is unnerving.  Please notify pt.   LS

## 2018-08-21 NOTE — PROGRESS NOTES
Subjective:   Vinod Conrad is a 73 y.o. male here today for coronary artery disease    Abrasion of left side of back  Patient was in the hospital when moving from one bed to another he suffered an abrasion on the left upper back that has been slowly healing but recently started to bleed.    Black stools  Patient has a remote history of GI bleed.  He was started on Effient and taking aspirin after stent placement.  Patient has been complaining of black stools today.  He placed a call in to the cardiologist this morning and is waiting for recommendations.  He denies any abdominal pain, dizziness, shortness of breath or chest pain.    Chronic systolic congestive heart failure (HCC)  Patient had a recent MI which caused apical and distal septal akinesis and reduced heart function to 45%.  He is able to ride his bicycle without any chest pain or shortness of breath.    Coronary artery disease involving native coronary artery  6 days ago, patient was having bilateral arm pain and wife convinced him to go to the hospital.  He was found to have ST elevation MI and became nonresponsive in the emergency department.  He required chest compressions, which quickly revived him.  He was rushed to the Cath Lab and had a stent placed.  He has been on Effient and aspirin daily.         Current medicines (including changes today)  Current Outpatient Prescriptions   Medication Sig Dispense Refill   • omeprazole (PRILOSEC) 40 MG delayed-release capsule Take 1 Cap by mouth 2 times a day. 60 Cap 2   • mupirocin calcium (BACTROBAN) 2 % Cream Apply 1 Application to affected area(s) 2 times a day. Left side of back 1 Tube 0   • losartan (COZAAR) 25 MG Tab Take 1 Tab by mouth every day. 90 Tab 3   • aspirin EC 81 MG EC tablet Take 1 Tab by mouth every day. 30 Tab 3   • atorvastatin (LIPITOR) 80 MG tablet Take 1 Tab by mouth every bedtime. 30 Tab 3   • metoprolol (LOPRESSOR) 25 MG Tab Take 1 Tab by mouth 2 Times a Day. 60 Tab 3   •  "prasugrel (EFFIENT) 10 MG Tab Take 1 Tab by mouth every day. 30 Tab 3   • sildenafil citrate (VIAGRA) 100 MG tablet Take 1 Tab by mouth 1 time daily as needed for Erectile Dysfunction. 30 Tab 1     No current facility-administered medications for this visit.      He  has a past medical history of Allergy; Anesthesia (2006); Arthritis; CATARACT; Hyperlipidemia; Hypertension; Infectious disease (2011); Pain; and Unspecified hemorrhagic conditions (2006). He also has no past medical history of ASTHMA.    ROS   No shortness of breath, no chest pain, no arm pain       Objective:     Blood pressure 124/70, pulse 66, temperature 36.8 °C (98.2 °F), height 1.854 m (6' 1\"), weight 91.4 kg (201 lb 9.6 oz), SpO2 97 %. Body mass index is 26.6 kg/m².   Physical Exam:  Constitutional: Alert, no distress.  Skin: Warm, dry, good turgor, left upper back with superficial abrasion with mild surrounding erythema  Eye: Equal, round and reactive, conjunctiva clear, lids normal.  Psych: Alert and oriented x3, normal affect and mood.        Assessment and Plan:   The following treatment plan was discussed    1. Coronary artery disease involving native coronary artery of native heart without angina pectoris  Reviewed hospital course and new medications with patient. Patient will be following up with cardiology in the next 2 weeks.    2. Chronic systolic congestive heart failure (HCC)  Advised regular exercise and to continue current medications, including the metoprolol and losartan which he was started on after hospitalization.    3. Black stools  Vital signs stable and asymptomatic currently.  Patient has put in a call to cardiology to see what they recommend.  I have prescribed patient omeprazole 40 mg twice daily while on Effient.    4. Abrasion of left side of back, initial encounter  Prescription for Bactroban.  - mupirocin calcium (BACTROBAN) 2 % Cream; Apply 1 Application to affected area(s) 2 times a day. Left side of back  " Dispense: 1 Tube; Refill: 0      Followup: Return if symptoms worsen or fail to improve.

## 2018-08-21 NOTE — ASSESSMENT & PLAN NOTE
Patient was in the hospital when moving from one bed to another he suffered an abrasion on the left upper back that has been slowly healing but recently started to bleed.

## 2018-08-21 NOTE — ED PROVIDER NOTES
"ED Provider Note    CHIEF COMPLAINT  Chief Complaint   Patient presents with   • Blood in Urine     Pt reports to have blood in urine yesterday/ tarry stool today. pt reports to have new med (prasurgrel) after having stents placed recently   • Melena   • Cramping     mild       HPI  Vinod Conrad is a 73 y.o. male who presents for evaluation of dysuria black tarry stools. The patient recently underwent cardiac catheterization for a near cardiac arrest. He was started on Effient. He does have a prior history of aspirin-induced GI bleed rounded decade ago. He is also on a baby aspirin. He is followed by cardiology. He also sees Dr. Garcia with gastroenterology for colonoscopies. He denies any hematemesis or hematochezia but he reports black stools over the past 12 hours. He went to his PCPs office and they referred him here. He also reports some dark colored urine possibly blood in his urine but also dysuria. No flank pain    REVIEW OF SYSTEMS  See HPI for further details. No high fevers chills night sweats weight loss numbness tingling or weakness All other systems are negative.     PAST MEDICAL HISTORY  Past Medical History:   Diagnosis Date   • Infectious disease 2011    c diff   • Anesthesia 2006    hallucinations post anesthesia, at home   • Unspecified hemorrhagic conditions 2006    \"abd bleeding\"   • Allergy    • Arthritis     all joints   • CATARACT     bilateral IOL   • Hyperlipidemia    • Hypertension    • Pain        FAMILY HISTORY  Noncontributory     SOCIAL HISTORY  Social History     Social History   • Marital status:      Spouse name: N/A   • Number of children: N/A   • Years of education: N/A     Social History Main Topics   • Smoking status: Former Smoker     Packs/day: 2.00     Years: 30.00     Types: Cigarettes     Quit date: 1/1/1998   • Smokeless tobacco: Never Used   • Alcohol use 0.0 oz/week      Comment: 5 per week   • Drug use: No   • Sexual activity: Not on file     Other " Topics Concern   • Not on file     Social History Narrative   • No narrative on file     Former smoker   SURGICAL HISTORY  Past Surgical History:   Procedure Laterality Date   • CERVICAL FORAMINOTOMY  5/20/2013    Performed by Krish Yang M.D. at SURGERY [facility] ORS   • OTHER  2010    torn retina RIGHT EYE   • SCLERAL BUCKLING  1/13/2009    Performed by YVETTE DRAPER at SURGERY SAME DAY [facility] ORS   • OTHER ORTHOPEDIC SURGERY  2005    l knee       CURRENT MEDICATIONS  No current facility-administered medications for this encounter.     Current Outpatient Prescriptions:   •  omeprazole (PRILOSEC) 40 MG delayed-release capsule, Take 1 Cap by mouth 2 times a day., Disp: 60 Cap, Rfl: 2  •  mupirocin calcium (BACTROBAN) 2 % Cream, Apply 1 Application to affected area(s) 2 times a day. Left side of back, Disp: 1 Tube, Rfl: 0  •  losartan (COZAAR) 25 MG Tab, Take 1 Tab by mouth every day., Disp: 90 Tab, Rfl: 3  •  aspirin EC 81 MG EC tablet, Take 1 Tab by mouth every day., Disp: 30 Tab, Rfl: 3  •  atorvastatin (LIPITOR) 80 MG tablet, Take 1 Tab by mouth every bedtime., Disp: 30 Tab, Rfl: 3  •  metoprolol (LOPRESSOR) 25 MG Tab, Take 1 Tab by mouth 2 Times a Day., Disp: 60 Tab, Rfl: 3  •  prasugrel (EFFIENT) 10 MG Tab, Take 1 Tab by mouth every day., Disp: 30 Tab, Rfl: 3  •  sildenafil citrate (VIAGRA) 100 MG tablet, Take 1 Tab by mouth 1 time daily as needed for Erectile Dysfunction., Disp: 30 Tab, Rfl: 1      ALLERGIES  Allergies   Allergen Reactions   • Aspirin Anaphylaxis     INTERNAL BLEEDING   • Augmentin Vomiting   • Clindamycin      Develop c.diff   • Metronidazole      Not sure   • Nsaids Unspecified     GI bleed   • Vancomycin Swelling       PHYSICAL EXAM  VITAL SIGNS: /73   Pulse 64   Temp 36.4 °C (97.6 °F)   Resp 14   Wt 91.9 kg (202 lb 9.6 oz)   SpO2 96%   BMI 26.73 kg/m²  Room air O2: 96    Constitutional: Well developed, Well nourished, No acute distress, Non-toxic appearance.    HENT: Normocephalic, Atraumatic, Bilateral external ears normal, Oropharynx moist, No oral exudates, Nose normal.   Eyes: PERRLA, EOMI, Conjunctiva normal, No discharge.   Neck: Normal range of motion, No tenderness, Supple, No stridor.   Cardiovascular: Normal heart rate, Normal rhythm, No murmurs, No rubs, No gallops.   Thorax & Lungs: Normal breath sounds, No respiratory distress, No wheezing, No chest tenderness.   Abdomen: Bowel sounds normal, Soft, No tenderness, No masses, No pulsatile masses.   Skin: Warm, Dry, No erythema, No rash.   Back: No tenderness, No CVA tenderness.   Genitalia: External genitalia appear normal, dark stool strongly Hemoccult-positive   Extremities: Intact distal pulses, No edema, No tenderness, No cyanosis, No clubbing.   Neurologic: Alert & oriented x 3, Normal motor function, Normal sensory function, No focal deficits noted.   Psychiatric: Affect normal, Judgment normal, Mood normal.     EKG  Interpretation by me, sinus rhythm. ST depression and T-wave inversions across the precordium and lateral leads no evidence of ST segment elevation. This is changed from his prior ST segment elevation last week    RADIOLOGY/PROCEDURES  Results for orders placed or performed during the hospital encounter of 08/21/18   PLATELET MAPPING WITH BASIC TEG   Result Value Ref Range    Reaction Time Initial-R 6.2 5.0 - 10.0 min    Clot Kinetics-K 1.4 1.0 - 3.0 min    Clot Angle-Angle 70.1 53.0 - 72.0 degrees    Maximum Clot Strength-MA 70.7 (H) 50.0 - 70.0 mm    % Inhibition ADP 42.0 %    % Inhibition AA 85.2 %    TEG Algorithm Link Algorithm    CBC WITH DIFFERENTIAL   Result Value Ref Range    WBC 10.1 4.8 - 10.8 K/uL    RBC 3.97 (L) 4.70 - 6.10 M/uL    Hemoglobin 12.4 (L) 14.0 - 18.0 g/dL    Hematocrit 36.9 (L) 42.0 - 52.0 %    MCV 92.9 81.4 - 97.8 fL    MCH 31.2 27.0 - 33.0 pg    MCHC 33.6 (L) 33.7 - 35.3 g/dL    RDW 42.6 35.9 - 50.0 fL    Platelet Count 221 164 - 446 K/uL    MPV 10.4 9.0 - 12.9 fL     Neutrophils-Polys 68.80 44.00 - 72.00 %    Lymphocytes 19.00 (L) 22.00 - 41.00 %    Monocytes 9.30 0.00 - 13.40 %    Eosinophils 2.00 0.00 - 6.90 %    Basophils 0.40 0.00 - 1.80 %    Immature Granulocytes 0.50 0.00 - 0.90 %    Nucleated RBC 0.00 /100 WBC    Neutrophils (Absolute) 6.96 1.82 - 7.42 K/uL    Lymphs (Absolute) 1.92 1.00 - 4.80 K/uL    Monos (Absolute) 0.94 (H) 0.00 - 0.85 K/uL    Eos (Absolute) 0.20 0.00 - 0.51 K/uL    Baso (Absolute) 0.04 0.00 - 0.12 K/uL    Immature Granulocytes (abs) 0.05 0.00 - 0.11 K/uL    NRBC (Absolute) 0.00 K/uL   COMP METABOLIC PANEL   Result Value Ref Range    Sodium 137 135 - 145 mmol/L    Potassium 4.0 3.6 - 5.5 mmol/L    Chloride 106 96 - 112 mmol/L    Co2 23 20 - 33 mmol/L    Anion Gap 8.0 0.0 - 11.9    Glucose 129 (H) 65 - 99 mg/dL    Bun 33 (H) 8 - 22 mg/dL    Creatinine 0.74 0.50 - 1.40 mg/dL    Calcium 9.2 8.5 - 10.5 mg/dL    AST(SGOT) 19 12 - 45 U/L    ALT(SGPT) 40 2 - 50 U/L    Alkaline Phosphatase 58 30 - 99 U/L    Total Bilirubin 0.6 0.1 - 1.5 mg/dL    Albumin 3.7 3.2 - 4.9 g/dL    Total Protein 6.3 6.0 - 8.2 g/dL    Globulin 2.6 1.9 - 3.5 g/dL    A-G Ratio 1.4 g/dL   TROPONIN   Result Value Ref Range    Troponin I 0.51 (H) 0.00 - 0.04 ng/mL   PROTHROMBIN TIME   Result Value Ref Range    PT 14.2 12.0 - 14.6 sec    INR 1.13 0.87 - 1.13   APTT   Result Value Ref Range    APTT 31.0 24.7 - 36.0 sec   URINALYSIS   Result Value Ref Range    Color Yellow     Character Clear     Specific Gravity 1.027 <1.035    Ph 5.0 5.0 - 8.0    Glucose Negative Negative mg/dL    Ketones Negative Negative mg/dL    Protein 30 (A) Negative mg/dL    Bilirubin Negative Negative    Urobilinogen, Urine 1.0 Negative    Nitrite Negative Negative    Leukocyte Esterase Trace (A) Negative    Occult Blood Negative Negative    Micro Urine Req Microscopic    ESTIMATED GFR   Result Value Ref Range    GFR If African American >60 >60 mL/min/1.73 m 2    GFR If Non African American >60 >60 mL/min/1.73 m  2   URINE MICROSCOPIC (W/UA)   Result Value Ref Range    WBC 5-10 (A) /hpf    RBC 5-10 (A) /hpf    Bacteria Negative None /hpf    Epithelial Cells Negative /hpf    Hyaline Cast 0-2 /lpf   EKG (ER)   Result Value Ref Range    Report       [facility] Emergency Dept.    Test Date:  2018  Pt Name:    RAINE ERNST               Department: ER  MRN:        [id number]                      Room:       [facility]  Gender:     Male                         Technician: [id number]  :        1945                   Requested By:LISBETH CHRISTOPHER  Order #:    [id number]                    Reading MD: LISBETH CHRISTOPHER MD    Measurements  Intervals                                Axis  Rate:       59                           P:          75  TX:         172                          QRS:        99  QRSD:       98                           T:          170  QT:         448  QTc:        444    Interpretive Statements  SINUS RHYTHM  SUPRAVENTRICULAR BIGEMINY  RIGHT AXIS DEVIATION  REPOL ABNRM, PROBABLE ISCHEMIA, ANT-LAT LEADS  Compared to ECG 2018 04:43:12  Atrial premature complex(es) now present  Right-axis deviation now present  Possible ischemia now present  Myocardial infarct finding no longer present    Electron ically Signed On 2018 16:41:24 PDT by LISBETH CHRISTOPHER MD          COURSE & MEDICAL DECISION MAKING  Pertinent Labs & Imaging studies reviewed. (See chart for details)  An IV was established. The patient here is Hemoccult-positive and has a hemoglobin drop of around 15 down to 12. I consulted Dr. Holland cardiology. He very clearly indicated that we need to continue antiplatelet agents as he is at imminent risk for stent occlusion if we discontinue it. I consulted  gastric urology and he reluctantly agrees but recommended admission for Protonix bolus and infusion and we will monitor him and transfuse platelets and her blood as needed    FINAL IMPRESSION  1. Upper GI  bleed    Admission         Electronically signed by: Chad Moore, 8/21/2018 1:57 PM

## 2018-08-21 NOTE — ASSESSMENT & PLAN NOTE
6 days ago, patient was having bilateral arm pain and wife convinced him to go to the hospital.  He was found to have ST elevation MI and became nonresponsive in the emergency department.  He required chest compressions, which quickly revived him.  He was rushed to the Cath Lab and had a stent placed.  He has been on Effient and aspirin daily.

## 2018-08-21 NOTE — TELEPHONE ENCOUNTER
"Spoke with pt. He saw Dr. Wills this am because he had 4 black, tarry stools in the past 12 hrs.   Nurse spoke directly with Dr. Wills. Dr. Wills said he RX'd Omeprazole and recommended that pt. Call cardiologist \"to see if any alternative meds can be used\".   Per Dr. Jacobo: pt. Needs ER evaluation. Called pt. To advise. He noted that he hadn't taken his Effient today. He will hold it and let ER drKristel Know.  "

## 2018-08-21 NOTE — ASSESSMENT & PLAN NOTE
Patient has a remote history of GI bleed.  He was started on Effient and taking aspirin after stent placement.  Patient has been complaining of black stools today.  He placed a call in to the cardiologist this morning and is waiting for recommendations.  He denies any abdominal pain, dizziness, shortness of breath or chest pain.

## 2018-08-21 NOTE — ASSESSMENT & PLAN NOTE
Patient had a recent MI which caused apical and distal septal akinesis and reduced heart function to 45%.  He is able to ride his bicycle without any chest pain or shortness of breath.

## 2018-08-21 NOTE — TELEPHONE ENCOUNTER
Pt's. UA/culture is abnormal. He was discharged 8-17-18 post STEMI with stenting. He is taking Effient 10mg and ASA 81mg and called yesterday to report brown urine. (See 8-20-18 note.) He has a remote hx. Of GI bleed.  To Dr. Jacobo, ADD.

## 2018-08-22 ENCOUNTER — TELEPHONE (OUTPATIENT)
Dept: MEDICAL GROUP | Facility: MEDICAL CENTER | Age: 73
End: 2018-08-22

## 2018-08-22 DIAGNOSIS — I25.10 CORONARY ARTERY DISEASE INVOLVING NATIVE CORONARY ARTERY OF NATIVE HEART WITHOUT ANGINA PECTORIS: ICD-10-CM

## 2018-08-22 DIAGNOSIS — I10 ESSENTIAL HYPERTENSION: Chronic | ICD-10-CM

## 2018-08-22 DIAGNOSIS — I25.2 HISTORY OF MI (MYOCARDIAL INFARCTION): ICD-10-CM

## 2018-08-22 PROBLEM — K92.1 BLACK STOOLS: Status: RESOLVED | Noted: 2018-08-21 | Resolved: 2018-08-22

## 2018-08-22 PROBLEM — S20.412A ABRASION OF LEFT SIDE OF BACK: Status: RESOLVED | Noted: 2018-08-21 | Resolved: 2018-08-22

## 2018-08-22 PROBLEM — K92.2 GI BLEED: Status: RESOLVED | Noted: 2018-08-21 | Resolved: 2018-08-22

## 2018-08-22 PROBLEM — I50.22 CHRONIC SYSTOLIC CONGESTIVE HEART FAILURE (HCC): Status: RESOLVED | Noted: 2018-08-21 | Resolved: 2018-08-22

## 2018-08-22 LAB
ANION GAP SERPL CALC-SCNC: 6 MMOL/L (ref 0–11.9)
BASOPHILS # BLD AUTO: 0.5 % (ref 0–1.8)
BASOPHILS # BLD: 0.04 K/UL (ref 0–0.12)
BUN SERPL-MCNC: 23 MG/DL (ref 8–22)
CALCIUM SERPL-MCNC: 8.5 MG/DL (ref 8.5–10.5)
CHLORIDE SERPL-SCNC: 107 MMOL/L (ref 96–112)
CO2 SERPL-SCNC: 23 MMOL/L (ref 20–33)
CREAT SERPL-MCNC: 0.64 MG/DL (ref 0.5–1.4)
EOSINOPHIL # BLD AUTO: 0.29 K/UL (ref 0–0.51)
EOSINOPHIL NFR BLD: 3.4 % (ref 0–6.9)
ERYTHROCYTE [DISTWIDTH] IN BLOOD BY AUTOMATED COUNT: 42.7 FL (ref 35.9–50)
GLUCOSE SERPL-MCNC: 113 MG/DL (ref 65–99)
HCT VFR BLD AUTO: 33.2 % (ref 42–52)
HGB BLD-MCNC: 10.8 G/DL (ref 14–18)
HGB BLD-MCNC: 11.2 G/DL (ref 14–18)
HGB BLD-MCNC: 11.4 G/DL (ref 14–18)
IMM GRANULOCYTES # BLD AUTO: 0.03 K/UL (ref 0–0.11)
IMM GRANULOCYTES NFR BLD AUTO: 0.4 % (ref 0–0.9)
LYMPHOCYTES # BLD AUTO: 2.49 K/UL (ref 1–4.8)
LYMPHOCYTES NFR BLD: 29.5 % (ref 22–41)
MCH RBC QN AUTO: 30.3 PG (ref 27–33)
MCHC RBC AUTO-ENTMCNC: 32.5 G/DL (ref 33.7–35.3)
MCV RBC AUTO: 93 FL (ref 81.4–97.8)
MONOCYTES # BLD AUTO: 0.95 K/UL (ref 0–0.85)
MONOCYTES NFR BLD AUTO: 11.3 % (ref 0–13.4)
NEUTROPHILS # BLD AUTO: 4.64 K/UL (ref 1.82–7.42)
NEUTROPHILS NFR BLD: 54.9 % (ref 44–72)
NRBC # BLD AUTO: 0 K/UL
NRBC BLD-RTO: 0 /100 WBC
PLATELET # BLD AUTO: 191 K/UL (ref 164–446)
PMV BLD AUTO: 10.2 FL (ref 9–12.9)
POTASSIUM SERPL-SCNC: 3.6 MMOL/L (ref 3.6–5.5)
RBC # BLD AUTO: 3.57 M/UL (ref 4.7–6.1)
SODIUM SERPL-SCNC: 136 MMOL/L (ref 135–145)
TROPONIN I SERPL-MCNC: 0.48 NG/ML (ref 0–0.04)
WBC # BLD AUTO: 8.4 K/UL (ref 4.8–10.8)

## 2018-08-22 PROCEDURE — 84484 ASSAY OF TROPONIN QUANT: CPT

## 2018-08-22 PROCEDURE — 85025 COMPLETE CBC W/AUTO DIFF WBC: CPT

## 2018-08-22 PROCEDURE — 85018 HEMOGLOBIN: CPT

## 2018-08-22 PROCEDURE — 700105 HCHG RX REV CODE 258: Performed by: EMERGENCY MEDICINE

## 2018-08-22 PROCEDURE — 770020 HCHG ROOM/CARE - TELE (206)

## 2018-08-22 PROCEDURE — 80048 BASIC METABOLIC PNL TOTAL CA: CPT

## 2018-08-22 PROCEDURE — 700102 HCHG RX REV CODE 250 W/ 637 OVERRIDE(OP): Performed by: INTERNAL MEDICINE

## 2018-08-22 PROCEDURE — 99233 SBSQ HOSP IP/OBS HIGH 50: CPT | Performed by: INTERNAL MEDICINE

## 2018-08-22 PROCEDURE — 36415 COLL VENOUS BLD VENIPUNCTURE: CPT

## 2018-08-22 PROCEDURE — A9270 NON-COVERED ITEM OR SERVICE: HCPCS | Performed by: INTERNAL MEDICINE

## 2018-08-22 PROCEDURE — C9113 INJ PANTOPRAZOLE SODIUM, VIA: HCPCS | Performed by: EMERGENCY MEDICINE

## 2018-08-22 PROCEDURE — 700111 HCHG RX REV CODE 636 W/ 250 OVERRIDE (IP): Performed by: EMERGENCY MEDICINE

## 2018-08-22 PROCEDURE — 700105 HCHG RX REV CODE 258: Performed by: INTERNAL MEDICINE

## 2018-08-22 RX ADMIN — SODIUM CHLORIDE 8 MG/HR: 9 INJECTION, SOLUTION INTRAVENOUS at 04:33

## 2018-08-22 RX ADMIN — LOSARTAN POTASSIUM 25 MG: 25 TABLET, FILM COATED ORAL at 05:02

## 2018-08-22 RX ADMIN — SODIUM CHLORIDE: 9 INJECTION, SOLUTION INTRAVENOUS at 11:22

## 2018-08-22 RX ADMIN — PRASUGREL 10 MG: 10 TABLET, FILM COATED ORAL at 05:01

## 2018-08-22 RX ADMIN — METOPROLOL TARTRATE 25 MG: 25 TABLET, FILM COATED ORAL at 05:01

## 2018-08-22 RX ADMIN — ATORVASTATIN CALCIUM 80 MG: 80 TABLET, FILM COATED ORAL at 20:17

## 2018-08-22 RX ADMIN — METOPROLOL TARTRATE 25 MG: 25 TABLET, FILM COATED ORAL at 17:44

## 2018-08-22 RX ADMIN — SODIUM CHLORIDE 8 MG/HR: 9 INJECTION, SOLUTION INTRAVENOUS at 14:40

## 2018-08-22 ASSESSMENT — COPD QUESTIONNAIRES
COPD SCREENING SCORE: 2
HAVE YOU SMOKED AT LEAST 100 CIGARETTES IN YOUR ENTIRE LIFE: NO/DON'T KNOW
DO YOU EVER COUGH UP ANY MUCUS OR PHLEGM?: NO/ONLY WITH OCCASIONAL COLDS OR INFECTIONS
DURING THE PAST 4 WEEKS HOW MUCH DID YOU FEEL SHORT OF BREATH: NONE/LITTLE OF THE TIME
IN THE PAST 12 MONTHS DO YOU DO LESS THAN YOU USED TO BECAUSE OF YOUR BREATHING PROBLEMS: DISAGREE/UNSURE

## 2018-08-22 ASSESSMENT — ENCOUNTER SYMPTOMS
DIARRHEA: 0
CONSTIPATION: 0
CONSTITUTIONAL NEGATIVE: 1
DEPRESSION: 0
SEIZURES: 0
BLURRED VISION: 0
PALPITATIONS: 0
RESPIRATORY NEGATIVE: 1
TREMORS: 0
BLOOD IN STOOL: 1
BACK PAIN: 0
COUGH: 0
ABDOMINAL PAIN: 1
SPUTUM PRODUCTION: 0
HEARTBURN: 0
SHORTNESS OF BREATH: 0
FALLS: 0
WEIGHT LOSS: 0
GASTROINTESTINAL NEGATIVE: 1
MUSCULOSKELETAL NEGATIVE: 1
SPEECH CHANGE: 0
NECK PAIN: 0
EYE PAIN: 0
DIZZINESS: 0
VOMITING: 0
NAUSEA: 0
CARDIOVASCULAR NEGATIVE: 1
FEVER: 0
CHILLS: 0
WHEEZING: 0
PND: 0
WEAKNESS: 0
HEADACHES: 0

## 2018-08-22 ASSESSMENT — PATIENT HEALTH QUESTIONNAIRE - PHQ9
2. FEELING DOWN, DEPRESSED, IRRITABLE, OR HOPELESS: NOT AT ALL
SUM OF ALL RESPONSES TO PHQ9 QUESTIONS 1 AND 2: 0
1. LITTLE INTEREST OR PLEASURE IN DOING THINGS: NOT AT ALL

## 2018-08-22 ASSESSMENT — COGNITIVE AND FUNCTIONAL STATUS - GENERAL
MOBILITY SCORE: 23
DAILY ACTIVITIY SCORE: 23
WALKING IN HOSPITAL ROOM: A LITTLE
DRESSING REGULAR LOWER BODY CLOTHING: A LITTLE
SUGGESTED CMS G CODE MODIFIER DAILY ACTIVITY: CI
SUGGESTED CMS G CODE MODIFIER MOBILITY: CI

## 2018-08-22 ASSESSMENT — PAIN SCALES - GENERAL
PAINLEVEL_OUTOF10: 0

## 2018-08-22 ASSESSMENT — LIFESTYLE VARIABLES
ALCOHOL_USE: NO
SUBSTANCE_ABUSE: 0

## 2018-08-22 NOTE — PROGRESS NOTES
2 RN skin check performed with Deanna RN.    Patient has a scratch on his left scapula area, and a scratch on his upper mid back.     Patient has dryness and cracking on his bilateral heels.    Otherwise skin is normal dry and intact.

## 2018-08-22 NOTE — PROGRESS NOTES
Patient transported to floor from ED. Patient A&Ox4. Oriented to room and surrounding. Patient placed on tele box, monitor room notified. Patient rhythm normal sinus. Patient has no complaints of pain at this time. Labs reviewed. Vitals reviewed. Fall risk assessed, skin check done with two RN's. Patient declines any additional needs at this time. Call light within reach. Fall precautions in place. Will continue with hourly rounding.

## 2018-08-22 NOTE — PROGRESS NOTES
Dr. Garcia into see patient.  Per MD, patient should be NPO at midnight for endoscopy, and AM Effient dose should be held until post procedure

## 2018-08-22 NOTE — DISCHARGE PLANNING
Anticipated Discharge Disposition: Home     Action: LSW met with Pt at bedside, Pt advised he lives with spouse in [place] in 1 story home, Pt report independent with ADLs and provides own transportation, Pt refused to discuss income or resources. No indicated needs for discharge at this time     Barriers to Discharge: none     Plan: Pt to discharge home when medically cleared     Care Transition Team Assessment    Information Source  Information Given By: Patient  Informant's Name: justyna  Who is responsible for making decisions for patient? : Patient    Readmission Evaluation  Is this a readmission?: No    Elopement Risk  Legal Hold: No  Ambulatory or Self Mobile in Wheelchair: Yes  Disoriented: No  Psychiatric Symptoms: None  History of Wandering: No  Elopement this Admit: No  Vocalizing Wanting to Leave: No  Displays Behaviors, Body Language Wanting to Leave: No-Not at Risk for Elopement    Interdisciplinary Discharge Planning  Does Admitting Nurse Feel This Could be a Complex Discharge?: No  Primary Care Physician: Johann  Lives with - Patient's Self Care Capacity: Spouse  Patient or legal guardian wants to designate a caregiver (see row info): No  Support Systems: Family Member(s)  Housing / Facility: [address] [facility]  Do You Take your Prescribed Medications Regularly: Yes  Able to Return to Previous ADL's: No  Mobility Issues: No  Prior Services: None  Patient Expects to be Discharged to:: home  Assistance Needed: No  Durable Medical Equipment: Not Applicable    Discharge Preparedness  What is your plan after discharge?: Home with help  What are your discharge supports?: Spouse  Prior Functional Level: Independent with Activities of Daily Living, Drives Self    Functional Assesment  Prior Functional Level: Independent with Activities of Daily Living, Drives Self    Finances  Financial Barriers to Discharge: No (refused to dicuss income )  Prescription Coverage: Yes    Vision / Hearing Impairment  Vision Impairment :  No  Hearing Impairment : No    Values / Beliefs / Concerns  Values / Beliefs Concerns : No    Advance Directive  Advance Directive?: None  Advance Directive offered?: AD Booklet refused    Domestic Abuse  Have you ever been the victim of abuse or violence?: No  Physical Abuse or Sexual Abuse: No  Verbal Abuse or Emotional Abuse: No  Possible Abuse Reported to:: Not Applicable    Psychological Assessment  History of Substance Abuse: Alcohol  History of Psychiatric Problems: No  Non-compliant with Treatment: No         Anticipated Discharge Information  Anticipated discharge disposition: Home

## 2018-08-22 NOTE — CARE PLAN
Problem: Safety  Goal: Will remain free from falls    Intervention: Implement fall precautions   08/22/18 0202   OTHER   Environmental Precautions Treaded Slipper Socks on Patient;Personal Belongings, Wastebasket, Call Bell etc. in Easy Reach;Transferred to Stronger Side;Communication Sign for Patients & Families;Bed in Low Position;Report Given to Other Health Care Providers Regarding Fall Risk;Mobility Assessed & Appropriate Sign Placed   Bedrails Bedrails Closest to Bathroom Down

## 2018-08-22 NOTE — CONSULTS
DATE OF SERVICE:  08/21/2018    REFERRING PHYSICIAN:  Chad Moore MD    CHIEF COMPLAINT:  Melena.    HISTORY OF PRESENT ILLNESS:  This patient is a 73-year-old white male, who was   recently hospitalized and discharged approximately 1 week ago after he   presented to the emergency room with chest pain.  He then had an episode of   ventricular fibrillation and was cardioverted and required CPR.  He was taken   to the cath lab and had a drug-eluting stent placed and was then placed on an   aspirin a day as well as Effient.  The patient developed over the past 2 days   problems with intermittent episodes of melanotic bowel movements.  He denies   any bright red blood per rectum.  He denies abdominal pain, heartburn,   dysphagia and his weight has been stable.  He also notices that superficial   scratch on his skin was bleeding as well.  He then sought attention in the   emergency room.  He denies any chest pain or shortness of breath.    ALLERGIES:  TO ASPIRIN, AUGMENTIN, CLINDAMYCIN, FLAGYL, NONSTEROIDALS,   VANCOMYCIN.    MEDICATIONS:  He was on Cozaar, aspirin, Lipitor, Lopressor, Effient, Viagra.    PAST MEDICAL HISTORY:  1.  Degenerative joint disease.  2.  Hypertension.  3.  Dyslipidemia.  4.  Retinal detachment.  5.  History of Clostridium difficile.  6.  Recent myocardial infarction with drug-eluting stent placed.  7.  Mild congestive heart failure.  8.  C-spine surgery.    SOCIAL HISTORY:  He gave up smoking about 20 years ago.  Occasionally drinks   alcohol.    FAMILY HISTORY:  Noncontributory.    REVIEW OF SYSTEMS:  Other than his GI symptoms, a 13-point review of systems   is negative.    PHYSICAL EXAMINATION:  VITAL SIGNS:  Blood pressure is 125/82, pulse is 90, respiratory rate is 18,   temperature is afebrile.  SKIN:  No stigmata of chronic liver disease.  HEENT:  Head normocephalic.  Eyes are nonicteric.  NECK:  Supple.  LYMPH NODES:  Negative supraclavicular, cervical or axillary.  HEART:  Regular  rate and rhythm.  LUNGS:  Clear to auscultation and percussion.  ABDOMEN:  Normoactive bowel sounds.  No masses, tenderness or organomegaly.  EXTREMITIES:  Degenerative changes.  NEUROLOGIC:  Normal.  PSYCHIATRIC:  Normal.    LABORATORY DATA:  Hematocrit 36.9, platelet count 221,000, white blood cell   count 10.1.  Sodium 137, potassium 4.0, creatinine 0.74, BUN is elevated at   33.  Liver function tests are normal.  Elevated troponin is 0.51.  INR is   1.13, PTT is 31.    IMPRESSION AND RECOMMENDATIONS:  1.  Melena.  This is compatible with an upper gastrointestinal bleed.  In the   setting of active use of Effient and his active bleeding, we need to hold   this, and in 36-48 hours, would consider an upper endoscopy on him dependent   on his overall clinical course.  We need to let the Effient wore out of the   system to allow us to do therapeutic maneuvers if indicated.  It is good to   see his hematocrit is only mildly decreased.  He will be put on a proton pump   inhibitor and observed.  I am fine with a clear liquid diet.  2.  Anemia.  He has had a slight decrease in his anemia, which could be due to   his gastrointestinal bleeding.  3.  Coronary artery disease with a drug-eluting stent placed recently.  We   need to hold his anticoagulants at present given his active gastrointestinal   bleed and then with cardiology assistance and evaluating his upper   gastrointestinal tract.  We can then decide on the next most prudent course of   action.  Fortunately, his cardiac status at this point in time is stable.  4.  Hypertension for which he is on medication.  5.  Elevated troponin of questionable significance.       ____________________________________     MD MARCIA Jolley / WYATT    DD:  08/21/2018 16:56:45  DT:  08/21/2018 17:33:03    D#:  [id number]  Job#:  [id number]

## 2018-08-22 NOTE — H&P
Hospital Medicine History and Physical    Date of Service  8/21/2018    Chief Complaint  Chief Complaint   Patient presents with   • Blood in Urine     Pt reports to have blood in urine yesterday/ tarry stool today. pt reports to have new med (prasurgrel) after having stents placed recently   • Melena   • Cramping     mild       History of Presenting Illness  73 y.o. male with a past medical history of recent ST elevation MI status post LAD stenting on August 15, 2018 presented 8/21/2018 with melena times 1 day.  Patient was discharged to home on aspirin and Effient for LAD stent.  He does have a history of prior NSAID induced gastritis over 13 years ago.  He did have a colonoscopy completed by Dr. Devine,  2 years ago and was noted to have polyps at that time.  He denies any prior upper endoscopy.    On examination patient denies any associated chest pain, dizziness, lightheadedness, blurry vision.  He reports several episodes of melena this a.m.  He also reports mild bilateral lower quadrant abdominal discomfort.  No associated nausea or vomiting.  Has no other complaints at this time.  He is accompanied by his wife and daughter.    In the emergency room, hemoglobin has dropped from 12-10 as compared to prior levels.  He is otherwise hemodynamically stable.  Per cardiology and GI.  Given recent stent placement, patient will require ongoing Effient use.  Will transfuse platelets and PRBC as needed.  To start Protonix infusion.                  Primary Care Physician  Jamal Nieves M.D.    Consultants  GI  Cardiology    Code Status  Code: Full code    Review of Systems  Review of Systems   Constitutional: Negative for chills and fever.   HENT: Negative for congestion and hearing loss.    Eyes: Negative for blurred vision and double vision.   Respiratory: Negative for cough and shortness of breath.    Cardiovascular: Negative for chest pain, palpitations and leg swelling.   Gastrointestinal: Positive for abdominal  "pain and melena. Negative for blood in stool, constipation, nausea and vomiting.   Genitourinary: Negative for dysuria and flank pain.   Musculoskeletal: Negative for back pain, joint pain and myalgias.   Neurological: Negative for dizziness, sensory change, speech change, focal weakness, weakness and headaches.   Psychiatric/Behavioral: Negative for depression and memory loss. The patient is not nervous/anxious.           Past Medical History  Past Medical History:   Diagnosis Date   • Infectious disease 2011    c diff   • Anesthesia 2006    hallucinations post anesthesia, at home   • Unspecified hemorrhagic conditions 2006    \"abd bleeding\"   • Allergy    • Arthritis     all joints   • CATARACT     bilateral IOL   • Hyperlipidemia    • Hypertension    • Pain        Surgical History  Past Surgical History:   Procedure Laterality Date   • CERVICAL FORAMINOTOMY  5/20/2013    Performed by Krish Yang M.D. at SURGERY [facility] ORS   • OTHER  2010    torn retina RIGHT EYE   • SCLERAL BUCKLING  1/13/2009    Performed by YVETTE DRAPER at SURGERY SAME DAY [facility] ORS   • OTHER ORTHOPEDIC SURGERY  2005    l knee       Medications  No current facility-administered medications on file prior to encounter.      Current Outpatient Prescriptions on File Prior to Encounter   Medication Sig Dispense Refill   • losartan (COZAAR) 25 MG Tab Take 1 Tab by mouth every day. 90 Tab 3   • aspirin EC 81 MG EC tablet Take 1 Tab by mouth every day. 30 Tab 3   • atorvastatin (LIPITOR) 80 MG tablet Take 1 Tab by mouth every bedtime. 30 Tab 3   • metoprolol (LOPRESSOR) 25 MG Tab Take 1 Tab by mouth 2 Times a Day. 60 Tab 3   • prasugrel (EFFIENT) 10 MG Tab Take 1 Tab by mouth every day. 30 Tab 3       Family History  No family history on file.  CAD, hypertension    Social History  Social History   Substance Use Topics   • Smoking status: Former Smoker     Packs/day: 2.00     Years: 30.00     Types: Cigarettes     Quit date: 1/1/1998 "   • Smokeless tobacco: Never Used   • Alcohol use 0.0 oz/week      Comment: 5 per week     Lives with wife    Allergies  Allergies   Allergen Reactions   • Aspirin Anaphylaxis     INTERNAL BLEEDING   • Augmentin Vomiting   • Clindamycin      Develop c.diff   • Metronidazole      Not sure   • Nsaids Unspecified     GI bleed   • Vancomycin Swelling   • Ace Inhibitors Cough        Physical Exam  Laboratory   Hemodynamics  Temp (24hrs), Av.4 °C (97.6 °F), Min:36.4 °C (97.6 °F), Max:36.4 °C (97.6 °F)   Temperature: 36.4 °C (97.6 °F)  Pulse  Av.6  Min: 60  Max: 66 Heart Rate (Monitored): (!) 55  Blood Pressure : 121/73, NIBP: 146/75      Respiratory      Respiration: 18, Pulse Oximetry: 95 %, O2 Daily Delivery Respiratory : Room Air with O2 Available             Physical Exam   Constitutional: He is oriented to person, place, and time. He appears well-nourished. No distress.   HENT:   Head: Normocephalic and atraumatic.   Nose: Nose normal.   Mouth/Throat: No oropharyngeal exudate.   Eyes: Pupils are equal, round, and reactive to light. EOM are normal. Right eye exhibits no discharge. Left eye exhibits no discharge. No scleral icterus.   Neck: Neck supple. No JVD present. No thyromegaly present.   Cardiovascular: Normal rate and intact distal pulses.    No murmur heard.  Pulmonary/Chest: Breath sounds normal. No respiratory distress. He has no wheezes.   Abdominal: Soft. Bowel sounds are normal. He exhibits no distension. There is no tenderness.   Musculoskeletal: He exhibits no edema or tenderness.   Neurological: He is alert and oriented to person, place, and time. No cranial nerve deficit. Coordination normal.   Skin: Skin is warm and dry. No rash noted. He is not diaphoretic. No erythema.   Psychiatric: He has a normal mood and affect. His behavior is normal. Judgment and thought content normal.   Nursing note and vitals reviewed.        Assessment/Plan  * GI bleed   Assessment & Plan    Acute with history of  NSAID induced gastritis  Follows up with GI, Dr. Devine as an outpatient  Now on dual antiplatelet agent, aspirin and Effient due to recent ST elevation MI  Per discussion with cardiology, will need to continue Effient due to high risk for stent thrombosis  We will hold aspirin for now  On Protonix drip  GI to follow-up  Positive fecal occult blood testing  H&H q. 8  Transfuse platelets and PRBC as needed  Currently hemodynamically stable  Okay to continue clear liquid diet  GI to evaluate  Avoid NSAIDs          Coronary artery disease involving native coronary artery- (present on admission)   Assessment & Plan    Status post recent ST elevation MI and LAD stenting  Cardiology consulted  Follow-up further recommendations  Continue Effient  Follow-up troponin        Diabetes mellitus type 2, controlled (HCC)- (present on admission)   Assessment & Plan    Diet controlled  Monitor        Dyslipidemia- (present on admission)   Assessment & Plan    Continue statin            I anticipate this patient will require at least two midnights for appropriate medical management, necessitating inpatient admission.    Prophylaxis: SCDs    Recent Labs      08/21/18   1450   WBC  10.1   RBC  3.97*   HEMOGLOBIN  12.4*   HEMATOCRIT  36.9*   MCV  92.9   MCH  31.2   MCHC  33.6*   RDW  42.6   PLATELETCT  221   MPV  10.4     Recent Labs      08/21/18   1450   SODIUM  137   POTASSIUM  4.0   CHLORIDE  106   CO2  23   GLUCOSE  129*   BUN  33*   CREATININE  0.74   CALCIUM  9.2     Recent Labs      08/21/18   1450   ALTSGPT  40   ASTSGOT  19   ALKPHOSPHAT  58   TBILIRUBIN  0.6   GLUCOSE  129*     Recent Labs      08/21/18   1450   APTT  31.0   INR  1.13             Lab Results   Component Value Date    TROPONINI 0.51 (H) 08/21/2018       Imaging  No orders to display

## 2018-08-22 NOTE — CARE PLAN
Problem: Safety  Goal: Will remain free from injury  Outcome: PROGRESSING AS EXPECTED  Fall precautions in place. Treaded socks on pt. Appropriate signs on doorway. IV pole on same side as bathroom. Bedrails up. Bed in lowest position and locked.  Call light and phone within reach. Patient educated on importance of calling nurses before getting out of bed, verbalizes understanding.     Problem: Knowledge Deficit  Goal: Knowledge of disease process/condition, treatment plan, diagnostic tests, and medications will improve  Outcome: PROGRESSING AS EXPECTED  Patient and spouse educated about POC.  All questions answered in regards to disease process, treatment, and diet.  Patient and spouse verbalized understanding and voiced no further questions at this time.

## 2018-08-22 NOTE — DOCUMENTATION QUERY
"DOCUMENTATION QUERY    PROVIDERS: Please select “Cosign w/ note”to reply to query.    To better represent the severity of illness of your patient, please review the following information and exercise your independent professional judgment in responding to this query.     \"Anemia which could be due to his gastrointestinal bleeding\" is documented in the GI Progress Note. Based upon the clinical findings, risk factors, and treatment, can this diagnosis be further specified?    • Acute blood loss anemia  • Chronic blood loss anemia  • Other type of anemia (please specify type)  • Other explanation of clinical findings  • Unable to determine    The medical record reflects the following:   Clinical Findings 8/21 ED Note: Patient here is Hemoccult-positive and has a hemoglobin drop of around 15 to 12\" is documented.  8/21 Hemoglobin 12.4; Hematocrit: 36.9  8/21 H&P: \"GI bleed\" and \"Effient due to high risk stent\" is documented.  8/21 GI Consult: \"In the setting of active use of Effient and his active bleeding, we need to hold this\" is documented.   8/22 Hemoglobin: 10.8; Hematocrit: 33.2  8/23 Hemoglogin: 10.2; Hematocrit: 30.7   Treatment GI Consult; H&H every 8 hours; Protonix   Risk Factors Age; GI bleed; recent coronary event with stent placement; anticoagulation therapy   Location within medical record History and Physical, Progress Notes, Lab Results and MAR     Thank you,   Terese Nicole RN  Clinical   [phone]          "

## 2018-08-22 NOTE — ED NOTES
Pt aware of POC. PIV initiated, blood drawn and sent to lab. Resting in [facility]. VSS. EKG completed.

## 2018-08-22 NOTE — PROGRESS NOTES
Gastroenterology Progress Note     Author: Lambert Garcia   Date & Time Created: 8/22/2018 9:53 AM    Chief Complaint:  Acute GI bleed anemia - melena    Interval History:  Initial history:  73-year-old white male, who was recently hospitalized and discharged approximately 1 week ago after he presented to the emergency room with chest pain.  He then had an episode of ventricular fibrillation and was cardioverted and required CPR.  He was taken to the cath lab and had a drug-eluting stent placed and was then placed on an aspirin a day as well as Effient.  The patient developed over the past 2 days problems with intermittent episodes of melanotic bowel movements.  He denies any bright red blood per rectum.  He denies abdominal pain, heartburn, dysphagia and his weight has been stable.  He also notices that superficial scratch on his skin was bleeding as well.  He then sought attention in the emergency room.  He denies any chest pain or shortness of breath.    08/22/2018 - No further signs of bleeding.  Hemodynamically stable.  Took Effient today at 5am.  No nausea, vomiting, abdominal pain or other GI symptomatology.    Review of Systems:  Review of Systems   Constitutional: Negative.    HENT: Negative.    Respiratory: Negative.    Cardiovascular: Negative.    Gastrointestinal: Negative.    Genitourinary: Negative.    Musculoskeletal: Negative.    Skin: Negative.        Physical Exam:  Physical Exam   Constitutional: He is oriented to person, place, and time. He appears well-developed and well-nourished.   HENT:   Head: Normocephalic and atraumatic.   Eyes: Conjunctivae are normal.   Neck: Normal range of motion. Neck supple.   Cardiovascular: Normal rate and regular rhythm.    Pulmonary/Chest: Effort normal and breath sounds normal.   Abdominal: Soft. Bowel sounds are normal.   Neurological: He is alert and oriented to person, place, and time.   Skin: Skin is warm and dry.   Psychiatric: He has a normal mood  and affect. His behavior is normal. Thought content normal.   Nursing note and vitals reviewed.      Labs:        Invalid input(s): [id number]  Recent Labs      18   0516   TROPONINI  0.51*  0.49*  0.48*     Recent Labs      18   0158   SODIUM  137  136   POTASSIUM  4.0  3.6   CHLORIDE  106  107   CO2  23  23   BUN  33*  23*   CREATININE  0.74  0.64   CALCIUM  9.2  8.5     Recent Labs      18   015   ALTSGPT  40   --    ASTSGOT  19   --    ALKPHOSPHAT  58   --    TBILIRUBIN  0.6   --    GLUCOSE  129*  113*     Recent Labs      18   0918   RBC  3.97*   --   3.57*   --    HEMOGLOBIN  12.4*  12.3*  10.8*  11.2*   HEMATOCRIT  36.9*   --   33.2*   --    PLATELETCT  221   --   191   --    PROTHROMBTM  14.2   --    --    --    APTT  31.0   --    --    --    INR  1.13   --    --    --      Recent Labs      18   WBC  10.1  8.4   NEUTSPOLYS  68.80  54.90   LYMPHOCYTES  19.00*  29.50   MONOCYTES  9.30  11.30   EOSINOPHILS  2.00  3.40   BASOPHILS  0.40  0.50   ASTSGOT  19   --    ALTSGPT  40   --    ALKPHOSPHAT  58   --    TBILIRUBIN  0.6   --      Hemodynamics:  Temp (24hrs), Av.4 °C (97.6 °F), Min:36.3 °C (97.3 °F), Max:36.9 °C (98.4 °F)  Temperature: 36.9 °C (98.4 °F)  Pulse  Av.3  Min: 60  Max: 85Heart Rate (Monitored): 60  Blood Pressure : 141/82, NIBP: 158/83     Respiratory:    Respiration: 17, Pulse Oximetry: 96 %, O2 Daily Delivery Respiratory : Room Air with O2 Available           Fluids:    Intake/Output Summary (Last 24 hours) at 18 0953  Last data filed at 18 0800   Gross per 24 hour   Intake                0 ml   Output             1100 ml   Net            -1100 ml     Weight: 91.9 kg (202 lb 9.6 oz)  GI/Nutrition:  Orders Placed This Encounter   Procedures   • Diet Order Clear Liquids - No Red Foods, Cardiac      Standing Status:   Standing     Number of Occurrences:   1     Order Specific Question:   Diet:     Answer:   Clear Liquids - No Red Foods [12]     Order Specific Question:   Diet:     Answer:   Cardiac [6]   • DIET NPO     Standing Status:   Standing     Number of Occurrences:   8     Order Specific Question:   Restrict to:     Answer:   Strict [1]     Medical Decision Making, by Problem:  Active Hospital Problems    Diagnosis   • *GI bleed [K92.2]   • Coronary artery disease involving native coronary artery [I25.10]   • Diabetes mellitus type 2, controlled (HCC) [E11.9]   • Dyslipidemia [E78.5]       Impression / Plan:  1.  Melena - Consistent with upper gastrointestinal bleed  2.  Coagulopathy - Effient use due to recent coronary event and coronary stent  3.  Anemia - Slight decrease in his anemia, which could be due to his gastrointestinal bleeding  3.  Coronary artery disease - Drug-eluting stent placed 1 week ago  4.  Hypertension - Stable on medication  5.  Elevated troponin of questionable significance    - Continue to follow CBC  - Transfuse to Hb > 7  - Hold Effient tomorrow morning - recent use increases risk of endoscopy but holding Effient increases risk of coronary stent occlusion  - EGD tomorrow afternoon in OR with anesthesia    Quality-Core Measures   Reviewed items::  Labs reviewed, EKG reviewed and Medications reviewed

## 2018-08-22 NOTE — ASSESSMENT & PLAN NOTE
Status post recent ST elevation MI and LAD stenting  Cardiology consulted  Follow-up further recommendations  Continue Effient hold aspirin for now.  Holding Effient tomorrow morning prior to EGD  Follow-up troponin

## 2018-08-22 NOTE — CARE PLAN
Problem: Knowledge Deficit  Goal: Knowledge of disease process/condition, treatment plan, diagnostic tests, and medications will improve    Intervention: Assess knowledge level of disease process/condition, treatment plan, diagnostic tests, and medications  Rn will provided education to patient regarding disease process, treatment plan, diagnostic tests and medications pertinent to the patient's condition. RN will answer questions that the patient has related to their condition.

## 2018-08-22 NOTE — ASSESSMENT & PLAN NOTE
Acute with history of NSAID induced gastritis  Follows up with GI, Dr. Devine as an outpatient  Now on dual antiplatelet agent, aspirin and Effient due to recent ST elevation MI  Per discussion with cardiology, will need to continue Effient due to high risk for stent thrombosis  hold aspirin for now  On Protonix drip  GI to perform EGD tomorrow, recommend hold Effient for morning dose  Positive fecal occult blood testing  H&H stable currently hemoglobin 11  Transfuse platelets and PRBC as needed  Currently hemodynamically stable  Avoid NSAIDs

## 2018-08-23 PROBLEM — K92.2 GI BLEED: Status: RESOLVED | Noted: 2018-08-21 | Resolved: 2018-08-23

## 2018-08-23 LAB
ANION GAP SERPL CALC-SCNC: 5 MMOL/L (ref 0–11.9)
BASOPHILS # BLD AUTO: 0.5 % (ref 0–1.8)
BASOPHILS # BLD: 0.04 K/UL (ref 0–0.12)
BUN SERPL-MCNC: 13 MG/DL (ref 8–22)
CALCIUM SERPL-MCNC: 8.4 MG/DL (ref 8.5–10.5)
CHLORIDE SERPL-SCNC: 113 MMOL/L (ref 96–112)
CO2 SERPL-SCNC: 22 MMOL/L (ref 20–33)
CREAT SERPL-MCNC: 0.68 MG/DL (ref 0.5–1.4)
EOSINOPHIL # BLD AUTO: 0.36 K/UL (ref 0–0.51)
EOSINOPHIL NFR BLD: 4.8 % (ref 0–6.9)
ERYTHROCYTE [DISTWIDTH] IN BLOOD BY AUTOMATED COUNT: 43.5 FL (ref 35.9–50)
GLUCOSE SERPL-MCNC: 113 MG/DL (ref 65–99)
HCT VFR BLD AUTO: 30.7 % (ref 42–52)
HGB BLD-MCNC: 10.1 G/DL (ref 14–18)
HGB BLD-MCNC: 10.2 G/DL (ref 14–18)
HGB BLD-MCNC: 11.3 G/DL (ref 14–18)
IMM GRANULOCYTES # BLD AUTO: 0.03 K/UL (ref 0–0.11)
IMM GRANULOCYTES NFR BLD AUTO: 0.4 % (ref 0–0.9)
LYMPHOCYTES # BLD AUTO: 2.34 K/UL (ref 1–4.8)
LYMPHOCYTES NFR BLD: 31.4 % (ref 22–41)
MCH RBC QN AUTO: 30.8 PG (ref 27–33)
MCHC RBC AUTO-ENTMCNC: 33.2 G/DL (ref 33.7–35.3)
MCV RBC AUTO: 92.7 FL (ref 81.4–97.8)
MONOCYTES # BLD AUTO: 0.8 K/UL (ref 0–0.85)
MONOCYTES NFR BLD AUTO: 10.7 % (ref 0–13.4)
NEUTROPHILS # BLD AUTO: 3.89 K/UL (ref 1.82–7.42)
NEUTROPHILS NFR BLD: 52.2 % (ref 44–72)
NRBC # BLD AUTO: 0 K/UL
NRBC BLD-RTO: 0 /100 WBC
PLATELET # BLD AUTO: 208 K/UL (ref 164–446)
PMV BLD AUTO: 10.5 FL (ref 9–12.9)
POTASSIUM SERPL-SCNC: 3.7 MMOL/L (ref 3.6–5.5)
RBC # BLD AUTO: 3.31 M/UL (ref 4.7–6.1)
SODIUM SERPL-SCNC: 140 MMOL/L (ref 135–145)
WBC # BLD AUTO: 7.5 K/UL (ref 4.8–10.8)

## 2018-08-23 PROCEDURE — 700101 HCHG RX REV CODE 250

## 2018-08-23 PROCEDURE — 700105 HCHG RX REV CODE 258: Performed by: INTERNAL MEDICINE

## 2018-08-23 PROCEDURE — 700111 HCHG RX REV CODE 636 W/ 250 OVERRIDE (IP)

## 2018-08-23 PROCEDURE — 85025 COMPLETE CBC W/AUTO DIFF WBC: CPT

## 2018-08-23 PROCEDURE — A9270 NON-COVERED ITEM OR SERVICE: HCPCS | Performed by: INTERNAL MEDICINE

## 2018-08-23 PROCEDURE — 500066 HCHG BITE BLOCK, ECT: Performed by: INTERNAL MEDICINE

## 2018-08-23 PROCEDURE — 160048 HCHG OR STATISTICAL LEVEL 1-5: Performed by: INTERNAL MEDICINE

## 2018-08-23 PROCEDURE — 80048 BASIC METABOLIC PNL TOTAL CA: CPT

## 2018-08-23 PROCEDURE — 700102 HCHG RX REV CODE 250 W/ 637 OVERRIDE(OP): Performed by: INTERNAL MEDICINE

## 2018-08-23 PROCEDURE — 700111 HCHG RX REV CODE 636 W/ 250 OVERRIDE (IP): Performed by: EMERGENCY MEDICINE

## 2018-08-23 PROCEDURE — 160035 HCHG PACU - 1ST 60 MINS PHASE I: Performed by: INTERNAL MEDICINE

## 2018-08-23 PROCEDURE — 36415 COLL VENOUS BLD VENIPUNCTURE: CPT

## 2018-08-23 PROCEDURE — 99232 SBSQ HOSP IP/OBS MODERATE 35: CPT | Performed by: INTERNAL MEDICINE

## 2018-08-23 PROCEDURE — 160009 HCHG ANES TIME/MIN: Performed by: INTERNAL MEDICINE

## 2018-08-23 PROCEDURE — 770020 HCHG ROOM/CARE - TELE (206)

## 2018-08-23 PROCEDURE — 85018 HEMOGLOBIN: CPT | Mod: 91

## 2018-08-23 PROCEDURE — 700105 HCHG RX REV CODE 258: Performed by: EMERGENCY MEDICINE

## 2018-08-23 PROCEDURE — 160002 HCHG RECOVERY MINUTES (STAT): Performed by: INTERNAL MEDICINE

## 2018-08-23 PROCEDURE — 0DB98ZX EXCISION OF DUODENUM, VIA NATURAL OR ARTIFICIAL OPENING ENDOSCOPIC, DIAGNOSTIC: ICD-10-PCS | Performed by: INTERNAL MEDICINE

## 2018-08-23 PROCEDURE — C9113 INJ PANTOPRAZOLE SODIUM, VIA: HCPCS | Performed by: EMERGENCY MEDICINE

## 2018-08-23 PROCEDURE — 160203 HCHG ENDO MINUTES - 1ST 30 MINS LEVEL 4: Performed by: INTERNAL MEDICINE

## 2018-08-23 PROCEDURE — 88305 TISSUE EXAM BY PATHOLOGIST: CPT

## 2018-08-23 RX ORDER — OMEPRAZOLE 20 MG/1
20 CAPSULE, DELAYED RELEASE ORAL 2 TIMES DAILY
Status: DISCONTINUED | OUTPATIENT
Start: 2018-08-23 | End: 2018-08-24 | Stop reason: HOSPADM

## 2018-08-23 RX ADMIN — PRASUGREL 10 MG: 10 TABLET, FILM COATED ORAL at 17:35

## 2018-08-23 RX ADMIN — METOPROLOL TARTRATE 25 MG: 25 TABLET, FILM COATED ORAL at 17:32

## 2018-08-23 RX ADMIN — SODIUM CHLORIDE: 9 INJECTION, SOLUTION INTRAVENOUS at 00:07

## 2018-08-23 RX ADMIN — ATORVASTATIN CALCIUM 80 MG: 80 TABLET, FILM COATED ORAL at 20:55

## 2018-08-23 RX ADMIN — OMEPRAZOLE 20 MG: 20 CAPSULE, DELAYED RELEASE ORAL at 17:31

## 2018-08-23 RX ADMIN — SODIUM CHLORIDE 8 MG/HR: 9 INJECTION, SOLUTION INTRAVENOUS at 00:07

## 2018-08-23 ASSESSMENT — ENCOUNTER SYMPTOMS
BLOOD IN STOOL: 0
PND: 0
HEARTBURN: 0
ABDOMINAL PAIN: 1
FEVER: 0
COUGH: 0
SPEECH CHANGE: 0
BLURRED VISION: 0
NAUSEA: 0
WHEEZING: 0
NECK PAIN: 0
CONSTITUTIONAL NEGATIVE: 1
HEADACHES: 0
DEPRESSION: 0
CARDIOVASCULAR NEGATIVE: 1
WEAKNESS: 0
EYE PAIN: 0
DIZZINESS: 0
SEIZURES: 0
TREMORS: 0
FALLS: 0
WEIGHT LOSS: 0
SPUTUM PRODUCTION: 0
RESPIRATORY NEGATIVE: 1
DIARRHEA: 0
PALPITATIONS: 0

## 2018-08-23 ASSESSMENT — PAIN SCALES - GENERAL
PAINLEVEL_OUTOF10: 0

## 2018-08-23 ASSESSMENT — LIFESTYLE VARIABLES: SUBSTANCE_ABUSE: 0

## 2018-08-23 NOTE — PROGRESS NOTES
Gastroenterology Progress Note     Author: Amari Juárez   Date & Time Created: 8/23/2018 11:22 AM    Chief Complaint:  Problem: Melena, recent MI with coronary stenting.     Interval History:  S: No abd pain. Still passing some black stools. EGD at 3PM today.     Review of Systems:  Review of Systems   Constitutional: Negative.    Respiratory: Negative.    Cardiovascular: Negative.    Gastrointestinal: Positive for melena.       Physical Exam:  Physical Exam   Constitutional: He is oriented to person, place, and time. He appears well-developed and well-nourished.   Cardiovascular: Normal rate and regular rhythm.    Pulmonary/Chest: Effort normal and breath sounds normal.   Abdominal: Soft. Bowel sounds are normal.   Musculoskeletal: Normal range of motion. He exhibits no edema.   Neurological: He is alert and oriented to person, place, and time.   Skin: Skin is warm and dry.   Psychiatric: He has a normal mood and affect.       Labs:        Invalid input(s): [id number]  Recent Labs      08/21/18   1450  08/21/18   2039  08/22/18   0516   TROPONINI  0.51*  0.49*  0.48*     Recent Labs      08/21/18   1450  08/22/18   0158  08/23/18   0135   SODIUM  137  136  140   POTASSIUM  4.0  3.6  3.7   CHLORIDE  106  107  113*   CO2  23  23  22   BUN  33*  23*  13   CREATININE  0.74  0.64  0.68   CALCIUM  9.2  8.5  8.4*     Recent Labs      08/21/18   1450  08/22/18   0158  08/23/18   0135   ALTSGPT  40   --    --    ASTSGOT  19   --    --    ALKPHOSPHAT  58   --    --    TBILIRUBIN  0.6   --    --    GLUCOSE  129*  113*  113*     Recent Labs      08/21/18   1450   08/22/18   0158  08/22/18   0918  08/22/18   1703  08/23/18   0135   RBC  3.97*   --   3.57*   --    --   3.31*   HEMOGLOBIN  12.4*   < >  10.8*  11.2*  11.4*  10.2*  10.1*   HEMATOCRIT  36.9*   --   33.2*   --    --   30.7*   PLATELETCT  221   --   191   --    --   208   PROTHROMBTM  14.2   --    --    --    --    --    APTT  31.0   --    --    --    --     --    INR  1.13   --    --    --    --    --     < > = values in this interval not displayed.     Recent Labs      18   1450  18   0158  18   0135   WBC  10.1  8.4  7.5   NEUTSPOLYS  68.80  54.90  52.20   LYMPHOCYTES  19.00*  29.50  31.40   MONOCYTES  9.30  11.30  10.70   EOSINOPHILS  2.00  3.40  4.80   BASOPHILS  0.40  0.50  0.50   ASTSGOT  19   --    --    ALTSGPT  40   --    --    ALKPHOSPHAT  58   --    --    TBILIRUBIN  0.6   --    --      Hemodynamics:  Temp (24hrs), Av.6 °C (97.8 °F), Min:36 °C (96.8 °F), Max:36.8 °C (98.2 °F)  Temperature: 36.8 °C (98.2 °F)  Pulse  Av.4  Min: 60  Max: 85   Blood Pressure : 142/88     Respiratory:    Respiration: 18, Pulse Oximetry: 97 %           Fluids:    Intake/Output Summary (Last 24 hours) at 18 1122  Last data filed at 18 0700   Gross per 24 hour   Intake             3020 ml   Output             1600 ml   Net             1420 ml     Weight: 92 kg (202 lb 13.2 oz)  GI/Nutrition:  Orders Placed This Encounter   Procedures   • DIET NPO     Standing Status:   Standing     Number of Occurrences:   8     Order Specific Question:   Restrict to:     Answer:   Strict [1]     Medical Decision Making, by Problem:  Active Hospital Problems    Diagnosis   • *GI bleed [K92.2]   • Coronary artery disease involving native coronary artery [I25.10]   • Diabetes mellitus type 2, controlled (HCC) [E11.9]   • Dyslipidemia [E78.5]   • Hypertension [I10]   Impression:  1. Melena.  2. Anemia of acute bleed.  3. Recent MI  4. Antiplatelet therapy.     Plan:  1. EGD in OR with anesthesia 3PM  2. Cont PPI.     Quality-Core Measures   Reviewed items::  Labs reviewed and Medications reviewed

## 2018-08-23 NOTE — OR NURSING
Patient tolerated recovery well. A&O x4. Denies pain or nausea. Report called to KATH Cruz for transport to room. VS SABAL.

## 2018-08-23 NOTE — PROGRESS NOTES
[facility] Hospitalist Progress Note    Date of Service: 2018    Chief Complaint  73 y.o. male admitted 2018 with GI bleed.  Patient has recent stent placed.  Cardiologist has been consulted on admission and recommend continue Effient.  Aspirin has been held.  GI is consulted and recommend hold Effient for only 1 dose prior to the colonoscopy.  Patient is scheduled for EGD during the hospital stay.    Interval Problem Update   patient currently stable has no further GI bleed.  Effient will be held tomorrow morning prior to EGD.  Patient currently medically stable and no chest pain.  Aspirin has been held because of active GI bleed.  Hemoglobin remains stable.    Consultants/Specialty  Card  GI    Disposition  tbd        Review of Systems   Constitutional: Negative for chills, fever and weight loss.   HENT: Negative for congestion, ear discharge, ear pain, hearing loss and nosebleeds.    Eyes: Negative for blurred vision and pain.   Respiratory: Negative for cough, sputum production, shortness of breath and wheezing.    Cardiovascular: Negative for chest pain, palpitations, leg swelling and PND.   Gastrointestinal: Positive for abdominal pain and blood in stool. Negative for constipation, diarrhea, heartburn, nausea and vomiting.   Genitourinary: Negative for dysuria, frequency and hematuria.   Musculoskeletal: Negative for back pain, falls, joint pain and neck pain.   Skin: Negative for rash.   Neurological: Negative for dizziness, tremors, speech change, seizures, weakness and headaches.   Psychiatric/Behavioral: Negative for depression, substance abuse and suicidal ideas.      Physical Exam  Laboratory/Imaging   Hemodynamics  Temp (24hrs), Av.5 °C (97.7 °F), Min:36.3 °C (97.3 °F), Max:36.9 °C (98.4 °F)   Temperature: 36.8 °C (98.2 °F)  Pulse  Av  Min: 60  Max: 85 Heart Rate (Monitored): 60  Blood Pressure : 123/71, NIBP: 158/83      Respiratory      Respiration: 17, Pulse Oximetry: 97 %, O2  Daily Delivery Respiratory : Room Air with O2 Available             Fluids    Intake/Output Summary (Last 24 hours) at 08/22/18 1712  Last data filed at 08/22/18 1500   Gross per 24 hour   Intake              300 ml   Output             2600 ml   Net            -2300 ml       Nutrition  Orders Placed This Encounter   Procedures   • Diet Order Clear Liquids - No Red Foods, Cardiac     Standing Status:   Standing     Number of Occurrences:   1     Order Specific Question:   Diet:     Answer:   Clear Liquids - No Red Foods [12]     Order Specific Question:   Diet:     Answer:   Cardiac [6]   • DIET NPO     Standing Status:   Standing     Number of Occurrences:   8     Order Specific Question:   Restrict to:     Answer:   Strict [1]     Physical Exam   Constitutional: He is oriented to person, place, and time. He appears well-developed and well-nourished.   HENT:   Head: Normocephalic.   Eyes: Pupils are equal, round, and reactive to light. EOM are normal.   Neck: Normal range of motion. Neck supple. No JVD present. No thyromegaly present.   Cardiovascular: Normal rate, regular rhythm and normal heart sounds.  Exam reveals no gallop and no friction rub.    No murmur heard.  Pulmonary/Chest: Effort normal and breath sounds normal. No respiratory distress. He has no rales. He exhibits no tenderness.   Abdominal: Soft. Bowel sounds are normal. He exhibits no distension and no mass. There is tenderness. There is no rebound and no guarding.   Musculoskeletal: Normal range of motion. He exhibits no edema.   Lymphadenopathy:     He has no cervical adenopathy.   Neurological: He is alert and oriented to person, place, and time. He displays normal reflexes. No cranial nerve deficit.   Skin: Skin is dry. No rash noted.   Psychiatric: He has a normal mood and affect.   Nursing note and vitals reviewed.      Recent Labs      08/21/18   1450  08/21/18   2039  08/22/18   0158  08/22/18   0918   WBC  10.1   --   8.4   --    RBC  3.97*    --   3.57*   --    HEMOGLOBIN  12.4*  12.3*  10.8*  11.2*   HEMATOCRIT  36.9*   --   33.2*   --    MCV  92.9   --   93.0   --    MCH  31.2   --   30.3   --    MCHC  33.6*   --   32.5*   --    RDW  42.6   --   42.7   --    PLATELETCT  221   --   191   --    MPV  10.4   --   10.2   --      Recent Labs      08/21/18   1450  08/22/18   0158   SODIUM  137  136   POTASSIUM  4.0  3.6   CHLORIDE  106  107   CO2  23  23   GLUCOSE  129*  113*   BUN  33*  23*   CREATININE  0.74  0.64   CALCIUM  9.2  8.5     Recent Labs      08/21/18   1450   APTT  31.0   INR  1.13                  Assessment/Plan     * GI bleed- (present on admission)   Assessment & Plan    Acute with history of NSAID induced gastritis  Follows up with GI, Dr. Devine as an outpatient  Now on dual antiplatelet agent, aspirin and Effient due to recent ST elevation MI  Per discussion with cardiology, will need to continue Effient due to high risk for stent thrombosis  hold aspirin for now  On Protonix drip  GI to perform EGD tomorrow, recommend hold Effient for morning dose  Positive fecal occult blood testing  H&H stable currently hemoglobin 11  Transfuse platelets and PRBC as needed  Currently hemodynamically stable  Avoid NSAIDs          Coronary artery disease involving native coronary artery- (present on admission)   Assessment & Plan    Status post recent ST elevation MI and LAD stenting  Cardiology consulted  Follow-up further recommendations  Continue Effient hold aspirin for now.  Holding Effient tomorrow morning prior to EGD  Follow-up troponin        Diabetes mellitus type 2, controlled (HCC)- (present on admission)   Assessment & Plan    Diet controlled  Without long-term use of insulin, uncomplicated  Monitor        Dyslipidemia- (present on admission)   Assessment & Plan    Continue statin        Hypertension- (present on admission)   Assessment & Plan    Blood pressure stable  Continue home medication            Quality-Core Measures   Reviewed  items::  Medications reviewed, Radiology images reviewed and Labs reviewed  Tong catheter::  No Tong  DVT prophylaxis pharmacological::  Contraindicated - High bleeding risk  DVT prophylaxis - mechanical:  SCDs  Ulcer Prophylaxis::  Not indicated   For complexity-based billing, please refer to the history, exam, and decison making above. In addition, I spent >35 minutes caring for the patient today. More than 50% of the time was spent counseling and coordinating care.    I have discussed with RN and CM and SW and other consultants about patient's plan.

## 2018-08-23 NOTE — PROGRESS NOTES
Patient back on the floor, tele monitor in place. No complaints of pain. Family present at bedside. Will continue to monitor.

## 2018-08-23 NOTE — PROGRESS NOTES
[facility] Progress Note    Date of Service: 2018    Chief Complaint  73 y.o. male admitted 2018 with GI bleed.  Patient has recent stent placed.  Cardiologist has been consulted on admission and recommend continue Effient.  Aspirin has been held.  GI is consulted and recommend hold Effient for only 1 dose prior to the colonoscopy.  Patient is scheduled for EGD during the hospital stay.    Interval Problem Update   patient currently stable has no further GI bleed.  Effient will be held tomorrow morning prior to EGD.  Patient currently medically stable and no chest pain.  Aspirin has been held because of active GI bleed.  Hemoglobin remains stable.   patient underwent EGD and had esophagitis without significant bleed.  GI recommend can restart aspirin and Effient.  Also biopsy pending.  Patient currently hemoglobin stable.  Close monitor.    Consultants/Specialty  Card  GI    Disposition  tbd        Review of Systems   Constitutional: Negative for fever and weight loss.   HENT: Negative for ear discharge, ear pain and hearing loss.    Eyes: Negative for blurred vision and pain.   Respiratory: Negative for cough, sputum production and wheezing.    Cardiovascular: Negative for chest pain, palpitations, leg swelling and PND.   Gastrointestinal: Positive for abdominal pain. Negative for blood in stool, diarrhea, heartburn and nausea.   Genitourinary: Negative for dysuria, frequency and hematuria.   Musculoskeletal: Negative for falls and neck pain.   Skin: Negative for rash.   Neurological: Negative for dizziness, tremors, speech change, seizures, weakness and headaches.   Psychiatric/Behavioral: Negative for depression, substance abuse and suicidal ideas.      Physical Exam  Laboratory/Imaging   Hemodynamics  Temp (24hrs), Av.6 °C (97.9 °F), Min:36 °C (96.8 °F), Max:36.8 °C (98.3 °F)   Temperature: (P) 36.4 °C (97.6 °F)  Pulse  Av.2  Min: 60  Max: 85 Heart Rate (Monitored): (!)  57  Blood Pressure : (!) (P) 167/88, NIBP: 130/64      Respiratory      Respiration: (P) 18, Pulse Oximetry: (P) 96 %             Fluids    Intake/Output Summary (Last 24 hours) at 08/23/18 1620  Last data filed at 08/23/18 1459   Gross per 24 hour   Intake             2860 ml   Output             1100 ml   Net             1760 ml       Nutrition  Orders Placed This Encounter   Procedures   • Diet Order Cardiac     Standing Status:   Standing     Number of Occurrences:   1     Order Specific Question:   Diet:     Answer:   Cardiac [6]     Physical Exam   Constitutional: He is oriented to person, place, and time. He appears well-developed.   HENT:   Head: Normocephalic.   Eyes: Pupils are equal, round, and reactive to light. EOM are normal.   Neck: Normal range of motion. Neck supple. No JVD present. No thyromegaly present.   Cardiovascular: Normal rate, regular rhythm and normal heart sounds.  Exam reveals no gallop and no friction rub.    No murmur heard.  Pulmonary/Chest: Effort normal and breath sounds normal. No respiratory distress. He has no wheezes. He exhibits no tenderness.   Abdominal: Soft. Bowel sounds are normal. He exhibits no mass. There is no tenderness. There is no rebound and no guarding.   Musculoskeletal: Normal range of motion. He exhibits no edema.   Lymphadenopathy:     He has no cervical adenopathy.   Neurological: He is alert and oriented to person, place, and time. He displays normal reflexes. No cranial nerve deficit.   Skin: Skin is dry. No rash noted. He is not diaphoretic.   Psychiatric: He has a normal mood and affect.   Nursing note and vitals reviewed.      Recent Labs      08/21/18   1450   08/22/18   0158   08/22/18   1703  08/23/18   0135  08/23/18   0854   WBC  10.1   --   8.4   --    --   7.5   --    RBC  3.97*   --   3.57*   --    --   3.31*   --    HEMOGLOBIN  12.4*   < >  10.8*   < >  11.4*  10.2*  10.1*  11.3*   HEMATOCRIT  36.9*   --   33.2*   --    --   30.7*   --    MCV   92.9   --   93.0   --    --   92.7   --    MCH  31.2   --   30.3   --    --   30.8   --    MCHC  33.6*   --   32.5*   --    --   33.2*   --    RDW  42.6   --   42.7   --    --   43.5   --    PLATELETCT  221   --   191   --    --   208   --    MPV  10.4   --   10.2   --    --   10.5   --     < > = values in this interval not displayed.     Recent Labs      08/21/18   1450  08/22/18   0158  08/23/18   0135   SODIUM  137  136  140   POTASSIUM  4.0  3.6  3.7   CHLORIDE  106  107  113*   CO2  23  23  22   GLUCOSE  129*  113*  113*   BUN  33*  23*  13   CREATININE  0.74  0.64  0.68   CALCIUM  9.2  8.5  8.4*     Recent Labs      08/21/18   1450   APTT  31.0   INR  1.13                  Assessment/Plan     * GI bleed- (present on admission)   Assessment & Plan    Acute with history of NSAID induced gastritis  Follows up with GI, Dr. Devine as an outpatient  Now on dual antiplatelet agent, aspirin and Effient due to recent ST elevation MI  Per discussion with cardiology, will need to continue Effient due to high risk for stent thrombosis  Restart aspirin for now  On PPI bid, can be discharged with PPI daily  GI to perform EGD tomorrow, recommend hold Effient for morning dose  Positive fecal occult blood testing  H&H stable currently hemoglobin 11->10->9 likley dilutional  Transfuse platelets and PRBC as needed  Currently hemodynamically stable  Avoid NSAIDs          Coronary artery disease involving native coronary artery- (present on admission)   Assessment & Plan    Status post recent ST elevation MI and LAD stenting  Cardiology consulted  Follow-up further recommendations  Continue Effient and aspirin for now.  Follow-up troponin        Diabetes mellitus type 2, controlled (HCC)- (present on admission)   Assessment & Plan    Diet controlled  Without long-term use of insulin, uncomplicated  Monitor        Dyslipidemia- (present on admission)   Assessment & Plan    Continue statin        Hypertension- (present on admission)    Assessment & Plan    Blood pressure stable  Continue home medication            Quality-Core Measures   Reviewed items::  Medications reviewed, Radiology images reviewed and Labs reviewed  Tong catheter::  No Tong  DVT prophylaxis pharmacological::  Contraindicated - High bleeding risk  DVT prophylaxis - mechanical:  SCDs  Ulcer Prophylaxis::  Not indicated   For complexity-based billing, please refer to the history, exam, and decison making above. In addition, I caring for the patient today. More than 50% of the time was spent counseling and coordinating care.    I have discussed with RN and NITESH and SW and other consultants about patient's plan.

## 2018-08-23 NOTE — OR SURGEON
Immediate Post OP Note    PreOp Diagnosis: melena on antiplatelet therapy    PostOp Diagnosis:   1. Schatzki ring at 40cm  2. Hiatal hernia from 40-43cm  3. Antral gastritis and scatterered erosions. No active bleed.  4. 2cm duodenal bulb polyp biopsied.     Procedure(s):  GASTROSCOPY - Wound Class: Clean Contaminated    Surgeon(s):  Amari Juárez M.D.    Anesthesiologist/Type of Anesthesia:  Anesthesiologist: Paul Rutledge M.D./General    Surgical Staff:  Circulator: Chan Bell R.N.  Endoscopy Technician: Francisco Robins    Specimens removed if any:  A. Bx duodenal polyp.     Estimated Blood Loss: None    Findings: see above.     Complications: None immediate  Plan:  1. OK to continue ASA and Effient. Low risk for rebleed.  2. Await path  3. Reg diet.   4. BID omeprazole. Home on once a day omeprazole.         8/23/2018 3:01 PM Amari Juárez M.D.

## 2018-08-23 NOTE — CARE PLAN
Problem: Safety  Goal: Will remain free from falls    Intervention: Implement fall precautions   08/23/18 0049   OTHER   Environmental Precautions Treaded Slipper Socks on Patient;Personal Belongings, Wastebasket, Call Bell etc. in Easy Reach;Transferred to Stronger Side;Communication Sign for Patients & Families;Bed in Low Position;Report Given to Other Health Care Providers Regarding Fall Risk;Mobility Assessed & Appropriate Sign Placed   Bedrails Bedrails Closest to Bathroom Down         Problem: Knowledge Deficit  Goal: Knowledge of disease process/condition, treatment plan, diagnostic tests, and medications will improve    Intervention: Assess knowledge level of disease process/condition, treatment plan, diagnostic tests, and medications  Rn will provided education to patient regarding disease process, treatment plan, diagnostic tests and medications pertinent to the patient's condition. RN will answer questions that the patient has related to their condition.

## 2018-08-23 NOTE — PROGRESS NOTES
Patient awake in bed throughout early evening. RN was able to coordinate patient switching rooms to a private room. Patient was having anxiety resulting in elevated BP's due to lack of sleep caused by noise from the shared room. Patient now in a private room. And resting well. Patient's wife also called and expressed her concern over patient's lack of rest. Patient declines any new onsets of pain. Patient does not express any additional needs at this time. Call light within reach. Fall precautions in place. Will continue with hourly rounding.

## 2018-08-23 NOTE — PROGRESS NOTES
Report received at bedside, assumed care of patient. Patient up to bathroom, on room air, no signs of distress. Patient currently NPO, plan of care discussed, pt educated on call light. Bed in lowest position, call light within reach. Will continue to monitor.

## 2018-08-23 NOTE — PROGRESS NOTES
Bedside report received. Patient sitting up in bed. RN assessed pain; patient stated no pain present. Patient educated to call for assistance before ambulating; call light within reach. Need for bed alarm assessed; patient is at no risk for falls. Precautions in place as appropriate.

## 2018-08-23 NOTE — OP REPORT
DATE OF SERVICE:  08/23/2018    PROCEDURE:  Gastroscopy.    INDICATION:  A 73-year-old status post recent percutaneous coronary stenting,   on aspirin and Effient with melena.    ASA:  Class III.    SEDATION:  Monitored anesthesia care per Dr. Rutledge.    FINDINGS:  1.  Schatzki ring at 40 cm.  2.  Hiatal hernia from 40-43 cm.  3.  Scattered antral gastritis and antral erosions with no active bleeding.  4.  2 cm duodenal bulb polyp, biopsied.    DESCRIPTION OF PROCEDURE:  After informed consent and timeout, he was   administered IV propofol sedation.  Once he was quiet, the Olympus flexible   video gastroscope was passed gently into the esophagus.  Mucosa was normal   down to the GE junction at 40 cm where there was a Schatzki ring with a   luminal diameter of approximately 14-15 mm.  There is a 3 cm hiatal hernia   from 40-43 cm.  Stomach was entered, insufflated with air.  There was no fresh   or old blood.  The body of the stomach was normal.  In the antrum, there was   scattered patchy gastritis and several small erosions without any aneudy   ulceration.  The pylorus was patent.  In the distal duodenal bulb, there was   an erythematous proximally 1.5-2 cm polyp.  The second portion of the duodenum   was normal.    Scope was then brought back into the duodenal bulb and several biopsies were   taken from the polypoid lesion and duodenum.  Scope was brought back up in the   stomach and retroflex view of the gastric cardia showed a hiatal hernia.  He   tolerated the procedure well and went to recovery room in stable condition.    IMPRESSION:  1.  Schatzki ring at 40 cm.  2.  Hiatal hernia from 40-43 cm.  3.  Antral gastritis with scattered erosions, which was likely the source of   the bleeding.  No active bleeding currently.  4.  A 2 cm unusual duodenal bulb polyp.    PLAN:  1.  He can continue aspirin and Effient.  He has low risk for rebleeding.  2.  Await pathology.  3.  Regular diet.  4.  B.i.d. omeprazole.  He  can go home on once a day omeprazole, which he will   need to stay on for the duration of his Effient.       ____________________________________     ELVER HOOKS MD    [facility] / [facility]    DD:  08/23/2018 15:06:59  DT:  08/23/2018 15:20:53    D#:  [id number]  Job#:  [id number]    cc: JOHAN SCOTT MD

## 2018-08-24 VITALS
HEART RATE: 66 BPM | DIASTOLIC BLOOD PRESSURE: 82 MMHG | BODY MASS INDEX: 26.64 KG/M2 | SYSTOLIC BLOOD PRESSURE: 163 MMHG | TEMPERATURE: 97.1 F | RESPIRATION RATE: 20 BRPM | OXYGEN SATURATION: 99 % | WEIGHT: 201.94 LBS

## 2018-08-24 LAB
ANION GAP SERPL CALC-SCNC: 8 MMOL/L (ref 0–11.9)
BASOPHILS # BLD AUTO: 0.8 % (ref 0–1.8)
BASOPHILS # BLD: 0.06 K/UL (ref 0–0.12)
BUN SERPL-MCNC: 14 MG/DL (ref 8–22)
CALCIUM SERPL-MCNC: 8.7 MG/DL (ref 8.5–10.5)
CHLORIDE SERPL-SCNC: 111 MMOL/L (ref 96–112)
CO2 SERPL-SCNC: 23 MMOL/L (ref 20–33)
CREAT SERPL-MCNC: 0.69 MG/DL (ref 0.5–1.4)
EOSINOPHIL # BLD AUTO: 0.26 K/UL (ref 0–0.51)
EOSINOPHIL NFR BLD: 3.4 % (ref 0–6.9)
ERYTHROCYTE [DISTWIDTH] IN BLOOD BY AUTOMATED COUNT: 43 FL (ref 35.9–50)
GLUCOSE SERPL-MCNC: 109 MG/DL (ref 65–99)
HCT VFR BLD AUTO: 29.1 % (ref 42–52)
HGB BLD-MCNC: 9.6 G/DL (ref 14–18)
IMM GRANULOCYTES # BLD AUTO: 0.01 K/UL (ref 0–0.11)
IMM GRANULOCYTES NFR BLD AUTO: 0.1 % (ref 0–0.9)
LYMPHOCYTES # BLD AUTO: 2.19 K/UL (ref 1–4.8)
LYMPHOCYTES NFR BLD: 28.6 % (ref 22–41)
MCH RBC QN AUTO: 30.5 PG (ref 27–33)
MCHC RBC AUTO-ENTMCNC: 33 G/DL (ref 33.7–35.3)
MCV RBC AUTO: 92.4 FL (ref 81.4–97.8)
MONOCYTES # BLD AUTO: 0.77 K/UL (ref 0–0.85)
MONOCYTES NFR BLD AUTO: 10.1 % (ref 0–13.4)
NEUTROPHILS # BLD AUTO: 4.36 K/UL (ref 1.82–7.42)
NEUTROPHILS NFR BLD: 57 % (ref 44–72)
NRBC # BLD AUTO: 0 K/UL
NRBC BLD-RTO: 0 /100 WBC
PLATELET # BLD AUTO: 223 K/UL (ref 164–446)
PMV BLD AUTO: 10.4 FL (ref 9–12.9)
POTASSIUM SERPL-SCNC: 3.5 MMOL/L (ref 3.6–5.5)
RBC # BLD AUTO: 3.15 M/UL (ref 4.7–6.1)
SODIUM SERPL-SCNC: 142 MMOL/L (ref 135–145)
WBC # BLD AUTO: 7.7 K/UL (ref 4.8–10.8)

## 2018-08-24 PROCEDURE — A9270 NON-COVERED ITEM OR SERVICE: HCPCS | Performed by: INTERNAL MEDICINE

## 2018-08-24 PROCEDURE — 700102 HCHG RX REV CODE 250 W/ 637 OVERRIDE(OP): Performed by: INTERNAL MEDICINE

## 2018-08-24 PROCEDURE — 80048 BASIC METABOLIC PNL TOTAL CA: CPT

## 2018-08-24 PROCEDURE — 85025 COMPLETE CBC W/AUTO DIFF WBC: CPT

## 2018-08-24 PROCEDURE — 99239 HOSP IP/OBS DSCHRG MGMT >30: CPT | Performed by: INTERNAL MEDICINE

## 2018-08-24 PROCEDURE — 36415 COLL VENOUS BLD VENIPUNCTURE: CPT

## 2018-08-24 RX ORDER — LOSARTAN POTASSIUM 50 MG/1
50 TABLET ORAL DAILY
Status: DISCONTINUED | OUTPATIENT
Start: 2018-08-25 | End: 2018-08-24 | Stop reason: HOSPADM

## 2018-08-24 RX ORDER — OMEPRAZOLE 20 MG/1
20 CAPSULE, DELAYED RELEASE ORAL 2 TIMES DAILY
Qty: 30 CAP | Refills: 1 | Status: SHIPPED | OUTPATIENT
Start: 2018-08-24 | End: 2018-08-28 | Stop reason: SDUPTHER

## 2018-08-24 RX ADMIN — PRASUGREL 10 MG: 10 TABLET, FILM COATED ORAL at 05:06

## 2018-08-24 RX ADMIN — ASPIRIN 81 MG: 81 TABLET, DELAYED RELEASE ORAL at 05:05

## 2018-08-24 RX ADMIN — OMEPRAZOLE 20 MG: 20 CAPSULE, DELAYED RELEASE ORAL at 05:06

## 2018-08-24 RX ADMIN — METOPROLOL TARTRATE 25 MG: 25 TABLET, FILM COATED ORAL at 05:06

## 2018-08-24 RX ADMIN — LOSARTAN POTASSIUM 25 MG: 25 TABLET, FILM COATED ORAL at 05:06

## 2018-08-24 ASSESSMENT — PAIN SCALES - GENERAL: PAINLEVEL_OUTOF10: 0

## 2018-08-24 NOTE — DISCHARGE INSTRUCTIONS
Discharge Instructions    Discharged to home by car with relative. Discharged via wheelchair, hospital escort: Yes.  Special equipment needed: Not Applicable    Be sure to schedule a follow-up appointment with your primary care doctor or any specialists as instructed.     Discharge Plan:   Diet Plan: Discussed  Activity Level: Discussed  Confirmed Follow up Appointment: Appointment Scheduled  Confirmed Symptoms Management: Discussed  Medication Reconciliation Updated: Yes  Pneumococcal Vaccine Administered/Refused: Not given - Patient refused pneumococcal vaccine  Influenza Vaccine Indication: Patient Refuses    I understand that a diet low in cholesterol, fat, and sodium is recommended for good health. Unless I have been given specific instructions below for another diet, I accept this instruction as my diet prescription.   Other diet: diabetic, low salt, low cholesterol, low fat    Special Instructions: None    · Is patient discharged on Warfarin / Coumadin?   No     Depression / Suicide Risk    As you are discharged from this [facility] facility, it is important to learn how to keep safe from harming yourself.    Recognize the warning signs:  · Abrupt changes in personality, positive or negative- including increase in energy   · Giving away possessions  · Change in eating patterns- significant weight changes-  positive or negative  · Change in sleeping patterns- unable to sleep or sleeping all the time   · Unwillingness or inability to communicate  · Depression  · Unusual sadness, discouragement and loneliness  · Talk of wanting to die  · Neglect of personal appearance   · Rebelliousness- reckless behavior  · Withdrawal from people/activities they love  · Confusion- inability to concentrate     If you or a loved one observes any of these behaviors or has concerns about self-harm, here's what you can do:  · Talk about it- your feelings and reasons for harming yourself  · Remove any means that you might use to  hurt yourself (examples: pills, rope, extension cords, firearm)  · Get professional help from the community (Mental Health, Substance Abuse, psychological counseling)  · Do not be alone:Call your Safe Contact- someone whom you trust who will be there for you.  · Call your local CRISIS HOTLINE 177-5840 or 716-800-7894  · Call your local Children's Mobile Crisis Response Team [place] (442) 609-8906 or www.[employer]  · Call the toll free National Suicide Prevention Hotlines   · National Suicide Prevention Lifeline 140-427-AGNQ (3239)  · [employer] Hope Line Network 800-SUICIDE (978-6074)      Gastrointestinal Bleeding  Introduction  Gastrointestinal bleeding is bleeding somewhere along the path food travels through the body (digestive tract). This path is anywhere between the mouth and the opening of the butt (anus). You may have blood in your poop (stools) or have black poop. If you throw up (vomit), there may be blood in it.  This condition can be mild, serious, or even life-threatening. If you have a lot of bleeding, you may need to stay in the hospital.  Follow these instructions at home:  · Take over-the-counter and prescription medicines only as told by your doctor.  · Eat foods that have a lot of fiber in them. These foods include whole grains, fruits, and vegetables. You can also try eating 1-3 prunes each day.  · Drink enough fluid to keep your pee (urine) clear or pale yellow.  · Keep all follow-up visits as told by your doctor. This is important.  Contact a doctor if:  · Your symptoms do not get better.  Get help right away if:  · Your bleeding gets worse.  · You feel dizzy or you pass out (faint).  · You feel weak.  · You have very bad cramps in your back or belly (abdomen).  · You pass large clumps of blood (clots) in your poop.  · Your symptoms are getting worse.  This information is not intended to replace advice given to you by your health care provider. Make sure you discuss any questions  you have with your health care provider.  Document Released: 09/26/2009 Document Revised: 05/25/2017 Document Reviewed: 06/06/2016  © 2017 [employer]

## 2018-08-24 NOTE — CARE PLAN
Problem: Safety  Goal: Will remain free from falls    Intervention: Implement fall precautions   08/23/18 2229   OTHER   Environmental Precautions Treaded Slipper Socks on Patient;Personal Belongings, Wastebasket, Call Bell etc. in Easy Reach;Transferred to Stronger Side;Communication Sign for Patients & Families;Bed in Low Position;Report Given to Other Health Care Providers Regarding Fall Risk;Mobility Assessed & Appropriate Sign Placed   Bedrails Bedrails Closest to Bathroom Down         Problem: Knowledge Deficit  Goal: Knowledge of disease process/condition, treatment plan, diagnostic tests, and medications will improve    Intervention: Assess knowledge level of disease process/condition, treatment plan, diagnostic tests, and medications  Rn will provided education to patient regarding disease process, treatment plan, diagnostic tests and medications pertinent to the patient's condition. RN will answer questions that the patient has related to their condition.

## 2018-08-24 NOTE — PROGRESS NOTES
Problem: Melena on ASA and prasugrel  S: Stools still dark and somewhat loose. EGD yesterday showed gastritis but no active bleed.   O: Hct 29  A: Gastritis with bleeding.   P: OK for DC today on omeprazole 20mg QAM. I'll contact him with pathology and arrange office followup.

## 2018-08-24 NOTE — PROGRESS NOTES
Discharge instructions reviewed with pt and wife. All questions answered. Tele box and IV removed. Pt walked to personal vehicle to be taken home by wife.

## 2018-08-24 NOTE — PROGRESS NOTES
Patient resting in bed throughout most of evening. Patient did express some anxiety related to length of hospital stay. RN provided active ;istening and education regarding plan of care. Patient expressed understanding. Patient declines any new onsets of pain. Patient declines any additional needs at this time. Call light within reach. Fall precautions in place.

## 2018-08-24 NOTE — PROGRESS NOTES
Bedside report received. Patient sitting up in bedside chair. Plan of care discussed. RN assessed pain; no stated pain present. Patient educated to call for assistance before ambulating; call light within reach. Need for bed alarm assessed; patient is at no risk for falls. Precautions in place as appropriate.

## 2018-08-25 NOTE — DISCHARGE SUMMARY
Discharge Summary    CHIEF COMPLAINT ON ADMISSION  Chief Complaint   Patient presents with   • Blood in Urine     Pt reports to have blood in urine yesterday/ tarry stool today. pt reports to have new med (prasurgrel) after having stents placed recently   • Melena   • Cramping     mild       Reason for Admission  GI bleed    Admission Date  2018    CODE STATUS  Prior    HPI & HOSPITAL COURSE  This is a 73 y.o. male here with complaint of GI bleed.  Patient was seen by GI physician in the hospital and EGD was performed.  The results showin. Schatzki ring at 40cm  2. Hiatal hernia from 40-43cm  3. Antral gastritis and scatterered erosions. No active bleed.  4. 2cm duodenal bulb polyp biopsied.   Patient tolerated the procedure and postoperatively patient returned to baseline very well.  Prior to procedure patient's aspirin was held and patient's Effient was temporarily held for just in the morning.  Postoperatively patient was started on omeprazole 20 mg twice daily and also restart aspirin and Effient.  Patient currently stable and recommended to continue use aspirin and Effient because of drug-eluting stent placed by cardiologist recently.  Patient also is instructed to have omeprazole 20 mg once a day as outpatient.  Patient currently stable and ready to be discharged home.       Therefore, he is discharged in good and stable condition to home with close outpatient follow-up.    The patient met 2-midnight criteria for an inpatient stay at the time of discharge.    Discharge Date  2018    FOLLOW UP ITEMS POST DISCHARGE  none    DISCHARGE DIAGNOSES      GI bleed POA: Yes    Hypertension (Chronic) POA: Yes      Dyslipidemia (Chronic) POA: Yes    Diabetes mellitus type 2, controlled (HCC) (Chronic) POA: Yes      Coronary artery disease involving native coronary artery POA: Yes        FOLLOW UP  Future Appointments  Date Time Provider Department Center   2018 9:45 AM Guerrero Gabriel M.D. [facility] None      No follow-up provider specified.    MEDICATIONS ON DISCHARGE     Medication List      START taking these medications      Instructions   omeprazole 20 MG delayed-release capsule  Commonly known as:  PRILOSEC   Take 1 Cap by mouth 2 Times a Day.  Dose:  20 mg        CONTINUE taking these medications      Instructions   aspirin 81 MG EC tablet   Take 1 Tab by mouth every day.  Dose:  81 mg     atorvastatin 80 MG tablet  Commonly known as:  LIPITOR   Take 1 Tab by mouth every bedtime.  Dose:  80 mg     losartan 25 MG Tabs  Commonly known as:  COZAAR   Take 1 Tab by mouth every day.  Dose:  25 mg     metoprolol 25 MG Tabs  Commonly known as:  LOPRESSOR   Take 1 Tab by mouth 2 Times a Day.  Dose:  25 mg     prasugrel 10 MG Tabs  Commonly known as:  EFFIENT   Take 1 Tab by mouth every day.  Dose:  10 mg            Allergies  Allergies   Allergen Reactions   • Aspirin Anaphylaxis     INTERNAL BLEEDING   • Augmentin Vomiting   • Clindamycin      Develop c.diff   • Metronidazole      Not sure   • Nsaids Unspecified     GI bleed   • Vancomycin Swelling   • Ace Inhibitors Cough       DIET  No orders of the defined types were placed in this encounter.      ACTIVITY  As tolerated.  Weight bearing as tolerated    CONSULTATIONS  GI    PROCEDURES  EGD  1. Schatzki ring at 40cm  2. Hiatal hernia from 40-43cm  3. Antral gastritis and scatterered erosions. No active bleed.  4. 2cm duodenal bulb polyp biopsied.     No orders to display       PE:  Gen: AAOx3, NAD  Eyes: PELLA  Neck: no JVD, no lymphadenopathy  Cardia: RRR, no mrg  Lungs: CTAB, no rales, rhonci or wheezing  Abd: NABS, soft, non extended, no mass  EXT: No C/C/E, peripheral pulse 2+ b/l  Neuro: CN II-XII intact, non focal, reflex 2+ symmetrical  Skin: Intact, no lesion, warm  Psych: Appropriate.      LABORATORY  Lab Results   Component Value Date    SODIUM 142 08/24/2018    POTASSIUM 3.5 (L) 08/24/2018    CHLORIDE 111 08/24/2018    CO2 23 08/24/2018    GLUCOSE 109 (H)  08/24/2018    BUN 14 08/24/2018    CREATININE 0.69 08/24/2018    CREATININE 1.0 01/12/2009        Lab Results   Component Value Date    WBC 7.7 08/24/2018    HEMOGLOBIN 9.6 (L) 08/24/2018    HEMATOCRIT 29.1 (L) 08/24/2018    PLATELETCT 223 08/24/2018        Total time of the discharge process exceeds 36 minutes.

## 2018-08-27 NOTE — DOCUMENTATION QUERY
"DOCUMENTATION QUERY    PROVIDERS: Please select “Cosign w/ note”to reply to query.    To better represent the severity of illness of your patient, please review the following information and exercise your independent professional judgment in responding to this query.     \"Coagulopathy - Effient use\" is documented in the GI Progress Notes dated 8/22. Based upon the clinical findings, risk factors, and treatment, please specify if this condition was present on admission or developed after admission    • Coagulopathy -  Effient use was present on admission.  • Coagulopathy - Effient developed after admission  • Unable to determine    The medical record reflects the following:   Clinical Findings 8/21 ED Note: \"He was started on Effient, and history of aspirin-induced GI bleed\" is documented.  8/21 Hemoglobin 12.4; Hematocrit: 36.9  8/21 H&P: \"GI Bleed, history of NSAID induced gastritis, now on dual antiplatelet agent asprin and effient\" is documented.  8/22 GI Progress Note \"Coagulopathy - Effient use due to recent coronary event and coronary stent\" and \"decrease in his anemia, which could be due to his gastrointestinal bleeding\" is documented.  8/22 Hemoglobin: 10.8; Hematocrit: 33.2 8/23 Hemoglogin: 10.2; Hematocrit: 30.7   Treatment Protonix; hold aspirin; GI Consult; lab testing; avoid NSAID's   Risk Factors Age; GI bleed; recent coronary event with stent placement; anticoagulation therapy   Location within medical record GI Consult Notes, History and Physical, Progress Notes, Lab Results and MAR     Thank you,   Terese Nicole RN  Clinical   [phone]          "

## 2018-08-28 ENCOUNTER — PATIENT OUTREACH (OUTPATIENT)
Dept: HEALTH INFORMATION MANAGEMENT | Facility: OTHER | Age: 73
End: 2018-08-28

## 2018-08-28 RX ORDER — OMEPRAZOLE 20 MG/1
20 CAPSULE, DELAYED RELEASE ORAL DAILY
Qty: 90 CAP | Refills: 3 | Status: SHIPPED | OUTPATIENT
Start: 2018-08-28 | End: 2018-12-04 | Stop reason: SDUPTHER

## 2018-09-04 ENCOUNTER — TELEPHONE (OUTPATIENT)
Dept: CARDIOLOGY | Facility: MEDICAL CENTER | Age: 73
End: 2018-09-04

## 2018-09-04 NOTE — TELEPHONE ENCOUNTER
Pt returning voicemail message. Reassured patient that he is able to ask his questions during his appointment as he is a new patient. Pt verbalize understanding. No other questions or concerns noted.

## 2018-09-04 NOTE — TELEPHONE ENCOUNTER
Returned pt call, unable to reach. Left voicemail.    ----- Message -----  From: Apple Bucio  Sent: 9/4/2018   8:41 AM  To: Vickie Styles R.N.  Subject: several questions during appt tomorrow           KENTRELL/vickie    Pt calling to ask if he will have time during tomorrow's appt to ask several questions. Please call pt at .

## 2018-09-07 ENCOUNTER — OFFICE VISIT (OUTPATIENT)
Dept: CARDIOLOGY | Facility: MEDICAL CENTER | Age: 73
End: 2018-09-07
Payer: MEDICARE

## 2018-09-07 VITALS
OXYGEN SATURATION: 99 % | HEIGHT: 73 IN | SYSTOLIC BLOOD PRESSURE: 158 MMHG | DIASTOLIC BLOOD PRESSURE: 90 MMHG | HEART RATE: 56 BPM | BODY MASS INDEX: 26 KG/M2 | WEIGHT: 196.2 LBS

## 2018-09-07 DIAGNOSIS — I46.9 CARDIAC ARREST (HCC): ICD-10-CM

## 2018-09-07 DIAGNOSIS — I21.02 ST ELEVATION MYOCARDIAL INFARCTION INVOLVING LEFT ANTERIOR DESCENDING (LAD) CORONARY ARTERY (HCC): ICD-10-CM

## 2018-09-07 DIAGNOSIS — Z95.820 S/P ANGIOPLASTY WITH STENT: ICD-10-CM

## 2018-09-07 PROCEDURE — 99214 OFFICE O/P EST MOD 30 MIN: CPT | Performed by: INTERNAL MEDICINE

## 2018-09-07 NOTE — PROGRESS NOTES
"Chief Complaint   Patient presents with   • Chest Pain       Subjective:   Vinod Conrad is a 73 y.o. male who presents today for follow-up of cardiac arrest, acute myocardial infarction, and stent placement in mid LAD on 8/15/2018.  For quite some time before that date the patient had  symptoms of arthritis in both shoulders.  These symptoms worsened on the night prior to his cardiac arrest than on the morning of 8/15 his wife talked him into going to the hospital.  He had a cardiac arrest and went to the Cath Lab with an acute anterior myocardial infarction.  He had a stent to his mid LAD and improved dramatically.  An echocardiogram revealed an ejection fraction of 45% wall motion abnormalities in the inferolateral areas. The patient has since been discharged on beta-blocker, ACE inhibitor, atorvastatin, as well as aspirin and Effient.  He had a GI bleed which was life-threatening due to ulcer disease.  The ulcer was clipped and he was started on omeprazole and his antiplatelet medications were restarted.  He has had no angina or shoulder pain and has had no evidence of further GI bleeding.  He also is able to walk around quite a bit more so much shoulder pain.  He still has some arthritis but is not nearly as bad as before this event.  He and his wife had several questions all of which were addressed    Past Medical History:   Diagnosis Date   • Allergy    • Anesthesia 2006    hallucinations post anesthesia, at home   • Arthritis     all joints   • CATARACT     bilateral IOL   • Hyperlipidemia    • Hypertension    • Infectious disease 2011    c diff   • Pain    • Unspecified hemorrhagic conditions 2006    \"abd bleeding\"     Past Surgical History:   Procedure Laterality Date   • GASTROSCOPY N/A 8/23/2018    Procedure: GASTROSCOPY;  Surgeon: Amari Juárez M.D.;  Location: SURGERY [facility];  Service: Gastroenterology   • CERVICAL FORAMINOTOMY  5/20/2013    Performed by Krish Yang M.D. at " "SURGERY [facility]   • OTHER  2010    torn retina RIGHT EYE   • SCLERAL BUCKLING  1/13/2009    Performed by YVETTE DRAPER at SURGERY SAME DAY [facility]   • OTHER ORTHOPEDIC SURGERY  2005    l knee     History reviewed. No pertinent family history.  Social History     Social History   • Marital status:      Spouse name: N/A   • Number of children: N/A   • Years of education: N/A     Occupational History   • Not on file.     Social History Main Topics   • Smoking status: Former Smoker     Packs/day: 2.00     Years: 30.00     Types: Cigarettes     Quit date: 1/1/1998   • Smokeless tobacco: Never Used   • Alcohol use 0.0 oz/week      Comment: 5 per week   • Drug use: No   • Sexual activity: Not on file     Other Topics Concern   • Not on file     Social History Narrative   • No narrative on file     Allergies   Allergen Reactions   • Aspirin Anaphylaxis     INTERNAL BLEEDING   • Augmentin Vomiting   • Clindamycin      Develop c.diff   • Metronidazole      Not sure   • Nsaids Unspecified     GI bleed   • Vancomycin Swelling   • Ace Inhibitors Cough     Outpatient Encounter Prescriptions as of 9/7/2018   Medication Sig Dispense Refill   • omeprazole (PRILOSEC) 20 MG delayed-release capsule Take 1 Cap by mouth every day. 90 Cap 3   • losartan (COZAAR) 25 MG Tab Take 1 Tab by mouth every day. 90 Tab 3   • aspirin EC 81 MG EC tablet Take 1 Tab by mouth every day. 30 Tab 3   • atorvastatin (LIPITOR) 80 MG tablet Take 1 Tab by mouth every bedtime. 30 Tab 3   • metoprolol (LOPRESSOR) 25 MG Tab Take 1 Tab by mouth 2 Times a Day. 60 Tab 3   • prasugrel (EFFIENT) 10 MG Tab Take 1 Tab by mouth every day. 30 Tab 3     No facility-administered encounter medications on file as of 9/7/2018.      ROS.  See HPI     Objective:   /90   Pulse (!) 56   Ht 1.854 m (6' 1\")   Wt 89 kg (196 lb 3.2 oz)   SpO2 99%   BMI 25.89 kg/m²     Physical Exam General: WD, WN, male in NAD.   Neck: No  JVD.  Good carotid " upstroke and no bruit  Chest: Clear to A & P  Heart: Regular rhythm,  S1 > S2. No murmur, gallop, rub, click  Ext: No edema  Neuro: Alert, oriented  Psych: normal mood, affect    Assessment:     1. ST elevation myocardial infarction involving left anterior descending (LAD) coronary artery ([facility])     2. Cardiac arrest ([facility])     3. S/P angioplasty with stent         Medical Decision Making:  Today's Assessment / Status / Plan:   Patient is doing very well from a cardiac standpoint.  His blood pressure has been normal but was slightly elevated today because he went to the wrong office building for his appointment.  The patient has no angina and no exercise impairment.  He rides a recumbent bike which he does for 15 minutes a day without symptoms.  He wants to gradually increase that.  He has had no trouble with the aspirin and the Effient or his other medications.  He feels well and is happy to be alive.  I told him dual antiplatelet therapy is recommended at least 6 months, if not a year.  The metoprolol has benefit for at least several months and the atorvastatin will be a forever drug.  The patient asked about sexual activity which is acceptable at this point.  He may also take Viagra.  He does not feel that cardiac rehabilitation is necessary because he is doing very well with the recumbent bicycle.  He will return in 3 months and will reassess his situation at that point.

## 2018-09-07 NOTE — PROGRESS NOTES
Patient Vinod Conrad discharged on 8/24/18 after experiencing a GI bleed. Patient has been instructed to follow up with PCP Dr. Nieves, though there are no appointments scheduled at this time. Patient is scheduled for a cardiology appointment for 9/5/18, and a GI appointment on 11/7/18. Patient also has a referral for intensive cardiac rehab, though no appointments have been scheduled at this time. [facility] did not make an outreach to this patient, as patient recently declined services both inpatient and with [facility] during first admission due to having a support system at home.

## 2018-09-24 ENCOUNTER — TELEPHONE (OUTPATIENT)
Dept: MEDICAL GROUP | Facility: MEDICAL CENTER | Age: 73
End: 2018-09-24

## 2018-09-24 DIAGNOSIS — D50.0 IRON DEFICIENCY ANEMIA DUE TO CHRONIC BLOOD LOSS: ICD-10-CM

## 2018-09-26 ENCOUNTER — HOSPITAL ENCOUNTER (OUTPATIENT)
Dept: LAB | Facility: MEDICAL CENTER | Age: 73
End: 2018-09-26
Attending: FAMILY MEDICINE
Payer: MEDICARE

## 2018-09-26 DIAGNOSIS — D50.0 IRON DEFICIENCY ANEMIA DUE TO CHRONIC BLOOD LOSS: ICD-10-CM

## 2018-09-26 LAB
BASOPHILS # BLD AUTO: 0.9 % (ref 0–1.8)
BASOPHILS # BLD: 0.06 K/UL (ref 0–0.12)
EOSINOPHIL # BLD AUTO: 0.13 K/UL (ref 0–0.51)
EOSINOPHIL NFR BLD: 2.1 % (ref 0–6.9)
ERYTHROCYTE [DISTWIDTH] IN BLOOD BY AUTOMATED COUNT: 44.2 FL (ref 35.9–50)
FERRITIN SERPL-MCNC: 115.1 NG/ML (ref 22–322)
HCT VFR BLD AUTO: 41.9 % (ref 42–52)
HGB BLD-MCNC: 13.6 G/DL (ref 14–18)
IMM GRANULOCYTES # BLD AUTO: 0.01 K/UL (ref 0–0.11)
IMM GRANULOCYTES NFR BLD AUTO: 0.2 % (ref 0–0.9)
IRON SATN MFR SERPL: 14 % (ref 15–55)
IRON SERPL-MCNC: 53 UG/DL (ref 50–180)
LYMPHOCYTES # BLD AUTO: 1.91 K/UL (ref 1–4.8)
LYMPHOCYTES NFR BLD: 30.2 % (ref 22–41)
MCH RBC QN AUTO: 30.2 PG (ref 27–33)
MCHC RBC AUTO-ENTMCNC: 32.5 G/DL (ref 33.7–35.3)
MCV RBC AUTO: 92.9 FL (ref 81.4–97.8)
MONOCYTES # BLD AUTO: 0.44 K/UL (ref 0–0.85)
MONOCYTES NFR BLD AUTO: 7 % (ref 0–13.4)
NEUTROPHILS # BLD AUTO: 3.78 K/UL (ref 1.82–7.42)
NEUTROPHILS NFR BLD: 59.6 % (ref 44–72)
NRBC # BLD AUTO: 0 K/UL
NRBC BLD-RTO: 0 /100 WBC
PLATELET # BLD AUTO: 203 K/UL (ref 164–446)
PMV BLD AUTO: 11.5 FL (ref 9–12.9)
RBC # BLD AUTO: 4.51 M/UL (ref 4.7–6.1)
TIBC SERPL-MCNC: 371 UG/DL (ref 250–450)
WBC # BLD AUTO: 6.3 K/UL (ref 4.8–10.8)

## 2018-09-26 PROCEDURE — 83540 ASSAY OF IRON: CPT

## 2018-09-26 PROCEDURE — 36415 COLL VENOUS BLD VENIPUNCTURE: CPT

## 2018-09-26 PROCEDURE — 83550 IRON BINDING TEST: CPT

## 2018-09-26 PROCEDURE — 85025 COMPLETE CBC W/AUTO DIFF WBC: CPT

## 2018-09-26 PROCEDURE — 82728 ASSAY OF FERRITIN: CPT

## 2018-10-01 ENCOUNTER — TELEPHONE (OUTPATIENT)
Dept: MEDICAL GROUP | Facility: MEDICAL CENTER | Age: 73
End: 2018-10-01

## 2018-10-01 NOTE — TELEPHONE ENCOUNTER
Please notify patient that his anemia has greatly improved.  This means that he is not bleeding anymore.  He should continue what he is doing and in about 3 months his anemia will hopefully be completely resolved.  Reid Wills M.D.

## 2018-10-12 ENCOUNTER — APPOINTMENT (OUTPATIENT)
Dept: RADIOLOGY | Facility: MEDICAL CENTER | Age: 73
End: 2018-10-12
Attending: EMERGENCY MEDICINE
Payer: MEDICARE

## 2018-10-12 ENCOUNTER — HOSPITAL ENCOUNTER (EMERGENCY)
Facility: MEDICAL CENTER | Age: 73
End: 2018-10-12
Attending: EMERGENCY MEDICINE
Payer: MEDICARE

## 2018-10-12 VITALS
TEMPERATURE: 98.1 F | HEIGHT: 73 IN | RESPIRATION RATE: 19 BRPM | HEART RATE: 49 BPM | DIASTOLIC BLOOD PRESSURE: 81 MMHG | WEIGHT: 201.72 LBS | OXYGEN SATURATION: 98 % | BODY MASS INDEX: 26.73 KG/M2 | SYSTOLIC BLOOD PRESSURE: 183 MMHG

## 2018-10-12 DIAGNOSIS — R07.9 CHEST PAIN, UNSPECIFIED TYPE: ICD-10-CM

## 2018-10-12 LAB
ALBUMIN SERPL BCP-MCNC: 3.7 G/DL (ref 3.2–4.9)
ALBUMIN/GLOB SERPL: 1.5 G/DL
ALP SERPL-CCNC: 81 U/L (ref 30–99)
ALT SERPL-CCNC: 31 U/L (ref 2–50)
ANION GAP SERPL CALC-SCNC: 8 MMOL/L (ref 0–11.9)
APTT PPP: 31.6 SEC (ref 24.7–36)
AST SERPL-CCNC: 27 U/L (ref 12–45)
BASOPHILS # BLD AUTO: 0.4 % (ref 0–1.8)
BASOPHILS # BLD: 0.03 K/UL (ref 0–0.12)
BILIRUB SERPL-MCNC: 0.6 MG/DL (ref 0.1–1.5)
BUN SERPL-MCNC: 15 MG/DL (ref 8–22)
CALCIUM SERPL-MCNC: 8.8 MG/DL (ref 8.4–10.2)
CHLORIDE SERPL-SCNC: 106 MMOL/L (ref 96–112)
CO2 SERPL-SCNC: 24 MMOL/L (ref 20–33)
CREAT SERPL-MCNC: 0.83 MG/DL (ref 0.5–1.4)
EKG IMPRESSION: NORMAL
EKG IMPRESSION: NORMAL
EOSINOPHIL # BLD AUTO: 0.15 K/UL (ref 0–0.51)
EOSINOPHIL NFR BLD: 2.1 % (ref 0–6.9)
ERYTHROCYTE [DISTWIDTH] IN BLOOD BY AUTOMATED COUNT: 43.6 FL (ref 35.9–50)
GLOBULIN SER CALC-MCNC: 2.4 G/DL (ref 1.9–3.5)
GLUCOSE SERPL-MCNC: 182 MG/DL (ref 65–99)
HCT VFR BLD AUTO: 41.5 % (ref 42–52)
HGB BLD-MCNC: 13.5 G/DL (ref 14–18)
IMM GRANULOCYTES # BLD AUTO: 0.02 K/UL (ref 0–0.11)
IMM GRANULOCYTES NFR BLD AUTO: 0.3 % (ref 0–0.9)
INR PPP: 1.17 (ref 0.87–1.13)
LIPASE SERPL-CCNC: 23 U/L (ref 7–58)
LYMPHOCYTES # BLD AUTO: 1.89 K/UL (ref 1–4.8)
LYMPHOCYTES NFR BLD: 26.5 % (ref 22–41)
MCH RBC QN AUTO: 29.7 PG (ref 27–33)
MCHC RBC AUTO-ENTMCNC: 32.5 G/DL (ref 33.7–35.3)
MCV RBC AUTO: 91.2 FL (ref 81.4–97.8)
MONOCYTES # BLD AUTO: 0.49 K/UL (ref 0–0.85)
MONOCYTES NFR BLD AUTO: 6.9 % (ref 0–13.4)
NEUTROPHILS # BLD AUTO: 4.56 K/UL (ref 1.82–7.42)
NEUTROPHILS NFR BLD: 63.8 % (ref 44–72)
NRBC # BLD AUTO: 0 K/UL
NRBC BLD-RTO: 0 /100 WBC
PLATELET # BLD AUTO: 172 K/UL (ref 164–446)
PMV BLD AUTO: 10.7 FL (ref 9–12.9)
POTASSIUM SERPL-SCNC: 3.7 MMOL/L (ref 3.6–5.5)
PROT SERPL-MCNC: 6.1 G/DL (ref 6–8.2)
PROTHROMBIN TIME: 14.8 SEC (ref 12–14.6)
RBC # BLD AUTO: 4.55 M/UL (ref 4.7–6.1)
SODIUM SERPL-SCNC: 138 MMOL/L (ref 135–145)
TROPONIN I SERPL-MCNC: 0.02 NG/ML (ref 0–0.04)
TROPONIN I SERPL-MCNC: <0.02 NG/ML (ref 0–0.04)
WBC # BLD AUTO: 7.1 K/UL (ref 4.8–10.8)

## 2018-10-12 PROCEDURE — 99285 EMERGENCY DEPT VISIT HI MDM: CPT

## 2018-10-12 PROCEDURE — 36415 COLL VENOUS BLD VENIPUNCTURE: CPT

## 2018-10-12 PROCEDURE — 85025 COMPLETE CBC W/AUTO DIFF WBC: CPT

## 2018-10-12 PROCEDURE — 85730 THROMBOPLASTIN TIME PARTIAL: CPT

## 2018-10-12 PROCEDURE — 80053 COMPREHEN METABOLIC PANEL: CPT

## 2018-10-12 PROCEDURE — 93005 ELECTROCARDIOGRAM TRACING: CPT | Performed by: EMERGENCY MEDICINE

## 2018-10-12 PROCEDURE — 84484 ASSAY OF TROPONIN QUANT: CPT

## 2018-10-12 PROCEDURE — 83690 ASSAY OF LIPASE: CPT

## 2018-10-12 PROCEDURE — 85610 PROTHROMBIN TIME: CPT

## 2018-10-12 PROCEDURE — 71045 X-RAY EXAM CHEST 1 VIEW: CPT

## 2018-10-12 NOTE — ED NOTES
Pt with call light in reach and makenna in low position for pt safety. Pt's wife at bedside. Pt denies CP/SOB.

## 2018-10-12 NOTE — ED TRIAGE NOTES
Pt to ed c/o htn  Iv placed , labs drawn and taken to lab. poc update given to pt, results pending at this time

## 2018-10-12 NOTE — DISCHARGE INSTRUCTIONS
Continue your current medication  Return for chest pain or difficulty with breathing  Contact cardiology for further outpatient management next week

## 2018-10-12 NOTE — ED PROVIDER NOTES
"ED Provider Note    CHIEF COMPLAINT  No chief complaint on file.  With a fluttering in his chest    HPI  Vinod Conrad is a 73 y.o. male who presents the patient noticed this morning some fluttering in the left side of his chest.  He checked his blood pressure and it was elevated at 160 systolic.  The patient was concerned as he had a cardiac arrest here at [facility] emergency department on 15 August and subsequently had 2 stents placed due to ischemic disease.  The patient's been compliant with his medication.  He does not have any change in his breathing patterns.  He does not have any associated nausea nor vomiting.  He has not any diaphoresis.  The patient does not have any pain or swelling in his lower extremities.  He states that the fluttering seems to come and go with no known exacerbating or relieving factors.  It did occur while at rest.  REVIEW OF SYSTEMS  See HPI for further details. All other systems are negative.     PAST MEDICAL HISTORY  Past Medical History:   Diagnosis Date   • Allergy    • Anesthesia 2006    hallucinations post anesthesia, at home   • Arthritis     all joints   • CATARACT     bilateral IOL   • Hyperlipidemia    • Hypertension    • Infectious disease 2011    c diff   • Pain    • Unspecified hemorrhagic conditions 2006    \"abd bleeding\"       SOCIAL HISTORY  Social History     Social History   • Marital status:      Spouse name: N/A   • Number of children: N/A   • Years of education: N/A     Social History Main Topics   • Smoking status: Former Smoker     Packs/day: 2.00     Years: 30.00     Types: Cigarettes     Quit date: 1/1/1998   • Smokeless tobacco: Never Used   • Alcohol use 0.0 oz/week      Comment: 5 per week   • Drug use: No   • Sexual activity: Not on file     Other Topics Concern   • Not on file     Social History Narrative   • No narrative on file           PHYSICAL EXAM  VITAL SIGNS: BP (!) 190/92   Pulse 60   Temp 36.7 °C (98.1 °F)   Resp 16   Ht " "1.854 m (6' 1\")   Wt 91.5 kg (201 lb 11.5 oz)   SpO2 98%   BMI 26.61 kg/m²   Constitutional: Well developed, Well nourished, No acute distress, Non-toxic appearance.   HENT: Normocephalic, Atraumatic, tympanic membranes are intact and nonerythematous bilaterally, Oropharynx moist without exudates or erythema, Nose normal.   Eyes: PERRLA, EOMI, Conjunctiva normal.  Neck: Supple without meningismus  Lymphatic: No lymphadenopathy noted.   Cardiovascular: Normal heart rate, Normal rhythm, No murmurs, No rubs, No gallops.   Thorax & Lungs: Normal breath sounds, No respiratory distress, No wheezing, No chest tenderness.   Abdomen: Bowel sounds normal, Soft, No tenderness, no rebound, no guarding, no distention, No masses, No pulsatile masses.   Skin: Warm, Dry, No erythema, No rash.   Back: No tenderness, No CVA tenderness.   Extremities: Atraumatic with symmetric distal pulses, No edema, No tenderness, No cyanosis, No clubbing.   Neurologic: Alert & oriented x 3, cranial nerves II through XII are intact, Normal motor function, Normal sensory function, No focal deficits noted.   Psychiatric: Affect normal, Judgment normal, Mood normal.     EKG  Twelve-lead EKG interpreted by myself shows a sinus bradycardia with a ventricular rate of 59, normal QRS, ST segment depression laterally, T waves inverted throughout the precordial leads except for in V1.  The patient also has T wave inversion laterally in 1 and aVL.  Of note there is no dynamic changes from prior    Repeat twelve-lead EKG interpreted myself shows sinus bradycardia with a ventricular rate of 41, the patient does have occasional supraventricular extrasystoles, no ST segment elevation, continued slight ST segment depression laterally with the T wave changes anterior laterally noted on his prior EKG.  Overall no dynamic changes.    RADIOLOGY/PROCEDURES  DX-CHEST-PORTABLE (1 VIEW)   Final Result      No evidence of acute cardiopulmonary process.            COURSE " & MEDICAL DECISION MAKING  Pertinent Labs & Imaging studies reviewed. (See chart for details)  This a 73-year-old male who presents the emergency department with a fluttering sensation in his chest.  I spoke with cardiology as the patient did recently have a significant cardiac event.  They wanted the patient to have a repeat troponin EKG performed 2 hours after the initial one to help decrease the chance this is coming from an ischemic etiology.  The patient does have occasional premature ventricular contractions as well as premature atrial contractions and this may be the fluttering that the patient is experiencing.  He has not any chest pain nor is he had difficulty with breathing.  His troponin initially as well as the repeat troponin is negative and therefore ischemia would be very unlikely.  The patient does not have any metabolic derangements that could cause cardiac irritability.  He has been he medically stable but has had slight hypertension throughout his stay.  He will continue to check his blood pressure and he will follow-up with cardiologist next week for further outpatient management.  If the patient develops any difficulty with breathing, chest pressure, or further palpitations he will return for repeat examination.  The patient will be discharged home in an asymptomatic state in stable condition.    FINAL IMPRESSION  1.  Chest pain nonspecific   Disposition  The patient will be discharged in stable condition    Electronically signed by: Edgardo Jacobo, 10/12/2018 10:46 AM

## 2018-10-13 ENCOUNTER — PATIENT OUTREACH (OUTPATIENT)
Dept: HEALTH INFORMATION MANAGEMENT | Facility: OTHER | Age: 73
End: 2018-10-13

## 2018-10-13 NOTE — PROGRESS NOTES
"10/13/2018 1403 - Discharge Outreach - patient answered, but refused to answer questions to confirm patient identity. States he's \"doing fine\". Advised to contact his PCP for any questions or concerns.   "

## 2018-10-15 ENCOUNTER — TELEPHONE (OUTPATIENT)
Dept: MEDICAL GROUP | Facility: MEDICAL CENTER | Age: 73
End: 2018-10-15

## 2018-10-15 RX ORDER — OMEPRAZOLE 20 MG/1
20 CAPSULE, DELAYED RELEASE ORAL DAILY
Qty: 90 CAP | Status: CANCELLED | OUTPATIENT
Start: 2018-10-15

## 2018-10-15 NOTE — TELEPHONE ENCOUNTER
VOICEMAIL  1. Caller Name: Vinod                    Call Back Number: [phone]    2. Message: Patient reports recent BP as high as 164/80 and 155/80. He wants to know if this should be a concern or if it's normal with his health condition. Please advise.    3. Patient approves office to leave a detailed voicemail/MyChart message: N\A

## 2018-10-15 NOTE — TELEPHONE ENCOUNTER
Omeprazole    Was the patient seen in the last year in this department? Yes    Does patient have an active prescription for medications requested? Yes    Received Request Via: Patient

## 2018-10-16 ENCOUNTER — TELEPHONE (OUTPATIENT)
Dept: CARDIOLOGY | Facility: MEDICAL CENTER | Age: 73
End: 2018-10-16

## 2018-10-16 NOTE — TELEPHONE ENCOUNTER
Called pt to follow up. He notes that GI MD recommended for him to take Prilosec BID but his insurance wont cover it. Recommended that pt can buy medication OTC if needed or he can try to follow up with either his GI MD or his PCP for a prescription. Pt states understanding and is appreciative of assistance.     ----- Message from Rhiannon Rain sent at 10/16/2018 11:00 AM PDT -----  Regarding: question about medication  ABDIRAHMAN/Bushra    Patient has a few questions about his omeprazole medication. He can be reached at [phone].

## 2018-10-18 DIAGNOSIS — E78.5 DYSLIPIDEMIA: ICD-10-CM

## 2018-10-18 DIAGNOSIS — I10 ESSENTIAL HYPERTENSION: ICD-10-CM

## 2018-10-18 RX ORDER — TRIAMTERENE AND HYDROCHLOROTHIAZIDE 37.5; 25 MG/1; MG/1
1 TABLET ORAL DAILY
Qty: 45 TAB | Refills: 0 | Status: SHIPPED | OUTPATIENT
Start: 2018-10-18 | End: 2018-11-09

## 2018-10-18 RX ORDER — AMLODIPINE BESYLATE AND BENAZEPRIL HYDROCHLORIDE 5; 20 MG/1; MG/1
1 CAPSULE ORAL DAILY
Qty: 45 CAP | Refills: 0 | Status: SHIPPED | OUTPATIENT
Start: 2018-10-18 | End: 2018-11-09

## 2018-10-18 RX ORDER — ATORVASTATIN CALCIUM 20 MG/1
20 TABLET, FILM COATED ORAL DAILY
Qty: 45 TAB | Refills: 0 | Status: SHIPPED | OUTPATIENT
Start: 2018-10-18 | End: 2018-11-09

## 2018-11-07 PROBLEM — D50.0 IRON DEFICIENCY ANEMIA DUE TO CHRONIC BLOOD LOSS: Status: RESOLVED | Noted: 2018-09-24 | Resolved: 2018-11-07

## 2018-11-09 ENCOUNTER — OFFICE VISIT (OUTPATIENT)
Dept: CARDIOLOGY | Facility: MEDICAL CENTER | Age: 73
End: 2018-11-09
Payer: MEDICARE

## 2018-11-09 VITALS
HEIGHT: 73 IN | WEIGHT: 195 LBS | SYSTOLIC BLOOD PRESSURE: 160 MMHG | OXYGEN SATURATION: 96 % | HEART RATE: 60 BPM | DIASTOLIC BLOOD PRESSURE: 80 MMHG | BODY MASS INDEX: 25.84 KG/M2

## 2018-11-09 DIAGNOSIS — Z95.820 S/P ANGIOPLASTY WITH STENT: ICD-10-CM

## 2018-11-09 DIAGNOSIS — E78.5 DYSLIPIDEMIA: Chronic | ICD-10-CM

## 2018-11-09 DIAGNOSIS — R94.5 ABNORMAL LIVER FUNCTION: ICD-10-CM

## 2018-11-09 DIAGNOSIS — I21.02 ST ELEVATION MYOCARDIAL INFARCTION INVOLVING LEFT ANTERIOR DESCENDING (LAD) CORONARY ARTERY (HCC): ICD-10-CM

## 2018-11-09 DIAGNOSIS — I10 ESSENTIAL HYPERTENSION: Chronic | ICD-10-CM

## 2018-11-09 DIAGNOSIS — I25.10 CORONARY ARTERY DISEASE INVOLVING NATIVE CORONARY ARTERY OF NATIVE HEART WITHOUT ANGINA PECTORIS: ICD-10-CM

## 2018-11-09 DIAGNOSIS — I25.2 HISTORY OF MI (MYOCARDIAL INFARCTION): ICD-10-CM

## 2018-11-09 DIAGNOSIS — I47.20 VENTRICULAR TACHYCARDIA (HCC): ICD-10-CM

## 2018-11-09 DIAGNOSIS — N52.01 ERECTILE DYSFUNCTION DUE TO ARTERIAL INSUFFICIENCY: ICD-10-CM

## 2018-11-09 DIAGNOSIS — E11.9 CONTROLLED TYPE 2 DIABETES MELLITUS WITHOUT COMPLICATION, WITHOUT LONG-TERM CURRENT USE OF INSULIN (HCC): Chronic | ICD-10-CM

## 2018-11-09 PROCEDURE — 99214 OFFICE O/P EST MOD 30 MIN: CPT | Performed by: INTERNAL MEDICINE

## 2018-11-09 RX ORDER — ACETAMINOPHEN 325 MG/1
650 TABLET ORAL EVERY 4 HOURS PRN
COMMUNITY
End: 2020-01-01

## 2018-11-09 RX ORDER — NITROGLYCERIN 0.4 MG/1
0.4 TABLET SUBLINGUAL PRN
Qty: 25 TAB | Refills: 11 | Status: SHIPPED | OUTPATIENT
Start: 2018-11-09

## 2018-11-09 ASSESSMENT — ENCOUNTER SYMPTOMS
BRUISES/BLEEDS EASILY: 1
BACK PAIN: 1

## 2018-11-09 NOTE — PROGRESS NOTES
Subjective:   Chief Complaint:   Chief Complaint   Patient presents with   • HTN (Controlled)   • Coronary Artery Disease       Vinod Conrad is a 73 y.o. male who returns for further management of coronary artery disease.  He had an anterior ST elevation MI August 2018 and had a stent placed to the LAD. He rides an exercise bike every day.  Was having arm and shoulder pain 2 days prior to presentation, finally brought to the ED, had cardiac arrest in the ED, went to the lab in arrest, had stent to LAD.  Came home, had dark stools a few days later, ended up with GI bleeding, back to the hospital, had ulcers that were clipped and started on PPI. No recurrence of VT during his stay or since.    He had some minimal disease in the RCA but otherwise patent vessels, EF was 45% in the Cath Lab. He was back in the emergency room 10/12/2018 with fluttering in his chest but all of his evaluation was reassuring.    He is maintained on aspirin, high-dose Lipitor, Effient, metoprolol and losartan. No more bleeding.  Is about to go to PPI once daily.    He is not limited by chest pain, pressure or tightness. No significant dyspnea on exertion, orthopnea or lower extremity swelling. No significant palpitations, dizziness, or presyncope/syncope. No symptoms of leg claudication. Angela pains, that are brief.    LDL was 51 prior to medications. He is nervous about symptoms, discussed use of nitro today.    DATA REVIEWED by me:  ECG 10/12/2018  Sinus bradycardia, rate 41, anterior and lateral T wave inversion    Echo 8/17/2018  Mild LVH, EF 45%, akinesis of the distal septum and apex, no significant valve disease, RVSP 33 mmHg above right atrial pressure    Left heart catheterization 8/15/2018  Proximal 100% LAD occlusion, status post Zions 3.0 x 38 mm drug-eluting stent, left main free of disease, circumflex normal, proximal mid RCA with diffuse calcified atheromatous disease but patent, EF 45% with anterior apical and inferior  "apical wall motion abnormality    Chest x-ray 10/12/2018  No acute process    Most recent labs:     10/12/2018  Hemoglobin 13.5, platelet 172, sodium 138, potassium 3.7, creatinine 0.83, LFTs normal, troponin normal    8-16-18  , , HDL 55, LDL 51    Past Medical History:   Diagnosis Date   • Allergy    • Anesthesia 2006    hallucinations post anesthesia, at home   • Arthritis     all joints   • CATARACT     bilateral IOL   • Hyperlipidemia    • Hypertension    • Infectious disease 2011    c diff   • Pain    • Unspecified hemorrhagic conditions 2006    \"abd bleeding\"     Past Surgical History:   Procedure Laterality Date   • GASTROSCOPY N/A 8/23/2018    Procedure: GASTROSCOPY;  Surgeon: Amari Juárez M.D.;  Location: SURGERY [facility];  Service: Gastroenterology   • CERVICAL FORAMINOTOMY  5/20/2013    Performed by Krish Yang M.D. at SURGERY [facility]   • OTHER  2010    torn retina RIGHT EYE   • SCLERAL BUCKLING  1/13/2009    Performed by YVETTE DRAPER at SURGERY SAME DAY [facility]S   • OTHER ORTHOPEDIC SURGERY  2005    l knee     History reviewed. No pertinent family history.  Social History     Social History   • Marital status:      Spouse name: N/A   • Number of children: N/A   • Years of education: N/A     Occupational History   • Not on file.     Social History Main Topics   • Smoking status: Former Smoker     Packs/day: 2.00     Years: 30.00     Types: Cigarettes     Quit date: 1/1/1998   • Smokeless tobacco: Never Used   • Alcohol use 0.0 oz/week      Comment: 5 per week   • Drug use: No   • Sexual activity: Not on file     Other Topics Concern   • Not on file     Social History Narrative   • No narrative on file     Allergies   Allergen Reactions   • Aspirin Anaphylaxis     INTERNAL BLEEDING   • Augmentin Vomiting   • Clindamycin      Develop c.diff   • Metronidazole      Not sure   • Nsaids Unspecified     GI bleed   • Vancomycin Swelling   • Ace Inhibitors Cough " "      Current Outpatient Prescriptions   Medication Sig Dispense Refill   • acetaminophen (TYLENOL) 325 MG Tab Take 650 mg by mouth every four hours as needed.     • nitroglycerin (NITROSTAT) 0.4 MG SL Tab Place 1 Tab under tongue as needed for Chest Pain. Up to 3 every five minutes 25 Tab 11   • omeprazole (PRILOSEC) 20 MG delayed-release capsule Take 1 Cap by mouth every day. 90 Cap 3   • losartan (COZAAR) 25 MG Tab Take 1 Tab by mouth every day. 90 Tab 3   • aspirin EC 81 MG EC tablet Take 1 Tab by mouth every day. 30 Tab 3   • atorvastatin (LIPITOR) 80 MG tablet Take 1 Tab by mouth every bedtime. 30 Tab 3   • metoprolol (LOPRESSOR) 25 MG Tab Take 1 Tab by mouth 2 Times a Day. 60 Tab 3   • prasugrel (EFFIENT) 10 MG Tab Take 1 Tab by mouth every day. 30 Tab 3     No current facility-administered medications for this visit.        Review of Systems   HENT: Positive for tinnitus.    Musculoskeletal: Positive for back pain and joint pain.   Endo/Heme/Allergies: Bruises/bleeds easily.     All others systems reviewed and negative.     Objective:     Blood pressure 160/80, pulse 60, height 1.854 m (6' 1\"), weight 88.5 kg (195 lb), SpO2 96 %. Body mass index is 25.73 kg/m².    Physical Exam   General: No acute distress. Well nourished.  HEENT: EOM grossly intact, no scleral icterus, no pharyngeal erythema.   Neck:  No JVD, no bruits, trachea midline  CVS: RRR. Normal S1, S2. No M/R/G. No LE edema.  2+ radial pulses, 2+ DP pulses  Resp: CTAB. No wheezing or crackles/rhonchi. Normal respiratory effort.  Abdomen: Soft, NT, no aneudy hepatomegaly, well healed midline incision, calcified xyphoid process.  MSK/Ext: No clubbing or cyanosis.  Skin: Warm and dry, no rashes.  Neurological: CN III-XII grossly intact. No focal deficits.   Psych: A&O x 3, appropriate affect, good judgement      Assessment:     1. Coronary artery disease involving native coronary artery of native heart without angina pectoris  Lipid Profile   2. S/P " angioplasty with stent     3. ST elevation myocardial infarction involving left anterior descending (LAD) coronary artery (HCC)     4. Abnormal liver function     5. Erectile dysfunction due to arterial insufficiency     6. History of MI (myocardial infarction)     7. Essential hypertension     8. Dyslipidemia     9. Controlled type 2 diabetes mellitus without complication, without long-term current use of insulin (HCC)     10. Ventricular tachycardia (HCC)         Medical Decision Making:  Today's Assessment / Status / Plan:     -He declines cardiac rehab  -OK to go to PPI once daily  -Cont DAPT for 1 year, consider only 6 months if issues  -Instructed how to use nitro  -Repeat lipids at his convenience  -No recurrence of VT.      Return in about 3 months (around 2/9/2019).    It is my pleasure to participate in the care of Mr. Conrad.  Please do not hesitate to contact me with questions or concerns.    Sil Jacobo MD, [facility]  Cardiologist [facility]    Please note that this dictation was created using voice recognition software. I have made every reasonable attempt to correct obvious errors, but it is possible there are errors of grammar and possibly content that I did not discover before finalizing the note.

## 2018-11-09 NOTE — LETTER
[facility]   [address]   [address]   [place] [address]  Phone: [phone]  Fax: [phone]              Vinod Conrad  1945    Encounter Date: 11/9/2018    Sil Jacobo M.D.          PROGRESS NOTE:  Subjective:   Chief Complaint:   Chief Complaint   Patient presents with   • HTN (Controlled)   • Coronary Artery Disease       Vinod Conrad is a 73 y.o. male who returns for further management of coronary artery disease.  He had an anterior ST elevation MI August 2018 and had a stent placed to the LAD. He rides an exercise bike every day.  Was having arm and shoulder pain 2 days prior to presentation, finally brought to the ED, had cardiac arrest in the ED, went to the lab in arrest, had stent to LAD.  Came home, had dark stools a few days later, ended up with GI bleeding, back to the hospital, had ulcers that were clipped and started on PPI. No recurrence of VT during his stay or since.    He had some minimal disease in the RCA but otherwise patent vessels, EF was 45% in the Cath Lab. He was back in the emergency room 10/12/2018 with fluttering in his chest but all of his evaluation was reassuring.    He is maintained on aspirin, high-dose Lipitor, Effient, metoprolol and losartan. No more bleeding.  Is about to go to PPI once daily.    He is not limited by chest pain, pressure or tightness. No significant dyspnea on exertion, orthopnea or lower extremity swelling. No significant palpitations, dizziness, or presyncope/syncope. No symptoms of leg claudication. Angela pains, that are brief.    LDL was 51 prior to medications. He is nervous about symptoms, discussed use of nitro today.    DATA REVIEWED by me:  ECG 10/12/2018  Sinus bradycardia, rate 41, anterior and lateral T wave inversion    Echo 8/17/2018  Mild LVH, EF 45%, akinesis of the distal septum and apex, no significant valve disease, RVSP 33 mmHg above right  "atrial pressure    Left heart catheterization 8/15/2018  Proximal 100% LAD occlusion, status post Zions 3.0 x 38 mm drug-eluting stent, left main free of disease, circumflex normal, proximal mid RCA with diffuse calcified atheromatous disease but patent, EF 45% with anterior apical and inferior apical wall motion abnormality    Chest x-ray 10/12/2018  No acute process    Most recent labs:     10/12/2018  Hemoglobin 13.5, platelet 172, sodium 138, potassium 3.7, creatinine 0.83, LFTs normal, troponin normal    8-16-18  , , HDL 55, LDL 51    Past Medical History:   Diagnosis Date   • Allergy    • Anesthesia 2006    hallucinations post anesthesia, at home   • Arthritis     all joints   • CATARACT     bilateral IOL   • Hyperlipidemia    • Hypertension    • Infectious disease 2011    c diff   • Pain    • Unspecified hemorrhagic conditions 2006    \"abd bleeding\"     Past Surgical History:   Procedure Laterality Date   • GASTROSCOPY N/A 8/23/2018    Procedure: GASTROSCOPY;  Surgeon: Amari Juárez M.D.;  Location: SURGERY [facility];  Service: Gastroenterology   • CERVICAL FORAMINOTOMY  5/20/2013    Performed by Krish Yang M.D. at SURGERY [facility]   • OTHER  2010    torn retina RIGHT EYE   • SCLERAL BUCKLING  1/13/2009    Performed by YVETTE DRAPER at SURGERY SAME DAY [facility]   • OTHER ORTHOPEDIC SURGERY  2005    l knee     History reviewed. No pertinent family history.  Social History     Social History   • Marital status:      Spouse name: N/A   • Number of children: N/A   • Years of education: N/A     Occupational History   • Not on file.     Social History Main Topics   • Smoking status: Former Smoker     Packs/day: 2.00     Years: 30.00     Types: Cigarettes     Quit date: 1/1/1998   • Smokeless tobacco: Never Used   • Alcohol use 0.0 oz/week      Comment: 5 per week   • Drug use: No   • Sexual activity: Not on file     Other Topics Concern   • Not on file     Social " "History Narrative   • No narrative on file     Allergies   Allergen Reactions   • Aspirin Anaphylaxis     INTERNAL BLEEDING   • Augmentin Vomiting   • Clindamycin      Develop c.diff   • Metronidazole      Not sure   • Nsaids Unspecified     GI bleed   • Vancomycin Swelling   • Ace Inhibitors Cough       Current Outpatient Prescriptions   Medication Sig Dispense Refill   • acetaminophen (TYLENOL) 325 MG Tab Take 650 mg by mouth every four hours as needed.     • nitroglycerin (NITROSTAT) 0.4 MG SL Tab Place 1 Tab under tongue as needed for Chest Pain. Up to 3 every five minutes 25 Tab 11   • omeprazole (PRILOSEC) 20 MG delayed-release capsule Take 1 Cap by mouth every day. 90 Cap 3   • losartan (COZAAR) 25 MG Tab Take 1 Tab by mouth every day. 90 Tab 3   • aspirin EC 81 MG EC tablet Take 1 Tab by mouth every day. 30 Tab 3   • atorvastatin (LIPITOR) 80 MG tablet Take 1 Tab by mouth every bedtime. 30 Tab 3   • metoprolol (LOPRESSOR) 25 MG Tab Take 1 Tab by mouth 2 Times a Day. 60 Tab 3   • prasugrel (EFFIENT) 10 MG Tab Take 1 Tab by mouth every day. 30 Tab 3     No current facility-administered medications for this visit.        Review of Systems   HENT: Positive for tinnitus.    Musculoskeletal: Positive for back pain and joint pain.   Endo/Heme/Allergies: Bruises/bleeds easily.     All others systems reviewed and negative.     Objective:     Blood pressure 160/80, pulse 60, height 1.854 m (6' 1\"), weight 88.5 kg (195 lb), SpO2 96 %. Body mass index is 25.73 kg/m².    Physical Exam   General: No acute distress. Well nourished.  HEENT: EOM grossly intact, no scleral icterus, no pharyngeal erythema.   Neck:  No JVD, no bruits, trachea midline  CVS: RRR. Normal S1, S2. No M/R/G. No LE edema.  2+ radial pulses, 2+ DP pulses  Resp: CTAB. No wheezing or crackles/rhonchi. Normal respiratory effort.  Abdomen: Soft, NT, no aneudy hepatomegaly, well healed midline incision, calcified xyphoid process.  MSK/Ext: No clubbing or " cyanosis.  Skin: Warm and dry, no rashes.  Neurological: CN III-XII grossly intact. No focal deficits.   Psych: A&O x 3, appropriate affect, good judgement      Assessment:     1. Coronary artery disease involving native coronary artery of native heart without angina pectoris  Lipid Profile   2. S/P angioplasty with stent     3. ST elevation myocardial infarction involving left anterior descending (LAD) coronary artery (HCC)     4. Abnormal liver function     5. Erectile dysfunction due to arterial insufficiency     6. History of MI (myocardial infarction)     7. Essential hypertension     8. Dyslipidemia     9. Controlled type 2 diabetes mellitus without complication, without long-term current use of insulin (HCC)     10. Ventricular tachycardia (HCC)         Medical Decision Making:  Today's Assessment / Status / Plan:     -He declines cardiac rehab  -OK to go to PPI once daily  -Cont DAPT for 1 year, consider only 6 months if issues  -Instructed how to use nitro  -Repeat lipids at his convenience  -No recurrence of VT.      Return in about 3 months (around 2/9/2019).    It is my pleasure to participate in the care of Mr. Conrad.  Please do not hesitate to contact me with questions or concerns.    Sil Jacobo MD, [facility]  Cardiologist [facility]    Please note that this dictation was created using voice recognition software. I have made every reasonable attempt to correct obvious errors, but it is possible there are errors of grammar and possibly content that I did not discover before finalizing the note.      Jamal Nieves M.D.  [address]  [id number]  [place] [id number]  VIA In Basket

## 2018-11-19 DIAGNOSIS — I25.10 CORONARY ARTERY DISEASE INVOLVING NATIVE CORONARY ARTERY OF NATIVE HEART WITHOUT ANGINA PECTORIS: Primary | ICD-10-CM

## 2018-11-19 RX ORDER — PRASUGREL 10 MG/1
10 TABLET, FILM COATED ORAL DAILY
Qty: 90 TAB | Refills: 3 | Status: SHIPPED | OUTPATIENT
Start: 2018-11-19 | End: 2019-07-22

## 2018-12-03 ENCOUNTER — OFFICE VISIT (OUTPATIENT)
Dept: MEDICAL GROUP | Facility: MEDICAL CENTER | Age: 73
End: 2018-12-03
Payer: MEDICARE

## 2018-12-03 VITALS
HEART RATE: 54 BPM | DIASTOLIC BLOOD PRESSURE: 78 MMHG | TEMPERATURE: 97.7 F | BODY MASS INDEX: 26.11 KG/M2 | OXYGEN SATURATION: 99 % | SYSTOLIC BLOOD PRESSURE: 140 MMHG | WEIGHT: 197 LBS | HEIGHT: 73 IN

## 2018-12-03 DIAGNOSIS — I10 ESSENTIAL HYPERTENSION: Chronic | ICD-10-CM

## 2018-12-03 DIAGNOSIS — J00 ACUTE NASOPHARYNGITIS: ICD-10-CM

## 2018-12-03 DIAGNOSIS — E11.9 CONTROLLED TYPE 2 DIABETES MELLITUS WITHOUT COMPLICATION, WITHOUT LONG-TERM CURRENT USE OF INSULIN (HCC): Chronic | ICD-10-CM

## 2018-12-03 PROCEDURE — 99214 OFFICE O/P EST MOD 30 MIN: CPT | Performed by: FAMILY MEDICINE

## 2018-12-03 RX ORDER — IPRATROPIUM BROMIDE 42 UG/1
2 SPRAY, METERED NASAL 3 TIMES DAILY PRN
Qty: 1 BOTTLE | Refills: 2 | Status: SHIPPED | OUTPATIENT
Start: 2018-12-03 | End: 2018-12-26 | Stop reason: SDUPTHER

## 2018-12-04 ENCOUNTER — TELEPHONE (OUTPATIENT)
Dept: CARDIOLOGY | Facility: MEDICAL CENTER | Age: 73
End: 2018-12-04

## 2018-12-04 ENCOUNTER — TELEPHONE (OUTPATIENT)
Dept: MEDICAL GROUP | Facility: MEDICAL CENTER | Age: 73
End: 2018-12-04

## 2018-12-04 RX ORDER — OMEPRAZOLE 20 MG/1
20 CAPSULE, DELAYED RELEASE ORAL DAILY
Qty: 90 CAP | Refills: 3 | Status: SHIPPED | OUTPATIENT
Start: 2018-12-04 | End: 2020-02-27

## 2018-12-04 NOTE — TELEPHONE ENCOUNTER
Patient requesting rx for Omeprazole 20mg, it helps him counteract the Aspirin that he has to take. Please advise.    Was the patient seen in the last year in this department? Yes    Does patient have an active prescription for medications requested? No     Received Request Via: Pharmacy

## 2018-12-04 NOTE — TELEPHONE ENCOUNTER
Sil Jacobo M.D.  Mona Rojas R.N.             EKG right some kind of quick note that states he can have dental procedure, he does not need antibiotics, he must stay on aspirin and Effient.   If the dentist does not want to do this on aspirin and Effient and the patient needs a new dentist.  There are plenty that we will do this.   Thanks for your help!   LS    Previous Messages      ----- Message -----   From: Mona Rojas R.N.   Sent: 12/4/2018   9:10 AM   To: Sil Jacobo M.D.   Subject: FW: DENTAL CLEARANCE                             S/p STEMI, cardiac arrest, and stent placement Aug 2018. Pt is on Effient and ASA. Having routine dental cleaning next month. Dentist requires clearance and anticoagulant instructions, if any.     Thanks!     ----- Message -----   From: David Peralta Ass't   Sent: 12/3/2018   4:31 PM   To: Mona Rojas R.N.   Subject: DENTAL CLEARANCE                                 Dae Dunn,     This patient walked in this afternoon, stated dentist told him to get a dental clearance for his visit next month. Thank You!     Maribeth         Letter faxed to [facility] ([phone])--see Letters tab.

## 2018-12-04 NOTE — PROGRESS NOTES
"Subjective:   Vinod Conrad is a 73 y.o. male here today for URI    1 week of sore throat, cough, rhinorrhea worse at night. Gets better during the day. No shortness of breath, no heart palpitations. No known sick contacts.  Had an MI in August with 2 stents.    Patient has treated diabetes with diet and exercise. He has not had follow up A1c in over 1 year.    Current medicines (including changes today)  Current Outpatient Prescriptions   Medication Sig Dispense Refill   • ipratropium (ATROVENT) 0.06 % Solution Spray 2 Sprays in nose 3 times a day as needed (Rhinorrhea). 1 Bottle 2   • prasugrel (EFFIENT) 10 MG Tab Take 1 Tab by mouth every day. 90 Tab 3   • acetaminophen (TYLENOL) 325 MG Tab Take 650 mg by mouth every four hours as needed.     • nitroglycerin (NITROSTAT) 0.4 MG SL Tab Place 1 Tab under tongue as needed for Chest Pain. Up to 3 every five minutes 25 Tab 11   • omeprazole (PRILOSEC) 20 MG delayed-release capsule Take 1 Cap by mouth every day. 90 Cap 3   • losartan (COZAAR) 25 MG Tab Take 1 Tab by mouth every day. 90 Tab 3   • aspirin EC 81 MG EC tablet Take 1 Tab by mouth every day. 30 Tab 3   • atorvastatin (LIPITOR) 80 MG tablet Take 1 Tab by mouth every bedtime. 30 Tab 3   • metoprolol (LOPRESSOR) 25 MG Tab Take 1 Tab by mouth 2 Times a Day. 60 Tab 3     No current facility-administered medications for this visit.      He  has a past medical history of Allergy; Anesthesia (2006); Arthritis; CATARACT; Hyperlipidemia; Hypertension; Infectious disease (2011); Pain; and Unspecified hemorrhagic conditions (2006). He also has no past medical history of ASTHMA.    ROS   No chest pain, no shortness of breath       Objective:     Blood pressure 140/78, pulse (!) 54, temperature 36.5 °C (97.7 °F), height 1.854 m (6' 1\"), weight 89.4 kg (197 lb), SpO2 99 %. Body mass index is 25.99 kg/m².   Physical Exam:  Constitutional: Alert, no distress.  Skin: Warm, dry, good turgor, no rashes in visible " areas.  Eye: Equal, round and reactive, conjunctiva clear, lids normal.  ENMT: Lips without lesions, good dentition, oropharynx clear. Thin clear nasal drainage bilateral with edematous turbinates.  Neck: Trachea midline, no masses, no thyromegaly. No cervical or supraclavicular lymphadenopathy  Respiratory: Unlabored respiratory effort, lungs clear to auscultation, no wheezes, no ronchi.  Cardiovascular: Normal S1, S2, no murmur, no edema.  Psych: Alert and oriented x3, normal affect and mood.        Assessment and Plan:   The following treatment plan was discussed    1. Acute nasopharyngitis  Most likely viral URI.  Trial of Atrovent nasal to help with symptoms.  Advised nasal strips to help with snoring.  Reassurance given.  - ipratropium (ATROVENT) 0.06 % Solution; Spray 2 Sprays in nose 3 times a day as needed (Rhinorrhea).  Dispense: 1 Bottle; Refill: 2    2. Controlled type 2 diabetes mellitus without complication, without long-term current use of insulin ([facility])  Check labs and follow-up in 2 months.  - HEMOGLOBIN A1C; Future  - MICROALBUMIN CREAT RATIO URINE; Future  - COMP METABOLIC PANEL; Future    3. Essential hypertension  Controlled.  Continue current medication.  - CBC WITH DIFFERENTIAL; Future      Followup: Return in about 2 months (around 2/3/2019) for Labs.

## 2018-12-10 DIAGNOSIS — I10 ESSENTIAL HYPERTENSION: ICD-10-CM

## 2018-12-10 DIAGNOSIS — E78.5 DYSLIPIDEMIA: ICD-10-CM

## 2018-12-10 RX ORDER — ATORVASTATIN CALCIUM 80 MG/1
80 TABLET, FILM COATED ORAL
Qty: 90 TAB | Refills: 3 | Status: SHIPPED | OUTPATIENT
Start: 2018-12-10 | End: 2019-12-02 | Stop reason: SDUPTHER

## 2018-12-26 DIAGNOSIS — J00 ACUTE NASOPHARYNGITIS: ICD-10-CM

## 2018-12-26 RX ORDER — IPRATROPIUM BROMIDE 42 UG/1
2 SPRAY, METERED NASAL 3 TIMES DAILY PRN
Qty: 3 BOTTLE | Refills: 3 | Status: SHIPPED | OUTPATIENT
Start: 2018-12-26 | End: 2019-03-14 | Stop reason: SDUPTHER

## 2019-01-24 ENCOUNTER — HOSPITAL ENCOUNTER (OUTPATIENT)
Dept: LAB | Facility: MEDICAL CENTER | Age: 74
End: 2019-01-24
Attending: FAMILY MEDICINE
Payer: MEDICARE

## 2019-01-24 ENCOUNTER — HOSPITAL ENCOUNTER (OUTPATIENT)
Dept: LAB | Facility: MEDICAL CENTER | Age: 74
End: 2019-01-24
Attending: INTERNAL MEDICINE
Payer: MEDICARE

## 2019-01-24 DIAGNOSIS — E11.9 CONTROLLED TYPE 2 DIABETES MELLITUS WITHOUT COMPLICATION, WITHOUT LONG-TERM CURRENT USE OF INSULIN (HCC): Chronic | ICD-10-CM

## 2019-01-24 DIAGNOSIS — I25.10 CORONARY ARTERY DISEASE INVOLVING NATIVE CORONARY ARTERY OF NATIVE HEART WITHOUT ANGINA PECTORIS: ICD-10-CM

## 2019-01-24 DIAGNOSIS — I10 ESSENTIAL HYPERTENSION: Chronic | ICD-10-CM

## 2019-01-24 LAB
ALBUMIN SERPL BCP-MCNC: 4.1 G/DL (ref 3.2–4.9)
ALBUMIN/GLOB SERPL: 1.5 G/DL
ALP SERPL-CCNC: 74 U/L (ref 30–99)
ALT SERPL-CCNC: 22 U/L (ref 2–50)
ANION GAP SERPL CALC-SCNC: 9 MMOL/L (ref 0–11.9)
AST SERPL-CCNC: 21 U/L (ref 12–45)
BASOPHILS # BLD AUTO: 0.8 % (ref 0–1.8)
BASOPHILS # BLD: 0.06 K/UL (ref 0–0.12)
BILIRUB SERPL-MCNC: 0.9 MG/DL (ref 0.1–1.5)
BUN SERPL-MCNC: 22 MG/DL (ref 8–22)
CALCIUM SERPL-MCNC: 9.4 MG/DL (ref 8.5–10.5)
CHLORIDE SERPL-SCNC: 104 MMOL/L (ref 96–112)
CHOLEST SERPL-MCNC: 123 MG/DL (ref 100–199)
CO2 SERPL-SCNC: 26 MMOL/L (ref 20–33)
CREAT SERPL-MCNC: 0.75 MG/DL (ref 0.5–1.4)
CREAT UR-MCNC: 192.9 MG/DL
EOSINOPHIL # BLD AUTO: 0.2 K/UL (ref 0–0.51)
EOSINOPHIL NFR BLD: 2.7 % (ref 0–6.9)
ERYTHROCYTE [DISTWIDTH] IN BLOOD BY AUTOMATED COUNT: 47.6 FL (ref 35.9–50)
EST. AVERAGE GLUCOSE BLD GHB EST-MCNC: 143 MG/DL
FASTING STATUS PATIENT QL REPORTED: NORMAL
FASTING STATUS PATIENT QL REPORTED: NORMAL
GLOBULIN SER CALC-MCNC: 2.7 G/DL (ref 1.9–3.5)
GLUCOSE SERPL-MCNC: 128 MG/DL (ref 65–99)
HBA1C MFR BLD: 6.6 % (ref 0–5.6)
HCT VFR BLD AUTO: 48.1 % (ref 42–52)
HDLC SERPL-MCNC: 51 MG/DL
HGB BLD-MCNC: 15.6 G/DL (ref 14–18)
IMM GRANULOCYTES # BLD AUTO: 0.02 K/UL (ref 0–0.11)
IMM GRANULOCYTES NFR BLD AUTO: 0.3 % (ref 0–0.9)
LDLC SERPL CALC-MCNC: 54 MG/DL
LYMPHOCYTES # BLD AUTO: 2.24 K/UL (ref 1–4.8)
LYMPHOCYTES NFR BLD: 29.8 % (ref 22–41)
MCH RBC QN AUTO: 29.4 PG (ref 27–33)
MCHC RBC AUTO-ENTMCNC: 32.4 G/DL (ref 33.7–35.3)
MCV RBC AUTO: 90.8 FL (ref 81.4–97.8)
MICROALBUMIN UR-MCNC: 4.1 MG/DL
MICROALBUMIN/CREAT UR: 21 MG/G (ref 0–30)
MONOCYTES # BLD AUTO: 0.64 K/UL (ref 0–0.85)
MONOCYTES NFR BLD AUTO: 8.5 % (ref 0–13.4)
NEUTROPHILS # BLD AUTO: 4.36 K/UL (ref 1.82–7.42)
NEUTROPHILS NFR BLD: 57.9 % (ref 44–72)
NRBC # BLD AUTO: 0 K/UL
NRBC BLD-RTO: 0 /100 WBC
PLATELET # BLD AUTO: 201 K/UL (ref 164–446)
PMV BLD AUTO: 10.6 FL (ref 9–12.9)
POTASSIUM SERPL-SCNC: 4.3 MMOL/L (ref 3.6–5.5)
PROT SERPL-MCNC: 6.8 G/DL (ref 6–8.2)
RBC # BLD AUTO: 5.3 M/UL (ref 4.7–6.1)
SODIUM SERPL-SCNC: 139 MMOL/L (ref 135–145)
TRIGL SERPL-MCNC: 88 MG/DL (ref 0–149)
WBC # BLD AUTO: 7.5 K/UL (ref 4.8–10.8)

## 2019-01-24 PROCEDURE — 82043 UR ALBUMIN QUANTITATIVE: CPT

## 2019-01-24 PROCEDURE — 80053 COMPREHEN METABOLIC PANEL: CPT

## 2019-01-24 PROCEDURE — 85025 COMPLETE CBC W/AUTO DIFF WBC: CPT

## 2019-01-24 PROCEDURE — 82570 ASSAY OF URINE CREATININE: CPT

## 2019-01-24 PROCEDURE — 36415 COLL VENOUS BLD VENIPUNCTURE: CPT | Mod: GA

## 2019-01-24 PROCEDURE — 80061 LIPID PANEL: CPT

## 2019-01-24 PROCEDURE — 83036 HEMOGLOBIN GLYCOSYLATED A1C: CPT | Mod: GA

## 2019-01-25 ENCOUNTER — TELEPHONE (OUTPATIENT)
Dept: CARDIOLOGY | Facility: MEDICAL CENTER | Age: 74
End: 2019-01-25

## 2019-02-01 ENCOUNTER — OFFICE VISIT (OUTPATIENT)
Dept: CARDIOLOGY | Facility: MEDICAL CENTER | Age: 74
End: 2019-02-01
Payer: MEDICARE

## 2019-02-01 VITALS
WEIGHT: 195 LBS | SYSTOLIC BLOOD PRESSURE: 150 MMHG | OXYGEN SATURATION: 91 % | BODY MASS INDEX: 25.84 KG/M2 | HEIGHT: 73 IN | HEART RATE: 54 BPM | DIASTOLIC BLOOD PRESSURE: 90 MMHG

## 2019-02-01 DIAGNOSIS — I10 ESSENTIAL HYPERTENSION: ICD-10-CM

## 2019-02-01 DIAGNOSIS — E11.9 CONTROLLED TYPE 2 DIABETES MELLITUS WITHOUT COMPLICATION, WITHOUT LONG-TERM CURRENT USE OF INSULIN (HCC): ICD-10-CM

## 2019-02-01 DIAGNOSIS — I25.10 CORONARY ARTERY DISEASE INVOLVING NATIVE CORONARY ARTERY OF NATIVE HEART WITHOUT ANGINA PECTORIS: ICD-10-CM

## 2019-02-01 DIAGNOSIS — Z95.820 S/P ANGIOPLASTY WITH STENT: ICD-10-CM

## 2019-02-01 DIAGNOSIS — I46.9 CARDIAC ARREST (HCC): ICD-10-CM

## 2019-02-01 DIAGNOSIS — N52.01 ERECTILE DYSFUNCTION DUE TO ARTERIAL INSUFFICIENCY: ICD-10-CM

## 2019-02-01 DIAGNOSIS — R94.5 ABNORMAL LIVER FUNCTION: ICD-10-CM

## 2019-02-01 DIAGNOSIS — I47.20 VENTRICULAR TACHYCARDIA (HCC): ICD-10-CM

## 2019-02-01 DIAGNOSIS — I25.2 HISTORY OF MI (MYOCARDIAL INFARCTION): ICD-10-CM

## 2019-02-01 DIAGNOSIS — R73.01 IFG (IMPAIRED FASTING GLUCOSE): ICD-10-CM

## 2019-02-01 DIAGNOSIS — E78.5 DYSLIPIDEMIA: ICD-10-CM

## 2019-02-01 DIAGNOSIS — D50.0 IRON DEFICIENCY ANEMIA DUE TO CHRONIC BLOOD LOSS: ICD-10-CM

## 2019-02-01 PROCEDURE — 99214 OFFICE O/P EST MOD 30 MIN: CPT | Performed by: INTERNAL MEDICINE

## 2019-02-01 RX ORDER — LOSARTAN POTASSIUM 50 MG/1
50 TABLET ORAL DAILY
Qty: 90 TAB | Refills: 3 | Status: SHIPPED | OUTPATIENT
Start: 2019-02-01 | End: 2019-08-21 | Stop reason: SDUPTHER

## 2019-02-01 ASSESSMENT — ENCOUNTER SYMPTOMS
BACK PAIN: 1
BRUISES/BLEEDS EASILY: 1

## 2019-02-01 NOTE — PROGRESS NOTES
Subjective:   Chief Complaint:   Chief Complaint   Patient presents with   • Coronary Artery Disease       Vinod Conrad is a 73 y.o. male who returns for further management of coronary artery disease.  He had an anterior ST elevation MI August 15, 2018 and had a stent placed to the LAD. Was having arm and shoulder pain 2 days prior to presentation, finally brought to the ED, had cardiac arrest in the ED, went to the lab in arrest, had stent to LAD.  Came home, had dark stools a few days later, ended up with GI bleeding, back to the hospital, had ulcers that were clipped and started on PPI. No recurrence of VT during his stay or since.    He had some minimal disease in the RCA but otherwise patent vessels, EF was 45% in the Cath Lab. He was back in the emergency room 10/12/2018 with fluttering in his chest but all of his evaluation was reassuring.    He is maintained on aspirin, high-dose Lipitor, Effient, metoprolol and losartan. No more bleeding.  Is about to go to PPI once daily.    He rides an exercise bike every day, 5-6 miles, stationary, recumbant.  He is not limited by chest pain, pressure or tightness. No significant dyspnea on exertion, orthopnea or lower extremity swelling. No significant palpitations, dizziness, or presyncope/syncope. No symptoms of leg claudication. Angela pains, that are brief.    LDL was 54, on statin.   His BP was elevated, thinks it's related to wife's health issues, having severe migraines.  No issues with bleeding on DAPT.    DATA REVIEWED by me:  ECG 10/12/2018  Sinus bradycardia, rate 41, anterior and lateral T wave inversion    Echo 8/17/2018  Mild LVH, EF 45%, akinesis of the distal septum and apex, no significant valve disease, RVSP 33 mmHg above right atrial pressure    Left heart catheterization 8/15/2018  Proximal 100% LAD occlusion, status post Zions 3.0 x 38 mm drug-eluting stent, left main free of disease, circumflex normal, proximal mid RCA with diffuse calcified  "atheromatous disease but patent, EF 45% with anterior apical and inferior apical wall motion abnormality    Chest x-ray 10/12/2018  No acute process    Most recent labs:     10/12/2018  Hemoglobin 13.5, platelet 172, sodium 138, potassium 3.7, creatinine 0.83, LFTs normal, troponin normal    18  , , HDL 55, LDL 51    2019 hemoglobin 15.6, platelet 201, sodium 139, potassium 4.3, creatinine 0.75, LFTs normal, hemoglobin A1c 6.6, total cholesterol 123, triglycerides 88, HDL 54 1, LDL 54      Past Medical History:   Diagnosis Date   • Allergy    • Anesthesia     hallucinations post anesthesia, at home   • Arthritis     all joints   • CATARACT     bilateral IOL   • Hyperlipidemia    • Hypertension    • Infectious disease     c diff   • Pain    • Unspecified hemorrhagic conditions     \"abd bleeding\"     Past Surgical History:   Procedure Laterality Date   • GASTROSCOPY N/A 2018    Procedure: GASTROSCOPY;  Surgeon: Amari Juárez M.D.;  Location: SURGERY [facility];  Service: Gastroenterology   • CERVICAL FORAMINOTOMY  2013    Performed by Krish Yang M.D. at SURGERY [facility]   • OTHER      torn retina RIGHT EYE   • SCLERAL BUCKLING  2009    Performed by YVETTE DRAPER at SURGERY SAME DAY [facility]S   • OTHER ORTHOPEDIC SURGERY      l knee     Family History   Problem Relation Age of Onset   • Heart Failure Mother 78         at 78   • Heart Disease Brother 70        3 stents     Social History     Social History   • Marital status:      Spouse name: N/A   • Number of children: N/A   • Years of education: N/A     Occupational History   • Not on file.     Social History Main Topics   • Smoking status: Former Smoker     Packs/day: 2.00     Years: 30.00     Types: Cigarettes     Quit date: 1998   • Smokeless tobacco: Never Used   • Alcohol use 0.0 oz/week      Comment: 5 per week   • Drug use: No   • Sexual activity: Not on file " "    Other Topics Concern   • Not on file     Social History Narrative   • No narrative on file     Allergies   Allergen Reactions   • Aspirin Anaphylaxis     INTERNAL BLEEDING   • Augmentin Vomiting   • Clindamycin      Develop c.diff   • Metronidazole      Not sure   • Nsaids Unspecified     GI bleed   • Vancomycin Swelling   • Ace Inhibitors Cough       Current Outpatient Prescriptions   Medication Sig Dispense Refill   • losartan (COZAAR) 50 MG Tab Take 1 Tab by mouth every day. 90 Tab 3   • ipratropium (ATROVENT) 0.06 % Solution Spray 2 Sprays in nose 3 times a day as needed (Rhinorrhea). 3 Bottle 3   • atorvastatin (LIPITOR) 80 MG tablet Take 1 Tab by mouth every bedtime. 90 Tab 3   • metoprolol (LOPRESSOR) 25 MG Tab Take 1 Tab by mouth 2 Times a Day. 180 Tab 3   • omeprazole (PRILOSEC) 20 MG delayed-release capsule Take 1 Cap by mouth every day. 90 Cap 3   • prasugrel (EFFIENT) 10 MG Tab Take 1 Tab by mouth every day. 90 Tab 3   • acetaminophen (TYLENOL) 325 MG Tab Take 650 mg by mouth every four hours as needed.     • nitroglycerin (NITROSTAT) 0.4 MG SL Tab Place 1 Tab under tongue as needed for Chest Pain. Up to 3 every five minutes 25 Tab 11   • aspirin EC 81 MG EC tablet Take 1 Tab by mouth every day. 30 Tab 3     No current facility-administered medications for this visit.        Review of Systems   HENT: Positive for tinnitus.    Musculoskeletal: Positive for back pain and joint pain.   Endo/Heme/Allergies: Bruises/bleeds easily.     All others systems reviewed and negative.     Objective:     Blood pressure 150/90, pulse (!) 54, height 1.854 m (6' 1\"), weight 88.5 kg (195 lb), SpO2 91 %. Body mass index is 25.73 kg/m².    Physical Exam   General: No acute distress. Well nourished.  HEENT: EOM grossly intact, no scleral icterus, no pharyngeal erythema.   Neck:  No JVD, no bruits, trachea midline  CVS: RRR. Normal S1, S2. No M/R/G. No LE edema.  2+ radial pulses, 2+ DP pulses  Resp: CTAB. No wheezing or " crackles/rhonchi. Normal respiratory effort.  Abdomen: Soft, NT, no aneudy hepatomegaly, well healed midline incision, calcified xyphoid process.  MSK/Ext: No clubbing or cyanosis.  Skin: Warm and dry, no rashes.  Neurological: CN III-XII grossly intact. No focal deficits.   Psych: A&O x 3, appropriate affect, good judgement      Assessment:     1. Essential hypertension     2. Dyslipidemia     3. Coronary artery disease involving native coronary artery of native heart without angina pectoris  BASIC METABOLIC PANEL    CBC WITHOUT DIFFERENTIAL   4. S/P angioplasty with stent     5. Abnormal liver function     6. Erectile dysfunction due to arterial insufficiency     7. History of MI (myocardial infarction)     8. Controlled type 2 diabetes mellitus without complication, without long-term current use of insulin (HCC)     9. Ventricular tachycardia (HCC)     10. Cardiac arrest (HCC)     11. Iron deficiency anemia due to chronic blood loss  BASIC METABOLIC PANEL    CBC WITHOUT DIFFERENTIAL       Medical Decision Making:  Today's Assessment / Status / Plan:     -He should FU with GI to discuss long term PPI, prior serious GI bleeding on 9 aspirins daily, pt nervous about future bleeding  -Nitro PRN, but sounds like he's not needing it  -BP not controlled, increase losartan 50 mg  -Cont DAPT for 1 year, stop August 2019  -EF was 45%, mildly reduced, on meto tart which is fine.  If EF was 40% or under then switch to succinate.    Written instructions given today:  -Increase losartan to 50 mg daily (take 2 of your 25 mg in the morning until gone), new prescription send to [facility]  -Goal blood pressure is under 130/80.  -If you are not at goal, we can continue to increase your losartan or will need to add another medication  -Return in 6 months with non-fasting blood work prior to monitor kidney function and blood count      Return in about 6 months (around 8/1/2019).    It is my pleasure to participate in the care of   [place].  Please do not hesitate to contact me with questions or concerns.    Sil Jacobo MD, [facility]  Cardiologist [facility]    Please note that this dictation was created using voice recognition software. I have made every reasonable attempt to correct obvious errors, but it is possible there are errors of grammar and possibly content that I did not discover before finalizing the note.

## 2019-02-01 NOTE — PATIENT INSTRUCTIONS
-Increase losartan to 50 mg daily (take 2 of your 25 mg in the morning until gone), new prescription send to [facility]  -Goal blood pressure is under 130/80.  -If you are not at goal, we can continue to increase your losartan or will need to add another medication  -Return in 6 months with non-fasting blood work prior to monitor kidney function and blood count

## 2019-02-01 NOTE — LETTER
[facility]   [address]   [address]  [place] [address]  Phone: [phone]  Fax: [phone]              Vinod Conrad  1945    Encounter Date: 2/1/2019    Sil Jacobo M.D.          PROGRESS NOTE:  Subjective:   Chief Complaint:   Chief Complaint   Patient presents with   • Coronary Artery Disease       Vinod Conrad is a 73 y.o. male who returns for further management of coronary artery disease.  He had an anterior ST elevation MI August 15, 2018 and had a stent placed to the LAD. Was having arm and shoulder pain 2 days prior to presentation, finally brought to the ED, had cardiac arrest in the ED, went to the lab in arrest, had stent to LAD.  Came home, had dark stools a few days later, ended up with GI bleeding, back to the hospital, had ulcers that were clipped and started on PPI. No recurrence of VT during his stay or since.    He had some minimal disease in the RCA but otherwise patent vessels, EF was 45% in the Cath Lab. He was back in the emergency room 10/12/2018 with fluttering in his chest but all of his evaluation was reassuring.    He is maintained on aspirin, high-dose Lipitor, Effient, metoprolol and losartan. No more bleeding.  Is about to go to PPI once daily.    He rides an exercise bike every day, 5-6 miles, stationary, recumbant.  He is not limited by chest pain, pressure or tightness. No significant dyspnea on exertion, orthopnea or lower extremity swelling. No significant palpitations, dizziness, or presyncope/syncope. No symptoms of leg claudication. Angela pains, that are brief.    LDL was 54, on statin.   His BP was elevated, thinks it's related to wife's health issues, having severe migraines.  No issues with bleeding on DAPT.    DATA REVIEWED by me:  ECG 10/12/2018  Sinus bradycardia, rate 41, anterior and lateral T wave inversion    Echo 8/17/2018  Mild LVH, EF 45%, akinesis of the distal septum  "and apex, no significant valve disease, RVSP 33 mmHg above right atrial pressure    Left heart catheterization 8/15/2018  Proximal 100% LAD occlusion, status post Zions 3.0 x 38 mm drug-eluting stent, left main free of disease, circumflex normal, proximal mid RCA with diffuse calcified atheromatous disease but patent, EF 45% with anterior apical and inferior apical wall motion abnormality    Chest x-ray 10/12/2018  No acute process    Most recent labs:     10/12/2018  Hemoglobin 13.5, platelet 172, sodium 138, potassium 3.7, creatinine 0.83, LFTs normal, troponin normal    18  , , HDL 55, LDL 51    2019 hemoglobin 15.6, platelet 201, sodium 139, potassium 4.3, creatinine 0.75, LFTs normal, hemoglobin A1c 6.6, total cholesterol 123, triglycerides 88, HDL 54 1, LDL 54      Past Medical History:   Diagnosis Date   • Allergy    • Anesthesia     hallucinations post anesthesia, at home   • Arthritis     all joints   • CATARACT     bilateral IOL   • Hyperlipidemia    • Hypertension    • Infectious disease     c diff   • Pain    • Unspecified hemorrhagic conditions     \"abd bleeding\"     Past Surgical History:   Procedure Laterality Date   • GASTROSCOPY N/A 2018    Procedure: GASTROSCOPY;  Surgeon: Amari Juárez M.D.;  Location: SURGERY [facility];  Service: Gastroenterology   • CERVICAL FORAMINOTOMY  2013    Performed by Krish Yang M.D. at SURGERY [facility]   • OTHER      torn retina RIGHT EYE   • SCLERAL BUCKLING  2009    Performed by YVETTE DRAPER at SURGERY SAME DAY [facility] ORS   • OTHER ORTHOPEDIC SURGERY      l knee     Family History   Problem Relation Age of Onset   • Heart Failure Mother 78         at 78   • Heart Disease Brother 70        3 stents     Social History     Social History   • Marital status:      Spouse name: N/A   • Number of children: N/A   • Years of education: N/A     Occupational History   • Not on file.  " "    Social History Main Topics   • Smoking status: Former Smoker     Packs/day: 2.00     Years: 30.00     Types: Cigarettes     Quit date: 1/1/1998   • Smokeless tobacco: Never Used   • Alcohol use 0.0 oz/week      Comment: 5 per week   • Drug use: No   • Sexual activity: Not on file     Other Topics Concern   • Not on file     Social History Narrative   • No narrative on file     Allergies   Allergen Reactions   • Aspirin Anaphylaxis     INTERNAL BLEEDING   • Augmentin Vomiting   • Clindamycin      Develop c.diff   • Metronidazole      Not sure   • Nsaids Unspecified     GI bleed   • Vancomycin Swelling   • Ace Inhibitors Cough       Current Outpatient Prescriptions   Medication Sig Dispense Refill   • losartan (COZAAR) 50 MG Tab Take 1 Tab by mouth every day. 90 Tab 3   • ipratropium (ATROVENT) 0.06 % Solution Spray 2 Sprays in nose 3 times a day as needed (Rhinorrhea). 3 Bottle 3   • atorvastatin (LIPITOR) 80 MG tablet Take 1 Tab by mouth every bedtime. 90 Tab 3   • metoprolol (LOPRESSOR) 25 MG Tab Take 1 Tab by mouth 2 Times a Day. 180 Tab 3   • omeprazole (PRILOSEC) 20 MG delayed-release capsule Take 1 Cap by mouth every day. 90 Cap 3   • prasugrel (EFFIENT) 10 MG Tab Take 1 Tab by mouth every day. 90 Tab 3   • acetaminophen (TYLENOL) 325 MG Tab Take 650 mg by mouth every four hours as needed.     • nitroglycerin (NITROSTAT) 0.4 MG SL Tab Place 1 Tab under tongue as needed for Chest Pain. Up to 3 every five minutes 25 Tab 11   • aspirin EC 81 MG EC tablet Take 1 Tab by mouth every day. 30 Tab 3     No current facility-administered medications for this visit.        Review of Systems   HENT: Positive for tinnitus.    Musculoskeletal: Positive for back pain and joint pain.   Endo/Heme/Allergies: Bruises/bleeds easily.     All others systems reviewed and negative.     Objective:     Blood pressure 150/90, pulse (!) 54, height 1.854 m (6' 1\"), weight 88.5 kg (195 lb), SpO2 91 %. Body mass index is 25.73 " kg/m².    Physical Exam   General: No acute distress. Well nourished.  HEENT: EOM grossly intact, no scleral icterus, no pharyngeal erythema.   Neck:  No JVD, no bruits, trachea midline  CVS: RRR. Normal S1, S2. No M/R/G. No LE edema.  2+ radial pulses, 2+ DP pulses  Resp: CTAB. No wheezing or crackles/rhonchi. Normal respiratory effort.  Abdomen: Soft, NT, no aneudy hepatomegaly, well healed midline incision, calcified xyphoid process.  MSK/Ext: No clubbing or cyanosis.  Skin: Warm and dry, no rashes.  Neurological: CN III-XII grossly intact. No focal deficits.   Psych: A&O x 3, appropriate affect, good judgement      Assessment:     1. Essential hypertension     2. Dyslipidemia     3. Coronary artery disease involving native coronary artery of native heart without angina pectoris  BASIC METABOLIC PANEL    CBC WITHOUT DIFFERENTIAL   4. S/P angioplasty with stent     5. Abnormal liver function     6. Erectile dysfunction due to arterial insufficiency     7. History of MI (myocardial infarction)     8. Controlled type 2 diabetes mellitus without complication, without long-term current use of insulin (HCC)     9. Ventricular tachycardia (HCC)     10. Cardiac arrest (HCC)     11. Iron deficiency anemia due to chronic blood loss  BASIC METABOLIC PANEL    CBC WITHOUT DIFFERENTIAL       Medical Decision Making:  Today's Assessment / Status / Plan:     -He should FU with GI to discuss long term PPI, prior serious GI bleeding on 9 aspirins daily, pt nervous about future bleeding  -Nitro PRN, but sounds like he's not needing it  -BP not controlled, increase losartan 50 mg  -Cont DAPT for 1 year, stop August 2019  -EF was 45%, mildly reduced, on meto tart which is fine.  If EF was 40% or under then switch to succinate.    Written instructions given today:  -Increase losartan to 50 mg daily (take 2 of your 25 mg in the morning until gone), [facility]  -Goal blood pressure is under 130/80.  -If you are not at  goal, we can continue to increase your losartan or will need to add another medication  -Return in 6 months with non-fasting blood work prior to monitor kidney function and blood count      Return in about 6 months (around 8/1/2019).    It is my pleasure to participate in the care of Mr. Conrad.  Please do not hesitate to contact me with questions or concerns.    Sil Jacobo MD, [facility]  Cardiologist [facility]    Please note that this dictation was created using voice recognition software. I have made every reasonable attempt to correct obvious errors, but it is possible there are errors of grammar and possibly content that I did not discover before finalizing the note.      Reid Wills M.D.  [address]  [address]  [facility] [address]  VIA In Basket

## 2019-02-05 ENCOUNTER — OFFICE VISIT (OUTPATIENT)
Dept: MEDICAL GROUP | Facility: MEDICAL CENTER | Age: 74
End: 2019-02-05
Payer: MEDICARE

## 2019-02-05 VITALS
BODY MASS INDEX: 26.24 KG/M2 | WEIGHT: 198 LBS | TEMPERATURE: 97.8 F | HEIGHT: 73 IN | SYSTOLIC BLOOD PRESSURE: 138 MMHG | OXYGEN SATURATION: 96 % | HEART RATE: 49 BPM | DIASTOLIC BLOOD PRESSURE: 80 MMHG

## 2019-02-05 DIAGNOSIS — E11.9 CONTROLLED TYPE 2 DIABETES MELLITUS WITHOUT COMPLICATION, WITHOUT LONG-TERM CURRENT USE OF INSULIN (HCC): Chronic | ICD-10-CM

## 2019-02-05 DIAGNOSIS — Z87.19 HISTORY OF GASTROINTESTINAL BLEEDING: ICD-10-CM

## 2019-02-05 DIAGNOSIS — E78.5 DYSLIPIDEMIA: Chronic | ICD-10-CM

## 2019-02-05 DIAGNOSIS — I10 ESSENTIAL HYPERTENSION: Chronic | ICD-10-CM

## 2019-02-05 PROCEDURE — 99214 OFFICE O/P EST MOD 30 MIN: CPT | Performed by: FAMILY MEDICINE

## 2019-02-05 ASSESSMENT — PATIENT HEALTH QUESTIONNAIRE - PHQ9: CLINICAL INTERPRETATION OF PHQ2 SCORE: 0

## 2019-02-05 NOTE — ASSESSMENT & PLAN NOTE
Losartan was increased from 25 mg daily and 50 mg daily by his cardiologist.  No noticeable side effects.  Patient is also tolerating metoprolol 25 mg twice daily.

## 2019-02-05 NOTE — ASSESSMENT & PLAN NOTE
Patient is tolerating atorvastatin 80 mg daily.  Results for RAINE ERNST (MRN [id number]) as of 2/5/2019 09:14   Ref. Range 1/24/2019 07:58   Cholesterol,Tot Latest Ref Range: 100 - 199 mg/dL 123   Triglycerides Latest Ref Range: 0 - 149 mg/dL 88   HDL Latest Ref Range: >=40 mg/dL 51   LDL Latest Ref Range: <100 mg/dL 54

## 2019-02-05 NOTE — ASSESSMENT & PLAN NOTE
Has been doing stationary bicycle for about 30-35 minutes. No chest pain with exercise.  Not currently on diabetes medication.  A1c is 6.6.

## 2019-02-05 NOTE — ASSESSMENT & PLAN NOTE
Patient has history of GI bleed when he was taking aspirin and Effient.  He is currently on omeprazole 20 mg daily.  He denies any side effects.  He has an appointment GI to discuss how long he should be on omeprazole.

## 2019-02-05 NOTE — PROGRESS NOTES
Subjective:   Vinod Ernst is a 73 y.o. male here today for diabetes    Diabetes mellitus type 2, controlled ([facility])  Has been doing stationary bicycle for about 30-35 minutes. No chest pain with exercise.  Not currently on diabetes medication.  A1c is 6.6.    Dyslipidemia  Patient is tolerating atorvastatin 80 mg daily.  Results for VINOD ERNST (MRN [id number]) as of 2/5/2019 09:14   Ref. Range 1/24/2019 07:58   Cholesterol,Tot Latest Ref Range: 100 - 199 mg/dL 123   Triglycerides Latest Ref Range: 0 - 149 mg/dL 88   HDL Latest Ref Range: >=40 mg/dL 51   LDL Latest Ref Range: <100 mg/dL 54       Hypertension  Losartan was increased from 25 mg daily and 50 mg daily by his cardiologist.  No noticeable side effects.  Patient is also tolerating metoprolol 25 mg twice daily.    History of gastrointestinal bleeding  Patient has history of GI bleed when he was taking aspirin and Effient.  He is currently on omeprazole 20 mg daily.  He denies any side effects.  He has an appointment GI to discuss how long he should be on omeprazole.         Current medicines (including changes today)  Current Outpatient Prescriptions   Medication Sig Dispense Refill   • losartan (COZAAR) 50 MG Tab Take 1 Tab by mouth every day. 90 Tab 3   • ipratropium (ATROVENT) 0.06 % Solution Spray 2 Sprays in nose 3 times a day as needed (Rhinorrhea). 3 Bottle 3   • atorvastatin (LIPITOR) 80 MG tablet Take 1 Tab by mouth every bedtime. 90 Tab 3   • metoprolol (LOPRESSOR) 25 MG Tab Take 1 Tab by mouth 2 Times a Day. 180 Tab 3   • omeprazole (PRILOSEC) 20 MG delayed-release capsule Take 1 Cap by mouth every day. 90 Cap 3   • prasugrel (EFFIENT) 10 MG Tab Take 1 Tab by mouth every day. 90 Tab 3   • acetaminophen (TYLENOL) 325 MG Tab Take 650 mg by mouth every four hours as needed.     • nitroglycerin (NITROSTAT) 0.4 MG SL Tab Place 1 Tab under tongue as needed for Chest Pain. Up to 3 every five minutes 25 Tab 11   • aspirin EC 81 MG EC tablet  "Take 1 Tab by mouth every day. 30 Tab 3     No current facility-administered medications for this visit.      He  has a past medical history of Allergy; Anesthesia (2006); Arthritis; CATARACT; Hyperlipidemia; Hypertension; Infectious disease (2011); Pain; and Unspecified hemorrhagic conditions (2006). He also has no past medical history of ASTHMA.    ROS   No chest pain, no shortness of breath       Objective:     Blood pressure 138/80, pulse (!) 49, temperature 36.6 °C (97.8 °F), height 1.854 m (6' 1\"), weight 89.8 kg (198 lb), SpO2 96 %. Body mass index is 26.12 kg/m².   Physical Exam:  Constitutional: Alert, no distress.  Skin: Warm, dry, good turgor, no rashes in visible areas.  Eye: Equal, round and reactive, conjunctiva clear, lids normal.  Psych: Alert and oriented x3, normal affect and mood.        Assessment and Plan:   The following treatment plan was discussed    1. Controlled type 2 diabetes mellitus without complication, without long-term current use of insulin (HCC)  Controlled.  Discussed starting metformin for cardioprotective properties, patient declines.  He is going to continue healthy lifestyle and follow-up with labs in 6 months.  - HEMOGLOBIN A1C; Future  - Lipid Profile; Future    2. Dyslipidemia  Controlled.  Continue atorvastatin.    3. Essential hypertension  Controlled.  Continue losartan and metoprolol.    4. History of gastrointestinal bleeding  Encourage patient to follow-up with GI to discuss how long he should be on omeprazole.      Followup: Return in about 6 months (around 8/5/2019) for Diabetes.         "

## 2019-03-14 ENCOUNTER — OFFICE VISIT (OUTPATIENT)
Dept: MEDICAL GROUP | Facility: MEDICAL CENTER | Age: 74
End: 2019-03-14
Payer: MEDICARE

## 2019-03-14 VITALS
WEIGHT: 194 LBS | HEIGHT: 73 IN | TEMPERATURE: 59 F | DIASTOLIC BLOOD PRESSURE: 96 MMHG | BODY MASS INDEX: 25.71 KG/M2 | SYSTOLIC BLOOD PRESSURE: 148 MMHG | OXYGEN SATURATION: 96 % | HEART RATE: 59 BPM

## 2019-03-14 DIAGNOSIS — J01.00 ACUTE NON-RECURRENT MAXILLARY SINUSITIS: ICD-10-CM

## 2019-03-14 DIAGNOSIS — H00.012 HORDEOLUM EXTERNUM OF RIGHT LOWER EYELID: ICD-10-CM

## 2019-03-14 PROCEDURE — 99214 OFFICE O/P EST MOD 30 MIN: CPT | Performed by: FAMILY MEDICINE

## 2019-03-14 RX ORDER — IPRATROPIUM BROMIDE 42 UG/1
2 SPRAY, METERED NASAL 3 TIMES DAILY PRN
Qty: 1 BOTTLE | Refills: 5 | Status: SHIPPED | OUTPATIENT
Start: 2019-03-14 | End: 2019-07-22

## 2019-03-14 RX ORDER — AZITHROMYCIN 250 MG/1
TABLET, FILM COATED ORAL
Qty: 6 TAB | Refills: 0 | Status: SHIPPED | OUTPATIENT
Start: 2019-03-14 | End: 2019-03-19

## 2019-03-14 NOTE — PROGRESS NOTES
Subjective:   Vinod Conrad is a 74 y.o. male here today for sinusitis    For the last past 2 days, symptoms getting worse, started with significant sore throat. Has been around grandson who is high school and had similar symptoms.  Having sore throat and rhinorrhea with green mucus.  Mild sinus pressure over maxillary sinuses, right more than left.  Has been trying Atrovent nasal and allergy medication.    Having persistent right lower mid eyelid stye. Occasionally uses warm compresses.    Current medicines (including changes today)  Current Outpatient Prescriptions   Medication Sig Dispense Refill   • ipratropium (ATROVENT) 0.06 % Solution Spray 2 Sprays in nose 3 times a day as needed (Rhinorrhea). 1 Bottle 5   • azithromycin (ZITHROMAX) 250 MG Tab 2 tabs by mouth day 1, 1 tab by mouth days 2-5 6 Tab 0   • losartan (COZAAR) 50 MG Tab Take 1 Tab by mouth every day. 90 Tab 3   • atorvastatin (LIPITOR) 80 MG tablet Take 1 Tab by mouth every bedtime. 90 Tab 3   • metoprolol (LOPRESSOR) 25 MG Tab Take 1 Tab by mouth 2 Times a Day. 180 Tab 3   • omeprazole (PRILOSEC) 20 MG delayed-release capsule Take 1 Cap by mouth every day. 90 Cap 3   • prasugrel (EFFIENT) 10 MG Tab Take 1 Tab by mouth every day. 90 Tab 3   • acetaminophen (TYLENOL) 325 MG Tab Take 650 mg by mouth every four hours as needed.     • nitroglycerin (NITROSTAT) 0.4 MG SL Tab Place 1 Tab under tongue as needed for Chest Pain. Up to 3 every five minutes 25 Tab 11   • aspirin EC 81 MG EC tablet Take 1 Tab by mouth every day. 30 Tab 3     No current facility-administered medications for this visit.      He  has a past medical history of Allergy; Anesthesia (2006); Arthritis; CATARACT; Hyperlipidemia; Hypertension; Infectious disease (2011); Pain; and Unspecified hemorrhagic conditions (2006). He also has no past medical history of ASTHMA.    ROS   No fever. + Watery eyes.       Objective:     Blood pressure 148/96, pulse (!) 59, temperature (!) 15 °C  "(59 °F), height 1.854 m (6' 1\"), weight 88 kg (194 lb), SpO2 96 %. Body mass index is 25.6 kg/m².   Physical Exam:  Constitutional: Alert, no distress.  Skin: Warm, dry, good turgor, no rashes in visible areas.  Eye: Equal, round and reactive, conjunctiva clear, right mid lower lid with mild erythema and nodule.  ENMT: Lips without lesions, good dentition, oropharynx clear.  Neck: Trachea midline, no masses, no thyromegaly. No cervical or supraclavicular lymphadenopathy  Respiratory: Unlabored respiratory effort, lungs clear to auscultation, no wheezes, no ronchi.  Cardiovascular: Normal S1, S2, no murmur, no edema.  Psych: Alert and oriented x3, normal affect and mood.        Assessment and Plan:   The following treatment plan was discussed    1. Acute non-recurrent maxillary sinusitis  New problem. Not sure if it is viral or bacterial.  Trial of atrovent nasal. Prescription for Z-ana if symptoms do not improve.  - ipratropium (ATROVENT) 0.06 % Solution; Spray 2 Sprays in nose 3 times a day as needed (Rhinorrhea).  Dispense: 1 Bottle; Refill: 5  - azithromycin (ZITHROMAX) 250 MG Tab; 2 tabs by mouth day 1, 1 tab by mouth days 2-5  Dispense: 6 Tab; Refill: 0    2. Hordeolum externum of right lower eyelid  Advised warm compresses 3 times daily. If no improvement consider ophthalmology referral.        Followup: Return if symptoms worsen or fail to improve.         "

## 2019-03-18 ENCOUNTER — TELEPHONE (OUTPATIENT)
Dept: CARDIOLOGY | Facility: MEDICAL CENTER | Age: 74
End: 2019-03-18

## 2019-03-18 NOTE — TELEPHONE ENCOUNTER
medication question   Received: Today   Message Contents   Rhiannon Rojas R.N.             HENRY/Mona     Patient wants to know if he can take Imodium for his diarrhea. He can be reached at [phone].      Pt has been having diarrhea since yesterday; otherwise he feels fine. Denies signs of blood in stool. Denies fever. Pt would like to take Imodium, if needed, and wants to know if OK with his history and other medications.     To Dr. Jacobo--please advise.

## 2019-03-19 ENCOUNTER — HOSPITAL ENCOUNTER (OUTPATIENT)
Facility: MEDICAL CENTER | Age: 74
End: 2019-03-19
Attending: FAMILY MEDICINE
Payer: MEDICARE

## 2019-03-19 ENCOUNTER — OFFICE VISIT (OUTPATIENT)
Dept: MEDICAL GROUP | Facility: MEDICAL CENTER | Age: 74
End: 2019-03-19
Payer: MEDICARE

## 2019-03-19 VITALS
DIASTOLIC BLOOD PRESSURE: 74 MMHG | HEIGHT: 73 IN | SYSTOLIC BLOOD PRESSURE: 134 MMHG | HEART RATE: 63 BPM | TEMPERATURE: 98.2 F | WEIGHT: 192 LBS | OXYGEN SATURATION: 97 % | BODY MASS INDEX: 25.45 KG/M2

## 2019-03-19 DIAGNOSIS — R19.7 DIARRHEA, UNSPECIFIED TYPE: ICD-10-CM

## 2019-03-19 PROCEDURE — 99214 OFFICE O/P EST MOD 30 MIN: CPT | Performed by: FAMILY MEDICINE

## 2019-03-19 PROCEDURE — 87493 C DIFF AMPLIFIED PROBE: CPT

## 2019-03-19 NOTE — TELEPHONE ENCOUNTER
Sil Jacobo M.D.   You 20 hours ago (5:08 PM)      OK to take, tx      Pt notified of above. Pt states he saw PCP and provided stool sample. Advised to try to stay properly hydrated.

## 2019-03-19 NOTE — ASSESSMENT & PLAN NOTE
Has a personal history of C. Diff. Did not fill or use Z-ana, which we recently prescribed, because he got better.  He has been drinking more juice and eating more apples, which is unusual for him.  Symptoms started yesterday, watery initially. Diarrhea has been on and off since.  Very mild lower abdominal pain, which is not too uncommon for him.  Appetite is normal.  No constipation before symptoms started.

## 2019-03-19 NOTE — PROGRESS NOTES
"Subjective:   Vinod Conrad is a 74 y.o. male here today for diarrhea    Diarrhea  Has a personal history of C. Diff. Did not fill or use Z-ana, which we recently prescribed, because he got better.  He has been drinking more juice and eating more apples, which is unusual for him.  Symptoms started yesterday, watery initially. Diarrhea has been on and off since.  Very mild lower abdominal pain, which is not too uncommon for him.  Appetite is normal.  No constipation before symptoms started.         Current medicines (including changes today)  Current Outpatient Prescriptions   Medication Sig Dispense Refill   • ipratropium (ATROVENT) 0.06 % Solution Spray 2 Sprays in nose 3 times a day as needed (Rhinorrhea). 1 Bottle 5   • losartan (COZAAR) 50 MG Tab Take 1 Tab by mouth every day. 90 Tab 3   • atorvastatin (LIPITOR) 80 MG tablet Take 1 Tab by mouth every bedtime. 90 Tab 3   • metoprolol (LOPRESSOR) 25 MG Tab Take 1 Tab by mouth 2 Times a Day. 180 Tab 3   • omeprazole (PRILOSEC) 20 MG delayed-release capsule Take 1 Cap by mouth every day. 90 Cap 3   • prasugrel (EFFIENT) 10 MG Tab Take 1 Tab by mouth every day. 90 Tab 3   • acetaminophen (TYLENOL) 325 MG Tab Take 650 mg by mouth every four hours as needed.     • nitroglycerin (NITROSTAT) 0.4 MG SL Tab Place 1 Tab under tongue as needed for Chest Pain. Up to 3 every five minutes 25 Tab 11   • aspirin EC 81 MG EC tablet Take 1 Tab by mouth every day. 30 Tab 3     No current facility-administered medications for this visit.      He  has a past medical history of Allergy; Anesthesia (2006); Arthritis; CATARACT; Hyperlipidemia; Hypertension; Infectious disease (2011); Pain; and Unspecified hemorrhagic conditions (2006). He also has no past medical history of ASTHMA.    ROS   No fever, no significant abdominal pain       Objective:     Blood pressure 134/74, pulse 63, temperature 36.8 °C (98.2 °F), height 1.854 m (6' 1\"), weight 87.1 kg (192 lb), SpO2 97 %. Body " mass index is 25.33 kg/m².   Physical Exam:  Constitutional: Alert, no distress.  Skin: Warm, dry, good turgor, no rashes in visible areas.  Eye: Equal, round and reactive, conjunctiva clear, lids normal.  Abdomen: Soft, non-tender, no masses, no hepatosplenomegaly.  Psych: Alert and oriented x3, normal affect and mood.        Assessment and Plan:   The following treatment plan was discussed    1. Diarrhea, unspecified type  New problem.  Possibly related to dietary changes including more juice and fruits.  Given his history of C. difficile we will do a check for recurrent C. difficile with a stool tests, call with results.  - Cdiff By PCR Rflx Toxin; Future      Followup: Return if symptoms worsen or fail to improve.

## 2019-03-20 ENCOUNTER — TELEPHONE (OUTPATIENT)
Dept: MEDICAL GROUP | Facility: MEDICAL CENTER | Age: 74
End: 2019-03-20

## 2019-03-20 LAB
C DIFF DNA SPEC QL NAA+PROBE: NEGATIVE
C DIFF TOX GENS STL QL NAA+PROBE: NEGATIVE

## 2019-03-20 NOTE — TELEPHONE ENCOUNTER
----- Message from Reid Wills M.D. sent at 3/20/2019  8:38 AM PDT -----  Please notify patient his C. difficile testing is negative.  Please have him decrease his apple juice intake and gradually go back to his normal diet.  Reid Wills M.D.

## 2019-05-13 ENCOUNTER — OFFICE VISIT (OUTPATIENT)
Dept: URGENT CARE | Facility: MEDICAL CENTER | Age: 74
End: 2019-05-13
Payer: MEDICARE

## 2019-05-13 ENCOUNTER — TELEPHONE (OUTPATIENT)
Dept: CARDIOLOGY | Facility: MEDICAL CENTER | Age: 74
End: 2019-05-13

## 2019-05-13 VITALS
OXYGEN SATURATION: 98 % | HEIGHT: 73 IN | SYSTOLIC BLOOD PRESSURE: 164 MMHG | DIASTOLIC BLOOD PRESSURE: 78 MMHG | WEIGHT: 192 LBS | HEART RATE: 56 BPM | BODY MASS INDEX: 25.45 KG/M2 | TEMPERATURE: 97.1 F

## 2019-05-13 DIAGNOSIS — L72.3 INFECTED SEBACEOUS CYST: ICD-10-CM

## 2019-05-13 DIAGNOSIS — L08.9 INFECTED SEBACEOUS CYST: ICD-10-CM

## 2019-05-13 DIAGNOSIS — L72.3 SEBACEOUS CYST: ICD-10-CM

## 2019-05-13 PROCEDURE — 99214 OFFICE O/P EST MOD 30 MIN: CPT | Performed by: NURSE PRACTITIONER

## 2019-05-13 RX ORDER — CEPHALEXIN 500 MG/1
500 CAPSULE ORAL 3 TIMES DAILY
Qty: 21 CAP | Refills: 0 | Status: SHIPPED | OUTPATIENT
Start: 2019-05-13 | End: 2019-05-20

## 2019-05-13 ASSESSMENT — ENCOUNTER SYMPTOMS
DIZZINESS: 0
VOMITING: 0
TINGLING: 0
NAUSEA: 0
FEVER: 0
HEADACHES: 0

## 2019-05-13 ASSESSMENT — LIFESTYLE VARIABLES: SUBSTANCE_ABUSE: 0

## 2019-05-13 NOTE — PROGRESS NOTES
"Subjective:      Vinod Conrad is a 74 y.o. male who presents with Other (infection on face needs prescription)    Reviewed past medical, surgical and family history with patient. Reviewed prescription and OTC medications with patient in electronic health record today.     Allergies   Allergen Reactions   • Aspirin Anaphylaxis     INTERNAL BLEEDING   • Augmentin Vomiting   • Clindamycin      Develop c.diff   • Metronidazole      Not sure   • Nsaids Unspecified     GI bleed   • Vancomycin Swelling   • Ace Inhibitors Cough             HPI Hx of infected sebaceous cyst. Has cyst on right lower chin. Keflex has worked well in the past. Was unable to get an appt with his dermatologist or PCP. Swelling in increased to his lip. Symptoms are rapidly worsening.  Pain 4/10 . Treatment tried: warm compresses. No other aggravating or alleviating factors.  Antibiotics have worked well in the past.         Review of Systems   Constitutional: Negative for fever.   Gastrointestinal: Negative for nausea and vomiting.   Neurological: Negative for dizziness, tingling and headaches.   Endo/Heme/Allergies: Negative for environmental allergies.   Psychiatric/Behavioral: Negative for substance abuse.          Objective:     BP (!) 164/78   Pulse (!) 56   Temp 36.2 °C (97.1 °F) (Temporal)   Ht 1.854 m (6' 1\")   Wt 87.1 kg (192 lb)   SpO2 98%   BMI 25.33 kg/m²      Physical Exam   Constitutional: He is oriented to person, place, and time. He appears well-developed and well-nourished.   HENT:   Head: Normocephalic.       Cardiovascular: Normal rate and regular rhythm.    Pulmonary/Chest: Breath sounds normal.   Neurological: He is alert and oriented to person, place, and time.   Skin: Skin is warm. Capillary refill takes less than 2 seconds.   Psychiatric: He has a normal mood and affect. Judgment and thought content normal.   Nursing note and vitals reviewed.                 Assessment/Plan:     1. Infected sebaceous cyst     "   2. Sebaceous cyst  cephALEXin (KEFLEX) 500 MG Cap       Warm compresses BID / prn discomfort  OTC  analgesic of choice. Follow manufactures dosing and safety precautions.     Educated in proper administration of medication(s) ordered today including safety, possible SE, risks, benefits, rationale and alternatives to therapy.     Return to clinic or PCP prn  if current symptoms are not resolving in a satisfactory manner or sooner if new or worsening symptoms occur.   Patient was advised of signs and symptoms which would warrant further evaluation and /or emergent evaluation in ER.  Verbalized agreement with this treatment plan and seemed to understand without barriers. Questions were encouraged and answered to patients satisfaction.     Aftercare instructions were given to pt

## 2019-05-13 NOTE — TELEPHONE ENCOUNTER
Rhiannon Rojas R.N.             HENRY/Mona     Patient has questions about antibiotics he needs for infected cysts on his face. He wants to know what he can take that won't interfere with his cardiac medication. He can be reached at [phone].      Spoke to pt. Has not been prescribed antibiotics just yet. Advised once he is to follow up w/his pharmacist to discuss interactions, if any. Pt verbalized understanding.

## 2019-07-08 ENCOUNTER — APPOINTMENT (RX ONLY)
Dept: URBAN - METROPOLITAN AREA CLINIC 4 | Facility: CLINIC | Age: 74
Setting detail: DERMATOLOGY
End: 2019-07-08

## 2019-07-08 DIAGNOSIS — D18.0 HEMANGIOMA: ICD-10-CM

## 2019-07-08 DIAGNOSIS — L82.1 OTHER SEBORRHEIC KERATOSIS: ICD-10-CM

## 2019-07-08 DIAGNOSIS — L81.4 OTHER MELANIN HYPERPIGMENTATION: ICD-10-CM

## 2019-07-08 DIAGNOSIS — L72.8 OTHER FOLLICULAR CYSTS OF THE SKIN AND SUBCUTANEOUS TISSUE: ICD-10-CM

## 2019-07-08 DIAGNOSIS — Z85.828 PERSONAL HISTORY OF OTHER MALIGNANT NEOPLASM OF SKIN: ICD-10-CM

## 2019-07-08 PROBLEM — D18.01 HEMANGIOMA OF SKIN AND SUBCUTANEOUS TISSUE: Status: ACTIVE | Noted: 2019-07-08

## 2019-07-08 PROCEDURE — ? ADDITIONAL NOTES

## 2019-07-08 PROCEDURE — ? COUNSELING

## 2019-07-08 PROCEDURE — 99213 OFFICE O/P EST LOW 20 MIN: CPT

## 2019-07-08 ASSESSMENT — LOCATION SIMPLE DESCRIPTION DERM
LOCATION SIMPLE: UPPER BACK
LOCATION SIMPLE: LEFT UPPER BACK
LOCATION SIMPLE: LEFT FOREHEAD
LOCATION SIMPLE: LEFT CHEEK
LOCATION SIMPLE: CHIN
LOCATION SIMPLE: CHEST
LOCATION SIMPLE: LEFT UPPER ARM
LOCATION SIMPLE: RIGHT UPPER ARM

## 2019-07-08 ASSESSMENT — LOCATION ZONE DERM
LOCATION ZONE: ARM
LOCATION ZONE: TRUNK
LOCATION ZONE: FACE

## 2019-07-08 ASSESSMENT — LOCATION DETAILED DESCRIPTION DERM
LOCATION DETAILED: LEFT PROXIMAL POSTERIOR UPPER ARM
LOCATION DETAILED: STERNUM
LOCATION DETAILED: LEFT CENTRAL MALAR CHEEK
LOCATION DETAILED: LEFT SUPERIOR MEDIAL UPPER BACK
LOCATION DETAILED: LEFT FOREHEAD
LOCATION DETAILED: LEFT SUPERIOR LATERAL BUCCAL CHEEK
LOCATION DETAILED: RIGHT CHIN
LOCATION DETAILED: SUPERIOR THORACIC SPINE
LOCATION DETAILED: RIGHT PROXIMAL POSTERIOR UPPER ARM

## 2019-07-08 NOTE — HPI: CYST
How Severe Is Your Cyst?: mild
Is This A New Presentation, Or A Follow-Up?: Cyst
Additional History: Patient went to Urgent Care 2 months ago and patient states it was infected and draining. He was put on Keflex which improved the symptoms. Patient states it is not currently symptomatic.

## 2019-07-08 NOTE — PROCEDURE: ADDITIONAL NOTES
Additional Notes: Patient can call office if inflamed or symptomatic. \\nDiscussed possible treatment with Excellent.
Detail Level: Detailed

## 2019-07-11 ENCOUNTER — TELEPHONE (OUTPATIENT)
Dept: MEDICAL GROUP | Facility: MEDICAL CENTER | Age: 74
End: 2019-07-11

## 2019-07-11 ENCOUNTER — HOSPITAL ENCOUNTER (OUTPATIENT)
Dept: LAB | Facility: MEDICAL CENTER | Age: 74
End: 2019-07-11
Attending: FAMILY MEDICINE
Payer: MEDICARE

## 2019-07-11 ENCOUNTER — HOSPITAL ENCOUNTER (OUTPATIENT)
Dept: LAB | Facility: MEDICAL CENTER | Age: 74
End: 2019-07-11
Attending: INTERNAL MEDICINE
Payer: MEDICARE

## 2019-07-11 DIAGNOSIS — I25.10 CORONARY ARTERY DISEASE INVOLVING NATIVE CORONARY ARTERY OF NATIVE HEART WITHOUT ANGINA PECTORIS: ICD-10-CM

## 2019-07-11 DIAGNOSIS — D50.0 IRON DEFICIENCY ANEMIA DUE TO CHRONIC BLOOD LOSS: ICD-10-CM

## 2019-07-11 DIAGNOSIS — E11.9 CONTROLLED TYPE 2 DIABETES MELLITUS WITHOUT COMPLICATION, WITHOUT LONG-TERM CURRENT USE OF INSULIN (HCC): Chronic | ICD-10-CM

## 2019-07-11 LAB
ANION GAP SERPL CALC-SCNC: 10 MMOL/L (ref 0–11.9)
BUN SERPL-MCNC: 19 MG/DL (ref 8–22)
CALCIUM SERPL-MCNC: 9.5 MG/DL (ref 8.5–10.5)
CHLORIDE SERPL-SCNC: 104 MMOL/L (ref 96–112)
CHOLEST SERPL-MCNC: 124 MG/DL (ref 100–199)
CO2 SERPL-SCNC: 27 MMOL/L (ref 20–33)
CREAT SERPL-MCNC: 0.78 MG/DL (ref 0.5–1.4)
ERYTHROCYTE [DISTWIDTH] IN BLOOD BY AUTOMATED COUNT: 45.4 FL (ref 35.9–50)
EST. AVERAGE GLUCOSE BLD GHB EST-MCNC: 134 MG/DL
FASTING STATUS PATIENT QL REPORTED: NORMAL
FASTING STATUS PATIENT QL REPORTED: NORMAL
GLUCOSE SERPL-MCNC: 125 MG/DL (ref 65–99)
HBA1C MFR BLD: 6.3 % (ref 0–5.6)
HCT VFR BLD AUTO: 46.8 % (ref 42–52)
HDLC SERPL-MCNC: 49 MG/DL
HGB BLD-MCNC: 15.6 G/DL (ref 14–18)
LDLC SERPL CALC-MCNC: 53 MG/DL
MCH RBC QN AUTO: 31.2 PG (ref 27–33)
MCHC RBC AUTO-ENTMCNC: 33.3 G/DL (ref 33.7–35.3)
MCV RBC AUTO: 93.6 FL (ref 81.4–97.8)
PLATELET # BLD AUTO: 166 K/UL (ref 164–446)
PMV BLD AUTO: 10.8 FL (ref 9–12.9)
POTASSIUM SERPL-SCNC: 4 MMOL/L (ref 3.6–5.5)
RBC # BLD AUTO: 5 M/UL (ref 4.7–6.1)
SODIUM SERPL-SCNC: 141 MMOL/L (ref 135–145)
TRIGL SERPL-MCNC: 108 MG/DL (ref 0–149)
WBC # BLD AUTO: 7 K/UL (ref 4.8–10.8)

## 2019-07-11 PROCEDURE — 83036 HEMOGLOBIN GLYCOSYLATED A1C: CPT | Mod: GA

## 2019-07-11 PROCEDURE — 36415 COLL VENOUS BLD VENIPUNCTURE: CPT

## 2019-07-11 PROCEDURE — 85027 COMPLETE CBC AUTOMATED: CPT

## 2019-07-11 PROCEDURE — 80048 BASIC METABOLIC PNL TOTAL CA: CPT

## 2019-07-11 PROCEDURE — 80061 LIPID PANEL: CPT

## 2019-07-11 NOTE — TELEPHONE ENCOUNTER
----- Message from Reid Wills M.D. sent at 7/11/2019  1:32 PM PDT -----  Please notify patient that his blood sugar has improved and his cholesterol is well controlled.  He should follow-up in 6 months.  Reid Wills M.D.

## 2019-07-11 NOTE — TELEPHONE ENCOUNTER
Phone Number Called: [phone] (home)       Call outcome: spoke to patient regarding message below    Message: Patient notified of results.

## 2019-07-22 ENCOUNTER — OFFICE VISIT (OUTPATIENT)
Dept: CARDIOLOGY | Facility: MEDICAL CENTER | Age: 74
End: 2019-07-22
Payer: MEDICARE

## 2019-07-22 VITALS
BODY MASS INDEX: 26.24 KG/M2 | WEIGHT: 198 LBS | OXYGEN SATURATION: 94 % | SYSTOLIC BLOOD PRESSURE: 160 MMHG | DIASTOLIC BLOOD PRESSURE: 80 MMHG | HEIGHT: 73 IN | HEART RATE: 50 BPM

## 2019-07-22 DIAGNOSIS — R00.1 BRADYCARDIA: ICD-10-CM

## 2019-07-22 DIAGNOSIS — I25.10 CORONARY ARTERY DISEASE INVOLVING NATIVE CORONARY ARTERY OF NATIVE HEART WITHOUT ANGINA PECTORIS: ICD-10-CM

## 2019-07-22 DIAGNOSIS — E11.9 CONTROLLED TYPE 2 DIABETES MELLITUS WITHOUT COMPLICATION, WITHOUT LONG-TERM CURRENT USE OF INSULIN (HCC): ICD-10-CM

## 2019-07-22 DIAGNOSIS — R73.01 IFG (IMPAIRED FASTING GLUCOSE): ICD-10-CM

## 2019-07-22 DIAGNOSIS — E78.2 MIXED HYPERLIPIDEMIA: ICD-10-CM

## 2019-07-22 DIAGNOSIS — I25.2 HISTORY OF MI (MYOCARDIAL INFARCTION): ICD-10-CM

## 2019-07-22 DIAGNOSIS — I46.9 CARDIAC ARREST (HCC): ICD-10-CM

## 2019-07-22 DIAGNOSIS — D50.0 IRON DEFICIENCY ANEMIA DUE TO CHRONIC BLOOD LOSS: ICD-10-CM

## 2019-07-22 DIAGNOSIS — I47.20 VENTRICULAR TACHYCARDIA (HCC): ICD-10-CM

## 2019-07-22 DIAGNOSIS — N52.01 ERECTILE DYSFUNCTION DUE TO ARTERIAL INSUFFICIENCY: ICD-10-CM

## 2019-07-22 DIAGNOSIS — R94.5 ABNORMAL LIVER FUNCTION: ICD-10-CM

## 2019-07-22 DIAGNOSIS — Z95.820 S/P ANGIOPLASTY WITH STENT: ICD-10-CM

## 2019-07-22 DIAGNOSIS — I10 ESSENTIAL HYPERTENSION: ICD-10-CM

## 2019-07-22 PROCEDURE — 99214 OFFICE O/P EST MOD 30 MIN: CPT | Performed by: INTERNAL MEDICINE

## 2019-07-22 RX ORDER — CARVEDILOL 12.5 MG/1
12.5 TABLET ORAL 2 TIMES DAILY WITH MEALS
Qty: 180 TAB | Refills: 3 | Status: SHIPPED | OUTPATIENT
Start: 2019-07-22 | End: 2019-07-29 | Stop reason: SDUPTHER

## 2019-07-22 RX ORDER — LORATADINE 10 MG/1
10 TABLET ORAL DAILY
COMMUNITY
End: 2019-08-23

## 2019-07-22 ASSESSMENT — ENCOUNTER SYMPTOMS
BRUISES/BLEEDS EASILY: 1
BACK PAIN: 1

## 2019-07-22 NOTE — LETTER
[facility]   [address]   [address]  [place] [address]  Phone: [phone]  Fax: [phone]              Vinod Conrad  1945    Encounter Date: 7/22/2019    Sil Jacobo M.D.          PROGRESS NOTE:  Subjective:   Chief Complaint:   Chief Complaint   Patient presents with   • HTN (Controlled)       Vinod Conrad is a 74 y.o. male who returns for further management of coronary artery disease.  He had an anterior ST elevation MI August 15, 2018 and had a stent placed to the LAD (Ada, his wife's Bday). Was having arm and shoulder pain bilaterally for 2 days prior to presentation, finally brought to the ED, had cardiac arrest in the ED, went to the lab in arrest, had stent to LAD.  Came home, had dark stools a few days later, ended up with GI bleeding, back to the hospital, had ulcers that were clipped and started on PPI. No recurrence of VT during his stay or since.    He had some minimal disease in the RCA but otherwise patent vessels, EF was 45% in the Cath Lab. He was back in the emergency room 10/12/2018 with fluttering in his chest but all of his evaluation was reassuring.    LDL was 53, on Lipitor 80 mg.  LFTs normal.  His BP is still elevated today.  No issues with bleeding on DAPT.  No more bleeding, on PPI.  Hemoglobin and hematocrit normal.  Has impaired fasting glucose, hemoglobin A 1 C6 0.3.  HR slow but no symptoms.    He rides an exercise bike every day, 7 miles, stationary, recumbant.   He is not limited by chest pain, pressure or tightness. No significant dyspnea on exertion, orthopnea or lower extremity swelling.   No significant palpitations, dizziness, or presyncope/syncope.   No symptoms of leg claudication.   Angela pains, that are brief, took nitro once and didn't notice a difference.    DATA REVIEWED by me:  ECG 10/12/2018  Sinus bradycardia, rate 41, anterior and lateral T wave inversion    Echo  "8/17/2018  Mild LVH, EF 45%, akinesis of the distal septum and apex, no significant valve disease, RVSP 33 mmHg above right atrial pressure    Left heart catheterization 8/15/2018  Proximal 100% LAD occlusion, status post Xience 3.0 x 38 mm drug-eluting stent, left main free of disease, circumflex normal, proximal mid RCA with diffuse calcified atheromatous disease but patent, EF 45% with anterior apical and inferior apical wall motion abnormality    Chest x-ray 10/12/2018  No acute process    Here with Ada, his wife.    Most recent labs:     7/1/2019 LDL 53, HDL 49, to glycerides 180, total cholesterol 124, hemoglobin A1c 6.3, hemoglobin 15.6, platelet 166, sodium 141, potassium 4, creatinine 0.78    1/24/2019 LFTs normal    10/12/2018  Hemoglobin 13.5, platelet 172, sodium 138, potassium 3.7, creatinine 0.83, LFTs normal, troponin normal    8-16-18  , , HDL 55, LDL 51    1/24/2019 hemoglobin 15.6, platelet 201, sodium 139, potassium 4.3, creatinine 0.75, LFTs normal, hemoglobin A1c 6.6, total cholesterol 123, triglycerides 88, HDL 54 1, LDL 54      Past Medical History:   Diagnosis Date   • Allergy    • Anesthesia 2006    hallucinations post anesthesia, at home   • Arthritis     all joints   • CATARACT     bilateral IOL   • Hyperlipidemia    • Hypertension    • Infectious disease 2011    c diff   • Pain    • Unspecified hemorrhagic conditions 2006    \"abd bleeding\"     Past Surgical History:   Procedure Laterality Date   • GASTROSCOPY N/A 8/23/2018    Procedure: GASTROSCOPY;  Surgeon: Amari Juárez M.D.;  Location: SURGERY [facility];  Service: Gastroenterology   • CERVICAL FORAMINOTOMY  5/20/2013    Performed by Krsih Yang M.D. at SURGERY [facility]   • OTHER  2010    torn retina RIGHT EYE   • SCLERAL BUCKLING  1/13/2009    Performed by YVETTE DRAPER at SURGERY [facility] ORS   • OTHER ORTHOPEDIC SURGERY  2005    l knee     Family History   Problem Relation Age of Onset "   • Heart Failure Mother 78         at 78   • Heart Disease Brother 70        3 stents     Social History     Social History   • Marital status:      Spouse name: N/A   • Number of children: N/A   • Years of education: N/A     Occupational History   • Not on file.     Social History Main Topics   • Smoking status: Former Smoker     Packs/day: 2.00     Years: 30.00     Types: Cigarettes     Quit date: 1998   • Smokeless tobacco: Never Used   • Alcohol use 0.0 oz/week      Comment: 5 per week   • Drug use: No   • Sexual activity: Not on file     Other Topics Concern   • Not on file     Social History Narrative   • No narrative on file     Allergies   Allergen Reactions   • Aspirin Anaphylaxis     INTERNAL BLEEDING   • Augmentin Vomiting   • Clindamycin      Develop c.diff   • Metronidazole      Not sure   • Nsaids Unspecified     GI bleed   • Vancomycin Swelling   • Ace Inhibitors Cough       Current Outpatient Prescriptions   Medication Sig Dispense Refill   • loratadine (CLARITIN) 10 MG Tab Take 10 mg by mouth every day.     • carvedilol (COREG) 12.5 MG Tab Take 1 Tab by mouth 2 times a day, with meals. 180 Tab 3   • losartan (COZAAR) 50 MG Tab Take 1 Tab by mouth every day. 90 Tab 3   • atorvastatin (LIPITOR) 80 MG tablet Take 1 Tab by mouth every bedtime. 90 Tab 3   • omeprazole (PRILOSEC) 20 MG delayed-release capsule Take 1 Cap by mouth every day. 90 Cap 3   • acetaminophen (TYLENOL) 325 MG Tab Take 650 mg by mouth every four hours as needed.     • nitroglycerin (NITROSTAT) 0.4 MG SL Tab Place 1 Tab under tongue as needed for Chest Pain. Up to 3 every five minutes 25 Tab 11   • aspirin EC 81 MG EC tablet Take 1 Tab by mouth every day. 30 Tab 3     No current facility-administered medications for this visit.        Review of Systems   HENT: Positive for tinnitus.    Musculoskeletal: Positive for back pain and joint pain.   Endo/Heme/Allergies: Bruises/bleeds easily.     All others systems reviewed  "and negative.     Objective:     /80 (BP Location: Right arm, Patient Position: Sitting)   Pulse (!) 50   Ht 1.854 m (6' 1\")   Wt 89.8 kg (198 lb)   SpO2 94%  Body mass index is 26.12 kg/m².    Physical Exam   General: No acute distress. Well nourished.  HEENT: EOM grossly intact, no scleral icterus, no pharyngeal erythema.   Neck:  No JVD, no bruits, trachea midline  CVS: Slow, regular. Normal S1, S2. No M/R/G. No LE edema.  2+ radial pulses, 2+ DP pulses  Resp: CTAB. No wheezing or crackles/rhonchi. Normal respiratory effort.  Abdomen: Soft, NT, no aneudy hepatomegaly, well healed midline incision, calcified xyphoid process.  MSK/Ext: No clubbing or cyanosis.  Skin: Warm and dry, no rashes.  Neurological: CN III-XII grossly intact. No focal deficits.   Psych: A&O x 3, appropriate affect, good judgement    Physical exam performed today and unchanged compared to 2-1-19.      Assessment:     1. Coronary artery disease involving native coronary artery of native heart without angina pectoris     2. S/P angioplasty with stent     3. Essential hypertension     4. Cardiac arrest (HCC)     5. Ventricular tachycardia (HCC)     6. Abnormal liver function     7. Iron deficiency anemia due to chronic blood loss     8. Controlled type 2 diabetes mellitus without complication, without long-term current use of insulin (HCC)     9. History of MI (myocardial infarction)     10. IFG (impaired fasting glucose)     11. Erectile dysfunction due to arterial insufficiency     12. Mixed hyperlipidemia     13. Bradycardia         Medical Decision Making:  Today's Assessment / Status / Plan:     -ok to stop DAPT next month  -Monitor slow HR, no symptoms at present  -No need to check echo unless symptoms develop.  -Cont PRN nitro  -BP control  -CMP and Lipids  -See instructions  -RTC 1 year but he is to call with any changes or if BP not to goal    Written instructions given today:    -Stop Effient=Prasugrel after August 15, " 2019.  -Blood pressure goal under 130/80  -You can have your cuff calibrated in the office, just request Nurse visit  -Check BP only 1 time per day, or 5 times per week  -Stop metoprolol, start carvedilol, better at blood pressure control  -Start Carvedilol 12.5 mg AM and PM, if your BP is not at goal increase to 25 mg AM and PM and call for a new prescription (take 2 of the 12.5 mg AM and PM until gone).  Give yourself about 2 weeks to see if you improve  -If your BP is still not at goal, the next medication is Amlodipine, start at 2.5 mg daily, up to 10 mg daily.      Return in about 1 year (around 7/22/2020).    It is my pleasure to participate in the care of Mr. Conrad.  Please do not hesitate to contact me with questions or concerns.    Sil Jacobo MD, [facility]  Cardiologist [facility]    Please note that this dictation was created using voice recognition software. I have made every reasonable attempt to correct obvious errors, but it is possible there are errors of grammar and possibly content that I did not discover before finalizing the note.      Reid Wills M.D.  [address]  [id number]  [place] [id number]  VIA In Basket

## 2019-07-22 NOTE — PATIENT INSTRUCTIONS
-Stop Effient=Prasugrel after August 15, 2019.  -Blood pressure goal under 130/80  -You can have your cuff calibrated in the office, just request Nurse visit  -Check BP only 1 time per day, or 5 times per week  -Stop metoprolol, start carvedilol, better at blood pressure control  -Start Carvedilol 12.5 mg AM and PM, if your BP is not at goal increase to 25 mg AM and PM and call for a new prescription (take 2 of the 12.5 mg AM and PM until gone).  Give yourself about 2 weeks to see if you improve  -If your BP is still not at goal, the next medication is Amlodipine, start at 2.5 mg daily, up to 10 mg daily.

## 2019-07-22 NOTE — PROGRESS NOTES
Subjective:   Chief Complaint:   Chief Complaint   Patient presents with   • HTN (Controlled)       Vinod Conrad is a 74 y.o. male who returns for further management of coronary artery disease.  He had an anterior ST elevation MI August 15, 2018 and had a stent placed to the LAD (Ada, his wife's Bday). Was having arm and shoulder pain bilaterally for 2 days prior to presentation, finally brought to the ED, had cardiac arrest in the ED, went to the lab in arrest, had stent to LAD.  Came home, had dark stools a few days later, ended up with GI bleeding, back to the hospital, had ulcers that were clipped and started on PPI. No recurrence of VT during his stay or since.    He had some minimal disease in the RCA but otherwise patent vessels, EF was 45% in the Cath Lab. He was back in the emergency room 10/12/2018 with fluttering in his chest but all of his evaluation was reassuring.    LDL was 53, on Lipitor 80 mg.  LFTs normal.  His BP is still elevated today.  No issues with bleeding on DAPT.  No more bleeding, on PPI.  Hemoglobin and hematocrit normal.  Has impaired fasting glucose, hemoglobin A 1 C6 0.3.  HR slow but no symptoms.    He rides an exercise bike every day, 7 miles, stationary, recumbant.   He is not limited by chest pain, pressure or tightness. No significant dyspnea on exertion, orthopnea or lower extremity swelling.   No significant palpitations, dizziness, or presyncope/syncope.   No symptoms of leg claudication.   Angela pains, that are brief, took nitro once and didn't notice a difference.    DATA REVIEWED by me:  ECG 10/12/2018  Sinus bradycardia, rate 41, anterior and lateral T wave inversion    Echo 8/17/2018  Mild LVH, EF 45%, akinesis of the distal septum and apex, no significant valve disease, RVSP 33 mmHg above right atrial pressure    Left heart catheterization 8/15/2018  Proximal 100% LAD occlusion, status post Xience 3.0 x 38 mm drug-eluting stent, left main free of disease,  "circumflex normal, proximal mid RCA with diffuse calcified atheromatous disease but patent, EF 45% with anterior apical and inferior apical wall motion abnormality    Chest x-ray 10/12/2018  No acute process    Here with Ada, his wife.    Most recent labs:     2019 LDL 53, HDL 49, to glycerides 180, total cholesterol 124, hemoglobin A1c 6.3, hemoglobin 15.6, platelet 166, sodium 141, potassium 4, creatinine 0.78    2019 LFTs normal    10/12/2018  Hemoglobin 13.5, platelet 172, sodium 138, potassium 3.7, creatinine 0.83, LFTs normal, troponin normal    18  , , HDL 55, LDL 51    2019 hemoglobin 15.6, platelet 201, sodium 139, potassium 4.3, creatinine 0.75, LFTs normal, hemoglobin A1c 6.6, total cholesterol 123, triglycerides 88, HDL 54 1, LDL 54      Past Medical History:   Diagnosis Date   • Allergy    • Anesthesia     hallucinations post anesthesia, at home   • Arthritis     all joints   • CATARACT     bilateral IOL   • Hyperlipidemia    • Hypertension    • Infectious disease     c diff   • Pain    • Unspecified hemorrhagic conditions     \"abd bleeding\"     Past Surgical History:   Procedure Laterality Date   • GASTROSCOPY N/A 2018    Procedure: GASTROSCOPY;  Surgeon: Amari Juárez M.D.;  Location: SURGERY [facility];  Service: Gastroenterology   • CERVICAL FORAMINOTOMY  2013    Performed by Krish Yang M.D. at SURGERY [facility]   • OTHER      torn retina RIGHT EYE   • SCLERAL BUCKLING  2009    Performed by YVETTE DRAPER at SURGERY SAME DAY [facility] ORS   • OTHER ORTHOPEDIC SURGERY      l knee     Family History   Problem Relation Age of Onset   • Heart Failure Mother 78         at 78   • Heart Disease Brother 70        3 stents     Social History     Social History   • Marital status:      Spouse name: N/A   • Number of children: N/A   • Years of education: N/A     Occupational History   • Not on file.     Social " "History Main Topics   • Smoking status: Former Smoker     Packs/day: 2.00     Years: 30.00     Types: Cigarettes     Quit date: 1/1/1998   • Smokeless tobacco: Never Used   • Alcohol use 0.0 oz/week      Comment: 5 per week   • Drug use: No   • Sexual activity: Not on file     Other Topics Concern   • Not on file     Social History Narrative   • No narrative on file     Allergies   Allergen Reactions   • Aspirin Anaphylaxis     INTERNAL BLEEDING   • Augmentin Vomiting   • Clindamycin      Develop c.diff   • Metronidazole      Not sure   • Nsaids Unspecified     GI bleed   • Vancomycin Swelling   • Ace Inhibitors Cough       Current Outpatient Prescriptions   Medication Sig Dispense Refill   • loratadine (CLARITIN) 10 MG Tab Take 10 mg by mouth every day.     • carvedilol (COREG) 12.5 MG Tab Take 1 Tab by mouth 2 times a day, with meals. 180 Tab 3   • losartan (COZAAR) 50 MG Tab Take 1 Tab by mouth every day. 90 Tab 3   • atorvastatin (LIPITOR) 80 MG tablet Take 1 Tab by mouth every bedtime. 90 Tab 3   • omeprazole (PRILOSEC) 20 MG delayed-release capsule Take 1 Cap by mouth every day. 90 Cap 3   • acetaminophen (TYLENOL) 325 MG Tab Take 650 mg by mouth every four hours as needed.     • nitroglycerin (NITROSTAT) 0.4 MG SL Tab Place 1 Tab under tongue as needed for Chest Pain. Up to 3 every five minutes 25 Tab 11   • aspirin EC 81 MG EC tablet Take 1 Tab by mouth every day. 30 Tab 3     No current facility-administered medications for this visit.        Review of Systems   HENT: Positive for tinnitus.    Musculoskeletal: Positive for back pain and joint pain.   Endo/Heme/Allergies: Bruises/bleeds easily.     All others systems reviewed and negative.     Objective:     /80 (BP Location: Right arm, Patient Position: Sitting)   Pulse (!) 50   Ht 1.854 m (6' 1\")   Wt 89.8 kg (198 lb)   SpO2 94%  Body mass index is 26.12 kg/m².    Physical Exam   General: No acute distress. Well nourished.  HEENT: EOM grossly " intact, no scleral icterus, no pharyngeal erythema.   Neck:  No JVD, no bruits, trachea midline  CVS: Slow, regular. Normal S1, S2. No M/R/G. No LE edema.  2+ radial pulses, 2+ DP pulses  Resp: CTAB. No wheezing or crackles/rhonchi. Normal respiratory effort.  Abdomen: Soft, NT, no aneudy hepatomegaly, well healed midline incision, calcified xyphoid process.  MSK/Ext: No clubbing or cyanosis.  Skin: Warm and dry, no rashes.  Neurological: CN III-XII grossly intact. No focal deficits.   Psych: A&O x 3, appropriate affect, good judgement    Physical exam performed today and unchanged compared to 2-1-19.      Assessment:     1. Coronary artery disease involving native coronary artery of native heart without angina pectoris     2. S/P angioplasty with stent     3. Essential hypertension     4. Cardiac arrest (HCC)     5. Ventricular tachycardia (HCC)     6. Abnormal liver function     7. Iron deficiency anemia due to chronic blood loss     8. Controlled type 2 diabetes mellitus without complication, without long-term current use of insulin (HCC)     9. History of MI (myocardial infarction)     10. IFG (impaired fasting glucose)     11. Erectile dysfunction due to arterial insufficiency     12. Mixed hyperlipidemia     13. Bradycardia         Medical Decision Making:  Today's Assessment / Status / Plan:     -ok to stop DAPT next month  -Monitor slow HR, no symptoms at present  -No need to check echo unless symptoms develop.  -Cont PRN nitro  -BP control  -CMP and Lipids  -See instructions  -RTC 1 year but he is to call with any changes or if BP not to goal    Written instructions given today:    -Stop Effient=Prasugrel after August 15, 2019.  -Blood pressure goal under 130/80  -You can have your cuff calibrated in the office, just request Nurse visit  -Check BP only 1 time per day, or 5 times per week  -Stop metoprolol, start carvedilol, better at blood pressure control  -Start Carvedilol 12.5 mg AM and PM, if your BP is  not at goal increase to 25 mg AM and PM and call for a new prescription (take 2 of the 12.5 mg AM and PM until gone).  Give yourself about 2 weeks to see if you improve  -If your BP is still not at goal, the next medication is Amlodipine, start at 2.5 mg daily, up to 10 mg daily.      Return in about 1 year (around 7/22/2020).    It is my pleasure to participate in the care of Mr. Conrad.  Please do not hesitate to contact me with questions or concerns.    Sil Jacobo MD, [facility]  Cardiologist [facility]    Please note that this dictation was created using voice recognition software. I have made every reasonable attempt to correct obvious errors, but it is possible there are errors of grammar and possibly content that I did not discover before finalizing the note.

## 2019-07-23 ENCOUNTER — TELEPHONE (OUTPATIENT)
Dept: CARDIOLOGY | Facility: MEDICAL CENTER | Age: 74
End: 2019-07-23

## 2019-07-23 NOTE — TELEPHONE ENCOUNTER
HENRY/Jenn      Patient was put on Carvedilol yesterday. He has a question before he takes his first dose this morning. Ph. #[phone].

## 2019-07-29 ENCOUNTER — TELEPHONE (OUTPATIENT)
Dept: CARDIOLOGY | Facility: MEDICAL CENTER | Age: 74
End: 2019-07-29

## 2019-07-29 DIAGNOSIS — I10 ESSENTIAL HYPERTENSION: Chronic | ICD-10-CM

## 2019-07-29 RX ORDER — CARVEDILOL 25 MG/1
25 TABLET ORAL 2 TIMES DAILY WITH MEALS
Qty: 180 TAB | Refills: 3 | Status: SHIPPED | OUTPATIENT
Start: 2019-07-29 | End: 2019-08-05

## 2019-07-29 NOTE — TELEPHONE ENCOUNTER
WALK IN BP CK   Received: Today   Message Contents   Maribeth Osborne, David Ass't  Jenn Segura R.N.             Good Morning Jenn,   This  Pt walked in to office today, He has been trying to get a hold of someone for 3 days (pt stated) please review BP log in Media.     He is wondering if he should increase new RX. Please call him today.     Thank You,   Maribeth     BP log reviewed in media.  BP remains high  -190s    Explained tried to contact patient previously.  Denies any symptoms.    Per OV note, increase carvedilol to 25mg BID.  New Rx submitted.  Advised patient to avoid sodium, monitor fluid intake.  Contact clinic on Friday to report BP before the weekend. Patient verbalizes understanding.

## 2019-07-31 ENCOUNTER — TELEPHONE (OUTPATIENT)
Dept: CARDIOLOGY | Facility: MEDICAL CENTER | Age: 74
End: 2019-07-31

## 2019-08-02 ENCOUNTER — TELEPHONE (OUTPATIENT)
Dept: CARDIOLOGY | Facility: MEDICAL CENTER | Age: 74
End: 2019-08-02

## 2019-08-02 DIAGNOSIS — I10 ESSENTIAL HYPERTENSION: ICD-10-CM

## 2019-08-02 DIAGNOSIS — I25.10 CORONARY ARTERY DISEASE INVOLVING NATIVE CORONARY ARTERY OF NATIVE HEART WITHOUT ANGINA PECTORIS: ICD-10-CM

## 2019-08-02 NOTE — TELEPHONE ENCOUNTER
Patient called to discuss an appointment he had today but didn't know what it was.  Explained it was a BP check.  He states he had a BP check on Wednesday at  which he brought his own BP machine for comparison.     Clinic BP: 160/80  Patient machine: 168/88    Explained patient's BP most likely a few points higher.  Trending is the most important information at this point.  Patient denies any symptoms.     Advised to continue the increased dose of Carvedilol 25 mg BID, patient started this higher dose on Tuesday.  Continue this dose for 4 more days and follow previous advise (in previous note).  Call on Tuesday to report BP next 4 days so patient is on medication for one week total.  Patient agreeable and verbalizes understanding.

## 2019-08-05 RX ORDER — LABETALOL 200 MG/1
200 TABLET, FILM COATED ORAL 2 TIMES DAILY
Qty: 60 TAB | Refills: 3 | Status: SHIPPED | OUTPATIENT
Start: 2019-08-05 | End: 2019-08-21

## 2019-08-05 NOTE — TELEPHONE ENCOUNTER
His HR was slow before, I don't think I am going to get his BP down with enough metoprolol.    Ask him to start labetalol 200 mg BID, then increase to 400 mg BID if his BP is still over 160/90 in 5 days.  Send Rx if he agrees.    Tx,  LS

## 2019-08-05 NOTE — TELEPHONE ENCOUNTER
"Patient left a VM on RN phone.     Contacted patient.     Patient reports developing anxiousness since starting the Carvedilol. Since the increase of Carvedilol the anxiousness has worsened and BP has actually increased. He states he did not have this symptom with Metoprolol.     Recent BP values patient reports:    166/87  184/94  181/111    He does report \"a tiny bit\" of CP.  He states this is not new.  He reports a \"quick tiny shooting pain\".  He states he rode a bike for 6 miles yesterday and he had no CP or SOB.  Denies arm/jaw pain.  Denies edema or headaches.     Per OV add Amlodipine 2.5 mg and titrate up to 10 mg if needed for increasing BP. Will seek further advice from LS regarding carvedilol.     To LS - please advise. Thank you.     "

## 2019-08-06 NOTE — TELEPHONE ENCOUNTER
Contacted patient, discussed switching meds to Labetalol as directed by LS.  Patient is agreeable.  Advised to monitor BP for one week and report BPs or call clinic for any concerns or questions.   Patient verbalized understanding.     Labetalol 200 mg BID sent to patient's [facility] pharmacy. MAR updated.

## 2019-08-08 ENCOUNTER — TELEPHONE (OUTPATIENT)
Dept: CARDIOLOGY | Facility: MEDICAL CENTER | Age: 74
End: 2019-08-08

## 2019-08-16 ENCOUNTER — NON-PROVIDER VISIT (OUTPATIENT)
Dept: CARDIOLOGY | Facility: MEDICAL CENTER | Age: 74
End: 2019-08-16
Payer: MEDICARE

## 2019-08-16 VITALS — SYSTOLIC BLOOD PRESSURE: 152 MMHG | DIASTOLIC BLOOD PRESSURE: 92 MMHG | HEART RATE: 50 BPM | OXYGEN SATURATION: 95 %

## 2019-08-21 ENCOUNTER — OFFICE VISIT (OUTPATIENT)
Dept: MEDICAL GROUP | Facility: MEDICAL CENTER | Age: 74
End: 2019-08-21
Payer: MEDICARE

## 2019-08-21 VITALS
SYSTOLIC BLOOD PRESSURE: 132 MMHG | HEART RATE: 55 BPM | OXYGEN SATURATION: 95 % | WEIGHT: 198 LBS | HEIGHT: 73 IN | BODY MASS INDEX: 26.24 KG/M2 | TEMPERATURE: 98.2 F | DIASTOLIC BLOOD PRESSURE: 62 MMHG

## 2019-08-21 DIAGNOSIS — I10 ESSENTIAL HYPERTENSION: Chronic | ICD-10-CM

## 2019-08-21 PROCEDURE — 99214 OFFICE O/P EST MOD 30 MIN: CPT | Performed by: FAMILY MEDICINE

## 2019-08-21 RX ORDER — AMLODIPINE BESYLATE 2.5 MG/1
2.5 TABLET ORAL DAILY
Qty: 30 TAB | Refills: 2 | Status: SHIPPED | OUTPATIENT
Start: 2019-08-21 | End: 2019-08-23

## 2019-08-21 RX ORDER — LOSARTAN POTASSIUM 100 MG/1
100 TABLET ORAL DAILY
Qty: 90 TAB | Refills: 3 | Status: SHIPPED | OUTPATIENT
Start: 2019-08-21 | End: 2020-01-01

## 2019-08-21 NOTE — PROGRESS NOTES
Subjective:   Vinod Conrad is a 74 y.o. male here today for hypertension    Hypertension  Has been having recent elevated blood pressure and being managed with cardiologist.  He was recently started on labetalol 200 mg twice daily, wife has noticed decreased sexual drive, bad taste in his mouth, headaches, confusion at night, increased irritability, especially in the middle of last night. Coreg caused anxiety. Metoprolol did not have side effects and he would like to go back to Metoprolol.  This morning blood pressure was 180s systolic.         Current medicines (including changes today)  Current Outpatient Medications   Medication Sig Dispense Refill   • metoprolol (LOPRESSOR) 25 MG Tab Take 1 Tab by mouth 2 times a day. 180 Tab 3   • losartan (COZAAR) 100 MG Tab Take 1 Tab by mouth every day. 90 Tab 3   • amLODIPine (NORVASC) 2.5 MG Tab Take 1 Tab by mouth every day. 30 Tab 2   • loratadine (CLARITIN) 10 MG Tab Take 10 mg by mouth every day.     • atorvastatin (LIPITOR) 80 MG tablet Take 1 Tab by mouth every bedtime. 90 Tab 3   • omeprazole (PRILOSEC) 20 MG delayed-release capsule Take 1 Cap by mouth every day. 90 Cap 3   • acetaminophen (TYLENOL) 325 MG Tab Take 650 mg by mouth every four hours as needed.     • nitroglycerin (NITROSTAT) 0.4 MG SL Tab Place 1 Tab under tongue as needed for Chest Pain. Up to 3 every five minutes 25 Tab 11   • aspirin EC 81 MG EC tablet Take 1 Tab by mouth every day. 30 Tab 3     No current facility-administered medications for this visit.      He  has a past medical history of Allergy, Anesthesia (2006), Arthritis, CATARACT, Hyperlipidemia, Hypertension, Infectious disease (2011), Pain, and Unspecified hemorrhagic conditions (2006). He also has no past medical history of ASTHMA.    ROS   No chest pain, no shortness of breath       Objective:     /62 (BP Location: Right arm, Patient Position: Sitting, BP Cuff Size: Adult)   Pulse (!) 55   Temp 36.8 °C (98.2 °F)   Ht  "1.854 m (6' 1\")   Wt 89.8 kg (198 lb)   SpO2 95%  Body mass index is 26.12 kg/m².   Physical Exam:  Constitutional: Alert, no distress.  Skin: Warm, dry, good turgor, no rashes in visible areas.  Eye: Equal, round and reactive, conjunctiva clear, lids normal.  ENMT: Lips without lesions, good dentition, oropharynx clear.  Psych: Alert and oriented x3, normal affect and mood.        Assessment and Plan:   The following treatment plan was discussed    1. Essential hypertension  Reviewed blood pressure readings with patient.  Discussed medication side effects.  We will start patient back on metoprolol 25 mg twice daily.  I will raise his losartan from 50 mg daily to 100 mg daily.  I will also start amlodipine 2.5 mg daily.  He will continue monitoring his blood pressure closely and follow-up with cardiologist in 2 days.  - metoprolol (LOPRESSOR) 25 MG Tab; Take 1 Tab by mouth 2 times a day.  Dispense: 180 Tab; Refill: 3  - losartan (COZAAR) 100 MG Tab; Take 1 Tab by mouth every day.  Dispense: 90 Tab; Refill: 3  - amLODIPine (NORVASC) 2.5 MG Tab; Take 1 Tab by mouth every day.  Dispense: 30 Tab; Refill: 2      Followup: Return if symptoms worsen or fail to improve.       Total 20 minutes face-to-face time spent with patient, with greater than 50% of the total time discussing patient's issues and symptoms as listed above in assessment and plan, as well as managing coordination of care for future evaluation and treatment.    "

## 2019-08-21 NOTE — ASSESSMENT & PLAN NOTE
Has been having recent elevated blood pressure and being managed with cardiologist.  He was recently started on labetalol 200 mg twice daily, wife has noticed decreased sexual drive, bad taste in his mouth, headaches, confusion at night, increased irritability, especially in the middle of last night. Coreg caused anxiety. Metoprolol did not have side effects and he would like to go back to Metoprolol.  This morning blood pressure was 180s systolic.

## 2019-08-23 ENCOUNTER — OFFICE VISIT (OUTPATIENT)
Dept: CARDIOLOGY | Facility: MEDICAL CENTER | Age: 74
End: 2019-08-23
Payer: MEDICARE

## 2019-08-23 VITALS
DIASTOLIC BLOOD PRESSURE: 82 MMHG | HEART RATE: 56 BPM | OXYGEN SATURATION: 94 % | BODY MASS INDEX: 26.24 KG/M2 | SYSTOLIC BLOOD PRESSURE: 152 MMHG | WEIGHT: 198 LBS | HEIGHT: 73 IN

## 2019-08-23 DIAGNOSIS — Z95.820 S/P ANGIOPLASTY WITH STENT: ICD-10-CM

## 2019-08-23 DIAGNOSIS — R94.5 ABNORMAL LIVER FUNCTION: ICD-10-CM

## 2019-08-23 DIAGNOSIS — E11.9 CONTROLLED TYPE 2 DIABETES MELLITUS WITHOUT COMPLICATION, WITHOUT LONG-TERM CURRENT USE OF INSULIN (HCC): ICD-10-CM

## 2019-08-23 DIAGNOSIS — R73.01 IFG (IMPAIRED FASTING GLUCOSE): ICD-10-CM

## 2019-08-23 DIAGNOSIS — N52.01 ERECTILE DYSFUNCTION DUE TO ARTERIAL INSUFFICIENCY: ICD-10-CM

## 2019-08-23 DIAGNOSIS — D50.0 IRON DEFICIENCY ANEMIA DUE TO CHRONIC BLOOD LOSS: ICD-10-CM

## 2019-08-23 DIAGNOSIS — I21.02 ST ELEVATION MYOCARDIAL INFARCTION INVOLVING LEFT ANTERIOR DESCENDING (LAD) CORONARY ARTERY (HCC): ICD-10-CM

## 2019-08-23 DIAGNOSIS — I25.10 CORONARY ARTERY DISEASE INVOLVING NATIVE CORONARY ARTERY OF NATIVE HEART WITHOUT ANGINA PECTORIS: ICD-10-CM

## 2019-08-23 DIAGNOSIS — I10 ESSENTIAL HYPERTENSION: ICD-10-CM

## 2019-08-23 DIAGNOSIS — I47.20 VENTRICULAR TACHYCARDIA (HCC): ICD-10-CM

## 2019-08-23 DIAGNOSIS — I46.9 CARDIAC ARREST (HCC): ICD-10-CM

## 2019-08-23 PROCEDURE — 99214 OFFICE O/P EST MOD 30 MIN: CPT | Performed by: INTERNAL MEDICINE

## 2019-08-23 RX ORDER — AMLODIPINE BESYLATE 5 MG/1
7.5 TABLET ORAL DAILY
Qty: 135 TAB | Refills: 3 | Status: SHIPPED | OUTPATIENT
Start: 2019-08-23 | End: 2019-09-12

## 2019-08-23 ASSESSMENT — ENCOUNTER SYMPTOMS
BACK PAIN: 1
BRUISES/BLEEDS EASILY: 1

## 2019-08-23 NOTE — LETTER
[facility]   [address]   [address]  [place] [address]  Phone: [phone]  Fax: [phone]              Vinod Conrad  1945    Encounter Date: 8/23/2019    Sil Jacobo M.D.          PROGRESS NOTE:  Subjective:   Chief Complaint:   Chief Complaint   Patient presents with   • HTN (Controlled)       Vinod Conrad is a 74 y.o. male who returns for further management of coronary artery disease and hypertension.  He had an anterior ST elevation MI August 15, 2018 and had a stent placed to the LAD (Ada, his wife's Bday). Was having arm and shoulder pain bilaterally for 2 days prior to presentation, finally brought to the ED, had cardiac arrest in the ED, went to the lab in arrest, had stent to LAD.  He had some minimal disease in the RCA but otherwise patent vessels, EF was 45% in the Cath Lab.     Came home, had dark stools a few days later, ended up with GI bleeding, back to the hospital, had ulcers that were clipped and started on PPI. He had had prior GI bleeding about 10 year prior, had abd surgery and still could not find the bleeding.    No recurrence of VT during his stay or since.    He was back in the emergency room 10/12/2018 with fluttering in his chest but all of his evaluation was reassuring.    LDL was 53, on Lipitor 80 mg.  LFTs normal.    His BP remains elevated.  We switch metoprolol to carvedilol because of his low heart rate, he felt worse on carvedilol so he tried labetalol but he also did not feel well so he went back to metoprolol.  Coreg made him feel dizzy, scalp tingling.  Labetalol caused sleep disturbance and confusion.    We added amlodipine with his PCP this past Wednesday.  His BP has been up to 181 and even 200 in the morning, better after BP meds, this includes amlodipine and BB at night.  Does not have features of JOSHUA.  High BP has made him anxious, was able to get in with PCP to start  amlodipine 2.5 mg but BP still up.    He completed dual antiplatelet therapy and now on aspirin alone.  No more bleeding, on PPI.  Has impaired fasting glucose, hemoglobin A 1 C6 0.3.  HR slow but no symptoms.    He rides an exercise bike every day, 7 miles, stationary, recumbant.   He is not limited by chest pain, pressure or tightness. No significant dyspnea on exertion, orthopnea or lower extremity swelling.   No significant palpitations, dizziness, or presyncope/syncope.   No symptoms of leg claudication.   Angela pains, that are brief, took nitro once and didn't notice a difference.    Here with Ada, his wife.    DATA REVIEWED by me:  ECG 10/12/2018  Sinus bradycardia, rate 41, anterior and lateral T wave inversion    Echo 8/17/2018  Mild LVH, EF 45%, akinesis of the distal septum and apex, no significant valve disease, RVSP 33 mmHg above right atrial pressure    Left heart catheterization 8/15/2018  Proximal 100% LAD occlusion, status post Xience 3.0 x 38 mm drug-eluting stent, left main free of disease, circumflex normal, proximal mid RCA with diffuse calcified atheromatous disease but patent, EF 45% with anterior apical and inferior apical wall motion abnormality    Chest x-ray 10/12/2018  No acute process      Most recent labs:     7/1/2019 LDL 53, HDL 49, to glycerides 180, total cholesterol 124, hemoglobin A1c 6.3, hemoglobin 15.6, platelet 166, sodium 141, potassium 4, creatinine 0.78    1/24/2019 LFTs normal    10/12/2018  Hemoglobin 13.5, platelet 172, sodium 138, potassium 3.7, creatinine 0.83, LFTs normal, troponin normal    8-16-18  , , HDL 55, LDL 51    1/24/2019 hemoglobin 15.6, platelet 201, sodium 139, potassium 4.3, creatinine 0.75, LFTs normal, hemoglobin A1c 6.6, total cholesterol 123, triglycerides 88, HDL 54 1, LDL 54      Past Medical History:   Diagnosis Date   • Allergy    • Anesthesia 2006    hallucinations post anesthesia, at home   • Arthritis     all joints   • CATARACT     "   bilateral IOL   • Hyperlipidemia    • Hypertension    • Infectious disease     c diff   • Pain    • Unspecified hemorrhagic conditions     \"abd bleeding\"     Past Surgical History:   Procedure Laterality Date   • GASTROSCOPY N/A 2018    Procedure: GASTROSCOPY;  Surgeon: Amari Juárez M.D.;  Location: SURGERY [facility];  Service: Gastroenterology   • CERVICAL FORAMINOTOMY  2013    Performed by Krish Yang M.D. at SURGERY [facility]   • OTHER      torn retina RIGHT EYE   • SCLERAL BUCKLING  2009    Performed by YVETTE DRAPER at SURGERY SAME DAY [facility]S   • OTHER ORTHOPEDIC SURGERY      l knee     Family History   Problem Relation Age of Onset   • Heart Failure Mother 78         at 78   • Heart Disease Brother 70        3 stents     Social History     Socioeconomic History   • Marital status:      Spouse name: Not on file   • Number of children: Not on file   • Years of education: Not on file   • Highest education level: Not on file   Occupational History   • Not on file   Social Needs   • Financial resource strain: Not on file   • Food insecurity:     Worry: Not on file     Inability: Not on file   • Transportation needs:     Medical: Not on file     Non-medical: Not on file   Tobacco Use   • Smoking status: Former Smoker     Packs/day: 2.00     Years: 30.00     Pack years: 60.00     Types: Cigarettes     Last attempt to quit: 1998     Years since quittin.6   • Smokeless tobacco: Never Used   Substance and Sexual Activity   • Alcohol use: Yes     Alcohol/week: 0.0 oz     Comment: 5 per week   • Drug use: No   • Sexual activity: Not on file   Lifestyle   • Physical activity:     Days per week: Not on file     Minutes per session: Not on file   • Stress: Not on file   Relationships   • Social connections:     Talks on phone: Not on file     Gets together: Not on file     Attends Taoist service: Not on file     Active member of club or " "organization: Not on file     Attends meetings of clubs or organizations: Not on file     Relationship status: Not on file   • Intimate partner violence:     Fear of current or ex partner: Not on file     Emotionally abused: Not on file     Physically abused: Not on file     Forced sexual activity: Not on file   Other Topics Concern   • Not on file   Social History Narrative   • Not on file     Allergies   Allergen Reactions   • Aspirin      INTERNAL BLEEDING, 325 mg   • Augmentin Vomiting   • Clindamycin      Develop c.diff   • Metronidazole      Not sure   • Nsaids Unspecified     GI bleed   • Vancomycin Swelling   • Ace Inhibitors Cough       Current Outpatient Medications   Medication Sig Dispense Refill   • metoprolol (LOPRESSOR) 25 MG Tab Take 1.5 Tabs by mouth 2 times a day. 270 Tab 3   • amLODIPine (NORVASC) 5 MG Tab Take 1.5 Tabs by mouth every day. 135 Tab 3   • losartan (COZAAR) 100 MG Tab Take 1 Tab by mouth every day. 90 Tab 3   • atorvastatin (LIPITOR) 80 MG tablet Take 1 Tab by mouth every bedtime. 90 Tab 3   • omeprazole (PRILOSEC) 20 MG delayed-release capsule Take 1 Cap by mouth every day. 90 Cap 3   • acetaminophen (TYLENOL) 325 MG Tab Take 650 mg by mouth every four hours as needed.     • nitroglycerin (NITROSTAT) 0.4 MG SL Tab Place 1 Tab under tongue as needed for Chest Pain. Up to 3 every five minutes 25 Tab 11   • aspirin EC 81 MG EC tablet Take 1 Tab by mouth every day. 30 Tab 3     No current facility-administered medications for this visit.        Review of Systems   HENT: Positive for tinnitus.    Musculoskeletal: Positive for back pain and joint pain.   Endo/Heme/Allergies: Bruises/bleeds easily.     All others systems reviewed and negative.     Objective:     /82 (BP Location: Left arm, Patient Position: Sitting, BP Cuff Size: Adult)   Pulse (!) 56   Ht 1.854 m (6' 1\")   Wt 89.8 kg (198 lb)   SpO2 94%  Body mass index is 26.12 kg/m².    Physical Exam   General: No acute " distress. Well nourished.  HEENT: EOM grossly intact, no scleral icterus, no pharyngeal erythema.   Neck:  No JVD, no bruits, trachea midline  CVS: Slow, regular. Normal S1, S2. No M/R/G. No LE edema.  2+ radial pulses, 2+ DP pulses  Resp: CTAB. No wheezing or crackles/rhonchi. Normal respiratory effort.  Abdomen: Soft, NT, no aneudy hepatomegaly, well healed midline incision, calcified xyphoid process.  MSK/Ext: No clubbing or cyanosis.  Skin: Warm and dry, no rashes.  Neurological: CN III-XII grossly intact. No focal deficits.   Psych: A&O x 3, appropriate affect, good judgement    Physical exam performed today and unchanged compared to 7-22-19.      Assessment:     1. Coronary artery disease involving native coronary artery of native heart without angina pectoris     2. S/P angioplasty with stent     3. Essential hypertension     4. Cardiac arrest (HCC)     5. Ventricular tachycardia (HCC)     6. Abnormal liver function     7. Iron deficiency anemia due to chronic blood loss     8. Controlled type 2 diabetes mellitus without complication, without long-term current use of insulin (HCC)     9. IFG (impaired fasting glucose)     10. Erectile dysfunction due to arterial insufficiency     11. ST elevation myocardial infarction involving left anterior descending (LAD) coronary artery (HCC)         Medical Decision Making:  Today's Assessment / Status / Plan:     -He would be ready for one year visits except for his BP.  -Monitor slow HR, no symptoms at present  -Cont PRN nitro  -See instructions  -RTC 1 year but he is to call with any changes or if BP not to goal    Written instructions given today:    -Increase amlodipine from 2.5 mg to 7.5 mg, take at bed time.  You can take 3 of the 2.5 mg OR take a 5 mg (new Rx) with the old 2.5 mg OR take 1.5 tablets of the 5 mg.  -After 5 days, increase metoprolol from 25 mg AM and PM to 37.5 mg AM and PM, you will need to cut a 25 mg tablet in half to take 1.5 tablets AM and  PM.  -If the BP is still over 130/80, we will need to add another HCTZ 25 mg daily, in the morning, a mild diuretic.  -If the BP is consistently high in the morning vs the evening, we could have you cut your losartan in half, take 1/2 in the morning, half at night.  -Consider overnight pulse oximetry to screen for sleep apnea but it does not tell the whole story.  If I cannot get your BP controlled and you are still having high BP in the morning I might ask you to have a complete sleep study.    Return in about 3 months (around 11/23/2019), or Rosmery An 3 months, MD 6 months.    It is my pleasure to participate in the care of Mr. Conrad.  Please do not hesitate to contact me with questions or concerns.    Sil Jacobo MD, [facility]  Cardiologist [facility]    Please note that this dictation was created using voice recognition software. I have made every reasonable attempt to correct obvious errors, but it is possible there are errors of grammar and possibly content that I did not discover before finalizing the note.      Reid Wills M.D.  [address]  [id number]  [place] [id number]  VIA In Basket

## 2019-08-23 NOTE — PATIENT INSTRUCTIONS
-Increase amlodipine from 2.5 mg to 7.5 mg, take at bed time.  You can take 3 of the 2.5 mg OR take a 5 mg (new Rx) with the old 2.5 mg OR take 1.5 tablets of the 5 mg.  -After 5 days, increase metoprolol from 25 mg AM and PM to 37.5 mg AM and PM, you will need to cut a 25 mg tablet in half to take 1.5 tablets AM and PM.  -If the BP is still over 130/80, we will need to add another HCTZ 25 mg daily, in the morning, a mild diuretic.  -If the BP is consistently high in the morning vs the evening, we could have you cut your losartan in half, take 1/2 in the morning, half at night.  -Consider overnight pulse oximetry to screen for sleep apnea but it does not tell the whole story.  If I cannot get your BP controlled and you are still having high BP in the morning I might ask you to have a complete sleep study.

## 2019-08-23 NOTE — PROGRESS NOTES
Subjective:   Chief Complaint:   Chief Complaint   Patient presents with   • HTN (Controlled)       Vinod Conrad is a 74 y.o. male who returns for further management of coronary artery disease and hypertension.  He had an anterior ST elevation MI August 15, 2018 and had a stent placed to the LAD (Ada, his wife's Bday). Was having arm and shoulder pain bilaterally for 2 days prior to presentation, finally brought to the ED, had cardiac arrest in the ED, went to the lab in arrest, had stent to LAD.  He had some minimal disease in the RCA but otherwise patent vessels, EF was 45% in the Cath Lab.     Came home, had dark stools a few days later, ended up with GI bleeding, back to the hospital, had ulcers that were clipped and started on PPI. He had had prior GI bleeding about 10 year prior, had abd surgery and still could not find the bleeding.    No recurrence of VT during his stay or since.    He was back in the emergency room 10/12/2018 with fluttering in his chest but all of his evaluation was reassuring.    LDL was 53, on Lipitor 80 mg.  LFTs normal.    His BP remains elevated.  We switch metoprolol to carvedilol because of his low heart rate, he felt worse on carvedilol so he tried labetalol but he also did not feel well so he went back to metoprolol.  Coreg made him feel dizzy, scalp tingling.  Labetalol caused sleep disturbance and confusion.    We added amlodipine with his PCP this past Wednesday.  His BP has been up to 181 and even 200 in the morning, better after BP meds, this includes amlodipine and BB at night.  Does not have features of JOSHUA.  High BP has made him anxious, was able to get in with PCP to start amlodipine 2.5 mg but BP still up.    He completed dual antiplatelet therapy and now on aspirin alone.  No more bleeding, on PPI.  Has impaired fasting glucose, hemoglobin A 1 C6 0.3.  HR slow but no symptoms.    He rides an exercise bike every day, 7 miles, stationary, recumbant.   He is not  "limited by chest pain, pressure or tightness. No significant dyspnea on exertion, orthopnea or lower extremity swelling.   No significant palpitations, dizziness, or presyncope/syncope.   No symptoms of leg claudication.   Angela pains, that are brief, took nitro once and didn't notice a difference.    Here with Ada, his wife.    DATA REVIEWED by me:  ECG 10/12/2018  Sinus bradycardia, rate 41, anterior and lateral T wave inversion    Echo 8/17/2018  Mild LVH, EF 45%, akinesis of the distal septum and apex, no significant valve disease, RVSP 33 mmHg above right atrial pressure    Left heart catheterization 8/15/2018  Proximal 100% LAD occlusion, status post Xience 3.0 x 38 mm drug-eluting stent, left main free of disease, circumflex normal, proximal mid RCA with diffuse calcified atheromatous disease but patent, EF 45% with anterior apical and inferior apical wall motion abnormality    Chest x-ray 10/12/2018  No acute process      Most recent labs:     7/1/2019 LDL 53, HDL 49, to glycerides 180, total cholesterol 124, hemoglobin A1c 6.3, hemoglobin 15.6, platelet 166, sodium 141, potassium 4, creatinine 0.78    1/24/2019 LFTs normal    10/12/2018  Hemoglobin 13.5, platelet 172, sodium 138, potassium 3.7, creatinine 0.83, LFTs normal, troponin normal    8-16-18  , , HDL 55, LDL 51    1/24/2019 hemoglobin 15.6, platelet 201, sodium 139, potassium 4.3, creatinine 0.75, LFTs normal, hemoglobin A1c 6.6, total cholesterol 123, triglycerides 88, HDL 54 1, LDL 54      Past Medical History:   Diagnosis Date   • Allergy    • Anesthesia 2006    hallucinations post anesthesia, at home   • Arthritis     all joints   • CATARACT     bilateral IOL   • Hyperlipidemia    • Hypertension    • Infectious disease 2011    c diff   • Pain    • Unspecified hemorrhagic conditions 2006    \"abd bleeding\"     Past Surgical History:   Procedure Laterality Date   • GASTROSCOPY N/A 8/23/2018    Procedure: GASTROSCOPY;  Surgeon: Amari CASTAÑEDA" MISHA Juárez;  Location: SURGERY [facility];  Service: Gastroenterology   • CERVICAL FORAMINOTOMY  2013    Performed by Krish Yang M.D. at SURGERY [facility]   • OTHER  2010    torn retina RIGHT EYE   • SCLERAL BUCKLING  2009    Performed by YVETTE DRAPER at SURGERY SAME DAY [facility] ORS   • OTHER ORTHOPEDIC SURGERY      l knee     Family History   Problem Relation Age of Onset   • Heart Failure Mother 78         at 78   • Heart Disease Brother 70        3 stents     Social History     Socioeconomic History   • Marital status:      Spouse name: Not on file   • Number of children: Not on file   • Years of education: Not on file   • Highest education level: Not on file   Occupational History   • Not on file   Social Needs   • Financial resource strain: Not on file   • Food insecurity:     Worry: Not on file     Inability: Not on file   • Transportation needs:     Medical: Not on file     Non-medical: Not on file   Tobacco Use   • Smoking status: Former Smoker     Packs/day: 2.00     Years: 30.00     Pack years: 60.00     Types: Cigarettes     Last attempt to quit: 1998     Years since quittin.6   • Smokeless tobacco: Never Used   Substance and Sexual Activity   • Alcohol use: Yes     Alcohol/week: 0.0 oz     Comment: 5 per week   • Drug use: No   • Sexual activity: Not on file   Lifestyle   • Physical activity:     Days per week: Not on file     Minutes per session: Not on file   • Stress: Not on file   Relationships   • Social connections:     Talks on phone: Not on file     Gets together: Not on file     Attends Christianity service: Not on file     Active member of club or organization: Not on file     Attends meetings of clubs or organizations: Not on file     Relationship status: Not on file   • Intimate partner violence:     Fear of current or ex partner: Not on file     Emotionally abused: Not on file     Physically abused: Not on file     Forced sexual  "activity: Not on file   Other Topics Concern   • Not on file   Social History Narrative   • Not on file     Allergies   Allergen Reactions   • Aspirin      INTERNAL BLEEDING, 325 mg   • Augmentin Vomiting   • Clindamycin      Develop c.diff   • Metronidazole      Not sure   • Nsaids Unspecified     GI bleed   • Vancomycin Swelling   • Ace Inhibitors Cough       Current Outpatient Medications   Medication Sig Dispense Refill   • metoprolol (LOPRESSOR) 25 MG Tab Take 1.5 Tabs by mouth 2 times a day. 270 Tab 3   • amLODIPine (NORVASC) 5 MG Tab Take 1.5 Tabs by mouth every day. 135 Tab 3   • losartan (COZAAR) 100 MG Tab Take 1 Tab by mouth every day. 90 Tab 3   • atorvastatin (LIPITOR) 80 MG tablet Take 1 Tab by mouth every bedtime. 90 Tab 3   • omeprazole (PRILOSEC) 20 MG delayed-release capsule Take 1 Cap by mouth every day. 90 Cap 3   • acetaminophen (TYLENOL) 325 MG Tab Take 650 mg by mouth every four hours as needed.     • nitroglycerin (NITROSTAT) 0.4 MG SL Tab Place 1 Tab under tongue as needed for Chest Pain. Up to 3 every five minutes 25 Tab 11   • aspirin EC 81 MG EC tablet Take 1 Tab by mouth every day. 30 Tab 3     No current facility-administered medications for this visit.        Review of Systems   HENT: Positive for tinnitus.    Musculoskeletal: Positive for back pain and joint pain.   Endo/Heme/Allergies: Bruises/bleeds easily.     All others systems reviewed and negative.     Objective:     /82 (BP Location: Left arm, Patient Position: Sitting, BP Cuff Size: Adult)   Pulse (!) 56   Ht 1.854 m (6' 1\")   Wt 89.8 kg (198 lb)   SpO2 94%  Body mass index is 26.12 kg/m².    Physical Exam   General: No acute distress. Well nourished.  HEENT: EOM grossly intact, no scleral icterus, no pharyngeal erythema.   Neck:  No JVD, no bruits, trachea midline  CVS: Slow, regular. Normal S1, S2. No M/R/G. No LE edema.  2+ radial pulses, 2+ DP pulses  Resp: CTAB. No wheezing or crackles/rhonchi. Normal " respiratory effort.  Abdomen: Soft, NT, no aneudy hepatomegaly, well healed midline incision, calcified xyphoid process.  MSK/Ext: No clubbing or cyanosis.  Skin: Warm and dry, no rashes.  Neurological: CN III-XII grossly intact. No focal deficits.   Psych: A&O x 3, appropriate affect, good judgement    Physical exam performed today and unchanged compared to 7-22-19.      Assessment:     1. Coronary artery disease involving native coronary artery of native heart without angina pectoris     2. S/P angioplasty with stent     3. Essential hypertension     4. Cardiac arrest (HCC)     5. Ventricular tachycardia (HCC)     6. Abnormal liver function     7. Iron deficiency anemia due to chronic blood loss     8. Controlled type 2 diabetes mellitus without complication, without long-term current use of insulin (HCC)     9. IFG (impaired fasting glucose)     10. Erectile dysfunction due to arterial insufficiency     11. ST elevation myocardial infarction involving left anterior descending (LAD) coronary artery ([facility])         Medical Decision Making:  Today's Assessment / Status / Plan:     -He would be ready for one year visits except for his BP.  -Monitor slow HR, no symptoms at present  -Cont PRN nitro  -See instructions  -RTC 1 year but he is to call with any changes or if BP not to goal    Written instructions given today:    -Increase amlodipine from 2.5 mg to 7.5 mg, take at bed time.  You can take 3 of the 2.5 mg OR take a 5 mg (new Rx) with the old 2.5 mg OR take 1.5 tablets of the 5 mg.  -After 5 days, increase metoprolol from 25 mg AM and PM to 37.5 mg AM and PM, you will need to cut a 25 mg tablet in half to take 1.5 tablets AM and PM.  -If the BP is still over 130/80, we will need to add another HCTZ 25 mg daily, in the morning, a mild diuretic.  -If the BP is consistently high in the morning vs the evening, we could have you cut your losartan in half, take 1/2 in the morning, half at night.  -Consider overnight  pulse oximetry to screen for sleep apnea but it does not tell the whole story.  If I cannot get your BP controlled and you are still having high BP in the morning I might ask you to have a complete sleep study.    Return in about 3 months (around 11/23/2019), or Rosmery An 3 months, MD 6 months.    It is my pleasure to participate in the care of Mr. Conrad.  Please do not hesitate to contact me with questions or concerns.    Sil Jacobo MD, [facility]  Cardiologist [facility]    Please note that this dictation was created using voice recognition software. I have made every reasonable attempt to correct obvious errors, but it is possible there are errors of grammar and possibly content that I did not discover before finalizing the note.

## 2019-09-12 DIAGNOSIS — I10 ESSENTIAL HYPERTENSION: Chronic | ICD-10-CM

## 2019-09-12 RX ORDER — AMLODIPINE BESYLATE 2.5 MG/1
TABLET ORAL
Qty: 90 TAB | Refills: 3 | Status: SHIPPED | OUTPATIENT
Start: 2019-09-12 | End: 2019-11-12

## 2019-09-16 ENCOUNTER — TELEPHONE (OUTPATIENT)
Dept: CARDIOLOGY | Facility: MEDICAL CENTER | Age: 74
End: 2019-09-16

## 2019-09-16 DIAGNOSIS — I10 ESSENTIAL HYPERTENSION: ICD-10-CM

## 2019-09-16 NOTE — TELEPHONE ENCOUNTER
LS    Pt left message on voicemail. He's calling to discuss b/p checks in regards to recent medication changes. Pt can be reached at [phone].

## 2019-09-18 NOTE — TELEPHONE ENCOUNTER
Patient called, discussed BP since med changes at last OV    Up until 2 days ago -73 (for 12 straight days)  HR 53  Last 2 days 134/70 /67 PM  Yesterday 125/72 /67 AM    Patient reports no side effects, no concerns.     Patient inquiring if he can start taking his BP QD vs BID.  Advised to take it once daily, then every other day.  Report any sustained abnormal values.  Contact clinic if sustained >130/80    Patient verbalizes all understanding.

## 2019-11-12 ENCOUNTER — OFFICE VISIT (OUTPATIENT)
Dept: CARDIOLOGY | Facility: MEDICAL CENTER | Age: 74
End: 2019-11-12
Payer: MEDICARE

## 2019-11-12 VITALS
OXYGEN SATURATION: 92 % | SYSTOLIC BLOOD PRESSURE: 132 MMHG | DIASTOLIC BLOOD PRESSURE: 70 MMHG | HEART RATE: 56 BPM | BODY MASS INDEX: 26.9 KG/M2 | HEIGHT: 73 IN | WEIGHT: 203 LBS

## 2019-11-12 DIAGNOSIS — I10 ESSENTIAL HYPERTENSION: Chronic | ICD-10-CM

## 2019-11-12 DIAGNOSIS — E11.9 CONTROLLED TYPE 2 DIABETES MELLITUS WITHOUT COMPLICATION, WITHOUT LONG-TERM CURRENT USE OF INSULIN (HCC): Chronic | ICD-10-CM

## 2019-11-12 DIAGNOSIS — I25.10 CORONARY ARTERY DISEASE INVOLVING NATIVE CORONARY ARTERY OF NATIVE HEART WITHOUT ANGINA PECTORIS: ICD-10-CM

## 2019-11-12 DIAGNOSIS — E78.5 DYSLIPIDEMIA: Chronic | ICD-10-CM

## 2019-11-12 PROBLEM — R19.7 DIARRHEA: Status: RESOLVED | Noted: 2019-03-19 | Resolved: 2019-11-12

## 2019-11-12 PROCEDURE — 99214 OFFICE O/P EST MOD 30 MIN: CPT | Performed by: NURSE PRACTITIONER

## 2019-11-12 RX ORDER — AMLODIPINE BESYLATE 2.5 MG/1
7.5 TABLET ORAL
Qty: 270 TAB | Refills: 3
Start: 2019-11-12 | End: 2020-01-01

## 2019-11-12 ASSESSMENT — ENCOUNTER SYMPTOMS
COUGH: 0
ABDOMINAL PAIN: 0
PALPITATIONS: 0
LOSS OF CONSCIOUSNESS: 0
MYALGIAS: 0
HEADACHES: 0
ORTHOPNEA: 0
NAUSEA: 0
DIZZINESS: 0
PND: 0
FEVER: 0
BRUISES/BLEEDS EASILY: 0
SHORTNESS OF BREATH: 0
INSOMNIA: 0
CHILLS: 0

## 2019-11-12 NOTE — PROGRESS NOTES
"Chief Complaint   Patient presents with   • Follow-Up   • HTN (Controlled)   • Evaluation Of Medication Change       Subjective:   Vinod Conrad is a 74 y.o. male who presents today for 3 month follow-up of elevated BP and medication change evaluation.    Vinod is a 74 year old male with history of CAD, status post PCI/ESTEFANY x 2 to the LAD in 2018, hypertension and hyperlipidemia, normally followed by Dr. Jacobo.    At last follow-up, BP was up, and Amlodipine was increased from 2.5mg to 7.5mg daily, and Metoprolol was increased from 25mg BID to 37.5mg BID.    He is here today for follow-up. BP is much better, running 115-130 systolic at home, both in the AM and PM. He feels well: no chest pain, pressure or discomfort; no palpitations; no shortness of breath, orthopnea or PND; no dizziness or syncope; he does notice some very mild, stable LE edema.    Past Medical History:   Diagnosis Date   • Allergy    • Anesthesia     Hallucinations post anesthesia, at home   • Arthritis     all joints   • CAD (coronary artery disease) 08/15/2018    PCI/ESTEFANY x 2 to the proximal mid LAD (Xience 3.0 x 38mm, 3.0 x 8mm).   • CATARACT     bilateral IOL   • Hyperlipidemia    • Hypertension    • Infectious disease     C. Difficile   • Pain    • Unspecified hemorrhagic conditions     \"abd bleeding\"     Past Surgical History:   Procedure Laterality Date   • GASTROSCOPY N/A 2018    Procedure: GASTROSCOPY;  Surgeon: Amari Juárez M.D.;  Location: SURGERY [facility];  Service: Gastroenterology   • CERVICAL FORAMINOTOMY  2013    Performed by Krish Yang M.D. at SURGERY [facility]   • OTHER      torn retina RIGHT EYE   • SCLERAL BUCKLING  2009    Performed by YVETTE DRAPER at SURGERY SAME DAY [facility]   • OTHER ORTHOPEDIC SURGERY      l knee     Family History   Problem Relation Age of Onset   • Heart Failure Mother 78         at 78   • Heart Disease Brother 70        3 " stents     Social History     Socioeconomic History   • Marital status:      Spouse name: Not on file   • Number of children: Not on file   • Years of education: Not on file   • Highest education level: Not on file   Occupational History   • Not on file   Social Needs   • Financial resource strain: Not on file   • Food insecurity:     Worry: Not on file     Inability: Not on file   • Transportation needs:     Medical: Not on file     Non-medical: Not on file   Tobacco Use   • Smoking status: Former Smoker     Packs/day: 2.00     Years: 30.00     Pack years: 60.00     Types: Cigarettes     Last attempt to quit: 1998     Years since quittin.8   • Smokeless tobacco: Never Used   Substance and Sexual Activity   • Alcohol use: Yes     Alcohol/week: 0.0 oz     Comment: 5 per week   • Drug use: No   • Sexual activity: Not on file   Lifestyle   • Physical activity:     Days per week: Not on file     Minutes per session: Not on file   • Stress: Not on file   Relationships   • Social connections:     Talks on phone: Not on file     Gets together: Not on file     Attends Scientology service: Not on file     Active member of club or organization: Not on file     Attends meetings of clubs or organizations: Not on file     Relationship status: Not on file   • Intimate partner violence:     Fear of current or ex partner: Not on file     Emotionally abused: Not on file     Physically abused: Not on file     Forced sexual activity: Not on file   Other Topics Concern   • Not on file   Social History Narrative   • Not on file     Allergies   Allergen Reactions   • Aspirin      INTERNAL BLEEDING, 325 mg   • Augmentin Vomiting   • Clindamycin      Develop c.diff   • Metronidazole      Not sure   • Nsaids Unspecified     GI bleed   • Vancomycin Swelling   • Ace Inhibitors Cough     Outpatient Encounter Medications as of 2019   Medication Sig Dispense Refill   • amLODIPine (NORVASC) 2.5 MG Tab Take 3 Tabs by mouth  "every day. 270 Tab 3   • metoprolol (LOPRESSOR) 25 MG Tab Take 1.5 Tabs by mouth 2 times a day. 270 Tab 3   • losartan (COZAAR) 100 MG Tab Take 1 Tab by mouth every day. 90 Tab 3   • atorvastatin (LIPITOR) 80 MG tablet Take 1 Tab by mouth every bedtime. 90 Tab 3   • omeprazole (PRILOSEC) 20 MG delayed-release capsule Take 1 Cap by mouth every day. 90 Cap 3   • acetaminophen (TYLENOL) 325 MG Tab Take 650 mg by mouth every four hours as needed.     • nitroglycerin (NITROSTAT) 0.4 MG SL Tab Place 1 Tab under tongue as needed for Chest Pain. Up to 3 every five minutes 25 Tab 11   • aspirin EC 81 MG EC tablet Take 1 Tab by mouth every day. 30 Tab 3   • [DISCONTINUED] amLODIPine (NORVASC) 2.5 MG Tab TAKE 1 TABLET BY MOUTH EVERY DAY 90 Tab 3     No facility-administered encounter medications on file as of 11/12/2019.      Review of Systems   Constitutional: Negative for chills and fever.   HENT: Negative for congestion.    Respiratory: Negative for cough and shortness of breath.    Cardiovascular: Positive for leg swelling. Negative for chest pain, palpitations, orthopnea and PND.        Very mild, since increasing Amlodipine.   Gastrointestinal: Negative for abdominal pain and nausea.   Musculoskeletal: Negative for myalgias.   Skin: Negative for rash.   Neurological: Negative for dizziness, loss of consciousness and headaches.   Endo/Heme/Allergies: Does not bruise/bleed easily.   Psychiatric/Behavioral: The patient does not have insomnia.         Objective:   /70 (BP Location: Left arm, Patient Position: Sitting, BP Cuff Size: Adult)   Pulse (!) 56   Ht 1.854 m (6' 1\")   Wt 92.1 kg (203 lb)   SpO2 92%   BMI 26.78 kg/m²     Physical Exam   Constitutional: He is oriented to person, place, and time. He appears well-developed and well-nourished.   HENT:   Head: Normocephalic.   Eyes: EOM are normal.   Neck: Normal range of motion. Neck supple. No JVD present.   Cardiovascular: Normal rate, regular rhythm and " normal heart sounds.   Pulmonary/Chest: Effort normal and breath sounds normal. No respiratory distress. He has no wheezes. He has no rales.   Abdominal: Soft. Bowel sounds are normal. He exhibits no distension. There is no tenderness.   Musculoskeletal: Normal range of motion.         General: Edema present.      Comments: Trace edema to the ankles bilaterally.   Neurological: He is alert and oriented to person, place, and time.   Skin: Skin is warm and dry. No rash noted.   Psychiatric: He has a normal mood and affect.     Lab Results   Component Value Date/Time    CHOLSTRLTOT 124 07/11/2019 07:45 AM    LDL 53 07/11/2019 07:45 AM    HDL 49 07/11/2019 07:45 AM    TRIGLYCERIDE 108 07/11/2019 07:45 AM       Lab Results   Component Value Date/Time    SODIUM 141 07/11/2019 07:44 AM    POTASSIUM 4.0 07/11/2019 07:44 AM    CHLORIDE 104 07/11/2019 07:44 AM    CO2 27 07/11/2019 07:44 AM    GLUCOSE 125 (H) 07/11/2019 07:44 AM    BUN 19 07/11/2019 07:44 AM    CREATININE 0.78 07/11/2019 07:44 AM    CREATININE 1.0 01/12/2009 08:55 AM     Lab Results   Component Value Date/Time    ALKPHOSPHAT 74 01/24/2019 07:56 AM    ASTSGOT 21 01/24/2019 07:56 AM    ALTSGPT 22 01/24/2019 07:56 AM    TBILIRUBIN 0.9 01/24/2019 07:56 AM        CONCLUSIONS OF ECHOCARDIOGRAM OF 8/17/2018:  Mildly reduced left ventricular systolic function.  Akinesis of the distal septum and apex (LAD territory).  Aortic sclerosis without stenosis.  Estimated right ventricular systolic pressure is 33 mmHg + JVP.  No prior study is available for comparison.    PROCEDURE RESULTS OF 8/15/2018:  Cardiac catheterization and percutaneous coronary intervention.  A.  Left heart catheterization.  B.  Left ventriculography.  C.  Selective coronary angiography.  D.  Coronary stent implantation, proximal mid left anterior descending artery   with overlapping drug-eluting Xience stent 3.0x38 mm and 3.0x8 mm postdilated   to 3.6 mm.  E.  Right radial artery  approach.        Assessment:     1. Essential hypertension  amLODIPine (NORVASC) 2.5 MG Tab   2. Coronary artery disease involving native coronary artery of native heart without angina pectoris     3. Dyslipidemia     4. Controlled type 2 diabetes mellitus without complication, without long-term current use of insulin ([facility])         Medical Decision Making:  Today's Assessment / Status / Plan:     1. Hypertension, treated and much improved on new regiment. Continue same meds and doses.    2. CAD, status post PCI/ESTEFANY x 2 to the LAD, stable. He has not had any angina or shortness of breath. He exercise daily and tolerates well.    3. Hyperlipidemia, treated with statin (Lipitor). Recent LDL was 53.    4. Diabetes mellitus, treated with diet only.    Same medications for now. Monitor BP 2x/weekly. FU with Dr. Jacobo in 3 months, sooner if clinical condition changes.    Collaborating MD: Kvng

## 2019-11-13 ENCOUNTER — OFFICE VISIT (OUTPATIENT)
Dept: MEDICAL GROUP | Facility: MEDICAL CENTER | Age: 74
End: 2019-11-13
Payer: MEDICARE

## 2019-11-13 VITALS
HEIGHT: 73 IN | OXYGEN SATURATION: 94 % | TEMPERATURE: 98.1 F | DIASTOLIC BLOOD PRESSURE: 72 MMHG | SYSTOLIC BLOOD PRESSURE: 132 MMHG | HEART RATE: 53 BPM | WEIGHT: 201 LBS | BODY MASS INDEX: 26.64 KG/M2

## 2019-11-13 DIAGNOSIS — N52.01 ERECTILE DYSFUNCTION DUE TO ARTERIAL INSUFFICIENCY: ICD-10-CM

## 2019-11-13 DIAGNOSIS — R09.81 NASAL CONGESTION: ICD-10-CM

## 2019-11-13 DIAGNOSIS — J34.89 RHINORRHEA: ICD-10-CM

## 2019-11-13 PROCEDURE — 99214 OFFICE O/P EST MOD 30 MIN: CPT | Performed by: FAMILY MEDICINE

## 2019-11-13 RX ORDER — TADALAFIL 20 MG/1
20 TABLET ORAL
Qty: 20 TAB | Refills: 5 | Status: SHIPPED | OUTPATIENT
Start: 2019-11-13 | End: 2020-01-01 | Stop reason: SDUPTHER

## 2019-11-13 RX ORDER — IPRATROPIUM BROMIDE 42 UG/1
2 SPRAY, METERED NASAL 4 TIMES DAILY
Qty: 1 BOTTLE | Refills: 5 | Status: SHIPPED | OUTPATIENT
Start: 2019-11-13 | End: 2020-01-01

## 2019-11-13 NOTE — PROGRESS NOTES
"Subjective:   Vinod Conrad is a 74 y.o. male here today for sinus congestion    Has been having sinus congestion and rhinorrhea off and on for the last 1 month. Some nights he is totally stuffed up. He was treated with Atrovent 0.06%, which worked well in the past, requesting a refill. Mucus is clear and sometime blood tinged in the morning. No known allergies.    He would like to try Cialis for his ED. Has not used nitroglycerine for many months.      Current medicines (including changes today)  Current Outpatient Medications   Medication Sig Dispense Refill   • amLODIPine (NORVASC) 2.5 MG Tab Take 3 Tabs by mouth every day. 270 Tab 3   • metoprolol (LOPRESSOR) 25 MG Tab Take 1.5 Tabs by mouth 2 times a day. 270 Tab 3   • losartan (COZAAR) 100 MG Tab Take 1 Tab by mouth every day. 90 Tab 3   • atorvastatin (LIPITOR) 80 MG tablet Take 1 Tab by mouth every bedtime. 90 Tab 3   • omeprazole (PRILOSEC) 20 MG delayed-release capsule Take 1 Cap by mouth every day. 90 Cap 3   • acetaminophen (TYLENOL) 325 MG Tab Take 650 mg by mouth every four hours as needed.     • nitroglycerin (NITROSTAT) 0.4 MG SL Tab Place 1 Tab under tongue as needed for Chest Pain. Up to 3 every five minutes 25 Tab 11   • aspirin EC 81 MG EC tablet Take 1 Tab by mouth every day. 30 Tab 3     No current facility-administered medications for this visit.      He  has a past medical history of Allergy, Anesthesia (2006), Arthritis, CAD (coronary artery disease) (08/15/2018), CATARACT, Hyperlipidemia, Hypertension, Infectious disease (2011), Pain, and Unspecified hemorrhagic conditions (2006). He also has no past medical history of ASTHMA.    ROS   No fever, + bilateral sinus pressure       Objective:     /72 (BP Location: Right arm, Patient Position: Sitting)   Pulse (!) 53   Temp 36.7 °C (98.1 °F)   Ht 1.854 m (6' 1\")   Wt 91.2 kg (201 lb)   SpO2 94%  Body mass index is 26.52 kg/m².   Physical Exam:  Constitutional: Alert, no " distress.  Skin: Warm, dry, good turgor, no rashes in visible areas.  Eye: Equal, round and reactive, conjunctiva clear, lids normal.  ENMT: Lips without lesions, good dentition, oropharynx clear. + nasal congestion and clear rhinorrhea. Mild bilateral sinus maxillary pressure on palpation.  Neck: Trachea midline, no masses, no thyromegaly. No cervical or supraclavicular lymphadenopathy  Respiratory: Unlabored respiratory effort, lungs clear to auscultation, no wheezes, no ronchi.  Cardiovascular: Normal S1, S2, no murmur, no edema.  Psych: Alert and oriented x3, normal affect and mood.        Assessment and Plan:   The following treatment plan was discussed    1. Nasal congestion  Possibly allergies. Uncontrolled. Refill Atrovent nasal. If no improvement in 5 days consider Z-ana.  - ipratropium (ATROVENT) 0.06 % Solution; Spray 2 Sprays in nose 4 times a day.  Dispense: 1 Bottle; Refill: 5    2. Rhinorrhea  Possibly allergies. Uncontrolled. Refill Atrovent nasal.  - ipratropium (ATROVENT) 0.06 % Solution; Spray 2 Sprays in nose 4 times a day.  Dispense: 1 Bottle; Refill: 5    3. Erectile dysfunction due to arterial insufficiency  Prescription for Cialis.  - tadalafil (CIALIS) 20 MG tablet; Take 1 Tab by mouth 1 time daily as needed for Erectile Dysfunction.  Dispense: 20 Tab; Refill: 5      Followup: Return if symptoms worsen or fail to improve.

## 2019-11-14 ENCOUNTER — TELEPHONE (OUTPATIENT)
Dept: CARDIOLOGY | Facility: MEDICAL CENTER | Age: 74
End: 2019-11-14

## 2019-11-14 NOTE — TELEPHONE ENCOUNTER
AB    Pt has a personal question for AB did not want to specify further. Please call pt for details at [phone] or [phone].

## 2019-11-18 ENCOUNTER — TELEPHONE (OUTPATIENT)
Dept: MEDICAL GROUP | Facility: MEDICAL CENTER | Age: 74
End: 2019-11-18

## 2019-11-18 RX ORDER — AZITHROMYCIN 250 MG/1
TABLET, FILM COATED ORAL
Qty: 6 TAB | Refills: 0 | Status: SHIPPED | OUTPATIENT
Start: 2019-11-18 | End: 2019-11-23

## 2019-11-18 NOTE — TELEPHONE ENCOUNTER
Patient has been on nasal spray for 5 days and still has no relief. He is requesting antibiotics as the next step. Please send rx to [facility] on [address].

## 2019-12-02 DIAGNOSIS — E78.5 DYSLIPIDEMIA: ICD-10-CM

## 2019-12-02 RX ORDER — ATORVASTATIN CALCIUM 80 MG/1
TABLET, FILM COATED ORAL
Qty: 90 TAB | Refills: 3 | Status: SHIPPED | OUTPATIENT
Start: 2019-12-02 | End: 2020-01-01

## 2020-01-01 ENCOUNTER — APPOINTMENT (OUTPATIENT)
Dept: RADIOLOGY | Facility: MEDICAL CENTER | Age: 75
End: 2020-01-01
Attending: HOSPITALIST
Payer: MEDICARE

## 2020-01-01 ENCOUNTER — OFFICE VISIT (OUTPATIENT)
Dept: CARDIOLOGY | Facility: MEDICAL CENTER | Age: 75
End: 2020-01-01
Payer: MEDICARE

## 2020-01-01 ENCOUNTER — APPOINTMENT (OUTPATIENT)
Dept: MEDICAL GROUP | Facility: MEDICAL CENTER | Age: 75
End: 2020-01-01
Payer: MEDICARE

## 2020-01-01 ENCOUNTER — TELEPHONE (OUTPATIENT)
Dept: CARDIOLOGY | Facility: MEDICAL CENTER | Age: 75
End: 2020-01-01

## 2020-01-01 ENCOUNTER — HOSPITAL ENCOUNTER (OUTPATIENT)
Facility: MEDICAL CENTER | Age: 75
End: 2020-07-08
Attending: EMERGENCY MEDICINE | Admitting: HOSPITALIST
Payer: MEDICARE

## 2020-01-01 ENCOUNTER — NON-PROVIDER VISIT (OUTPATIENT)
Dept: MEDICAL GROUP | Facility: MEDICAL CENTER | Age: 75
End: 2020-01-01
Payer: MEDICARE

## 2020-01-01 ENCOUNTER — OFFICE VISIT (OUTPATIENT)
Dept: MEDICAL GROUP | Facility: MEDICAL CENTER | Age: 75
End: 2020-01-01
Payer: MEDICARE

## 2020-01-01 ENCOUNTER — TELEPHONE (OUTPATIENT)
Dept: MEDICAL GROUP | Facility: MEDICAL CENTER | Age: 75
End: 2020-01-01

## 2020-01-01 ENCOUNTER — HOSPITAL ENCOUNTER (OUTPATIENT)
Dept: CARDIOLOGY | Facility: MEDICAL CENTER | Age: 75
End: 2020-08-17
Attending: FAMILY MEDICINE
Payer: MEDICARE

## 2020-01-01 ENCOUNTER — HOSPITAL ENCOUNTER (OUTPATIENT)
Dept: RADIOLOGY | Facility: MEDICAL CENTER | Age: 75
End: 2020-07-17
Attending: HOSPITALIST
Payer: MEDICARE

## 2020-01-01 ENCOUNTER — HOSPITAL ENCOUNTER (OUTPATIENT)
Dept: LAB | Facility: MEDICAL CENTER | Age: 75
End: 2020-08-14
Attending: FAMILY MEDICINE
Payer: MEDICARE

## 2020-01-01 ENCOUNTER — APPOINTMENT (OUTPATIENT)
Dept: RADIOLOGY | Facility: MEDICAL CENTER | Age: 75
End: 2020-01-01
Attending: EMERGENCY MEDICINE
Payer: MEDICARE

## 2020-01-01 VITALS
RESPIRATION RATE: 18 BRPM | TEMPERATURE: 97.7 F | DIASTOLIC BLOOD PRESSURE: 76 MMHG | HEART RATE: 57 BPM | HEIGHT: 73 IN | WEIGHT: 202.82 LBS | OXYGEN SATURATION: 94 % | BODY MASS INDEX: 26.88 KG/M2 | SYSTOLIC BLOOD PRESSURE: 149 MMHG

## 2020-01-01 VITALS
SYSTOLIC BLOOD PRESSURE: 148 MMHG | BODY MASS INDEX: 26.51 KG/M2 | HEART RATE: 62 BPM | DIASTOLIC BLOOD PRESSURE: 68 MMHG | HEIGHT: 73 IN | OXYGEN SATURATION: 96 % | WEIGHT: 200 LBS

## 2020-01-01 VITALS
WEIGHT: 201 LBS | DIASTOLIC BLOOD PRESSURE: 70 MMHG | HEIGHT: 73 IN | BODY MASS INDEX: 26.64 KG/M2 | OXYGEN SATURATION: 94 % | SYSTOLIC BLOOD PRESSURE: 140 MMHG | TEMPERATURE: 97.7 F | HEART RATE: 56 BPM

## 2020-01-01 VITALS
WEIGHT: 199.4 LBS | SYSTOLIC BLOOD PRESSURE: 144 MMHG | TEMPERATURE: 98.1 F | HEART RATE: 78 BPM | RESPIRATION RATE: 20 BRPM | DIASTOLIC BLOOD PRESSURE: 80 MMHG | OXYGEN SATURATION: 93 % | BODY MASS INDEX: 26.31 KG/M2

## 2020-01-01 VITALS
SYSTOLIC BLOOD PRESSURE: 120 MMHG | DIASTOLIC BLOOD PRESSURE: 70 MMHG | BODY MASS INDEX: 26.24 KG/M2 | WEIGHT: 198 LBS | HEART RATE: 68 BPM | HEIGHT: 73 IN | TEMPERATURE: 96.4 F | OXYGEN SATURATION: 95 %

## 2020-01-01 DIAGNOSIS — R07.9 CHEST PAIN, UNSPECIFIED TYPE: ICD-10-CM

## 2020-01-01 DIAGNOSIS — E11.9 CONTROLLED TYPE 2 DIABETES MELLITUS WITHOUT COMPLICATION, WITHOUT LONG-TERM CURRENT USE OF INSULIN (HCC): Chronic | ICD-10-CM

## 2020-01-01 DIAGNOSIS — Z23 NEED FOR VACCINATION: ICD-10-CM

## 2020-01-01 DIAGNOSIS — N52.01 ERECTILE DYSFUNCTION DUE TO ARTERIAL INSUFFICIENCY: ICD-10-CM

## 2020-01-01 DIAGNOSIS — I25.10 CORONARY ARTERY DISEASE INVOLVING NATIVE CORONARY ARTERY OF NATIVE HEART WITHOUT ANGINA PECTORIS: ICD-10-CM

## 2020-01-01 DIAGNOSIS — J34.89 RHINORRHEA: ICD-10-CM

## 2020-01-01 DIAGNOSIS — R21 RASH OF BODY: ICD-10-CM

## 2020-01-01 DIAGNOSIS — I10 ESSENTIAL HYPERTENSION: Chronic | ICD-10-CM

## 2020-01-01 DIAGNOSIS — E78.5 DYSLIPIDEMIA: Chronic | ICD-10-CM

## 2020-01-01 DIAGNOSIS — Z12.5 PROSTATE CANCER SCREENING: ICD-10-CM

## 2020-01-01 DIAGNOSIS — R07.9 ACUTE CHEST PAIN: ICD-10-CM

## 2020-01-01 DIAGNOSIS — R21 SKIN RASH: ICD-10-CM

## 2020-01-01 DIAGNOSIS — I10 ESSENTIAL HYPERTENSION: ICD-10-CM

## 2020-01-01 DIAGNOSIS — R73.9 HYPERGLYCEMIA: ICD-10-CM

## 2020-01-01 DIAGNOSIS — R09.81 NASAL CONGESTION: ICD-10-CM

## 2020-01-01 LAB
ALBUMIN SERPL BCP-MCNC: 3.7 G/DL (ref 3.2–4.9)
ALBUMIN SERPL BCP-MCNC: 4.5 G/DL (ref 3.2–4.9)
ALBUMIN SERPL BCP-MCNC: 4.7 G/DL (ref 3.2–4.9)
ALBUMIN/GLOB SERPL: 1.6 G/DL
ALBUMIN/GLOB SERPL: 1.7 G/DL
ALBUMIN/GLOB SERPL: 1.9 G/DL
ALP SERPL-CCNC: 65 U/L (ref 30–99)
ALP SERPL-CCNC: 67 U/L (ref 30–99)
ALP SERPL-CCNC: 80 U/L (ref 30–99)
ALT SERPL-CCNC: 17 U/L (ref 2–50)
ALT SERPL-CCNC: 22 U/L (ref 2–50)
ALT SERPL-CCNC: 32 U/L (ref 2–50)
ANION GAP SERPL CALC-SCNC: 12 MMOL/L (ref 7–16)
ANION GAP SERPL CALC-SCNC: 12 MMOL/L (ref 7–16)
ANION GAP SERPL CALC-SCNC: 13 MMOL/L (ref 7–16)
AST SERPL-CCNC: 18 U/L (ref 12–45)
AST SERPL-CCNC: 27 U/L (ref 12–45)
AST SERPL-CCNC: 33 U/L (ref 12–45)
BASOPHILS # BLD AUTO: 0.6 % (ref 0–1.8)
BASOPHILS # BLD AUTO: 0.7 % (ref 0–1.8)
BASOPHILS # BLD AUTO: 0.7 % (ref 0–1.8)
BASOPHILS # BLD: 0.05 K/UL (ref 0–0.12)
BILIRUB SERPL-MCNC: 0.6 MG/DL (ref 0.1–1.5)
BILIRUB SERPL-MCNC: 0.8 MG/DL (ref 0.1–1.5)
BILIRUB SERPL-MCNC: 0.9 MG/DL (ref 0.1–1.5)
BUN SERPL-MCNC: 12 MG/DL (ref 8–22)
BUN SERPL-MCNC: 13 MG/DL (ref 8–22)
BUN SERPL-MCNC: 17 MG/DL (ref 8–22)
CALCIUM SERPL-MCNC: 9.3 MG/DL (ref 8.4–10.2)
CALCIUM SERPL-MCNC: 9.4 MG/DL (ref 8.4–10.2)
CALCIUM SERPL-MCNC: 9.7 MG/DL (ref 8.5–10.5)
CHLORIDE SERPL-SCNC: 101 MMOL/L (ref 96–112)
CHLORIDE SERPL-SCNC: 102 MMOL/L (ref 96–112)
CHLORIDE SERPL-SCNC: 104 MMOL/L (ref 96–112)
CHOLEST SERPL-MCNC: 131 MG/DL (ref 100–199)
CO2 SERPL-SCNC: 23 MMOL/L (ref 20–33)
CO2 SERPL-SCNC: 25 MMOL/L (ref 20–33)
CO2 SERPL-SCNC: 25 MMOL/L (ref 20–33)
COVID ORDER STATUS COVID19: NORMAL
CREAT SERPL-MCNC: 0.49 MG/DL (ref 0.5–1.4)
CREAT SERPL-MCNC: 0.7 MG/DL (ref 0.5–1.4)
CREAT SERPL-MCNC: 0.72 MG/DL (ref 0.5–1.4)
CREAT UR-MCNC: 147.99 MG/DL
EKG IMPRESSION: NORMAL
EKG IMPRESSION: NORMAL
EOSINOPHIL # BLD AUTO: 0.09 K/UL (ref 0–0.51)
EOSINOPHIL # BLD AUTO: 0.14 K/UL (ref 0–0.51)
EOSINOPHIL # BLD AUTO: 0.26 K/UL (ref 0–0.51)
EOSINOPHIL NFR BLD: 1.2 % (ref 0–6.9)
EOSINOPHIL NFR BLD: 1.9 % (ref 0–6.9)
EOSINOPHIL NFR BLD: 3.2 % (ref 0–6.9)
ERYTHROCYTE [DISTWIDTH] IN BLOOD BY AUTOMATED COUNT: 46.1 FL (ref 35.9–50)
ERYTHROCYTE [DISTWIDTH] IN BLOOD BY AUTOMATED COUNT: 46.2 FL (ref 35.9–50)
ERYTHROCYTE [DISTWIDTH] IN BLOOD BY AUTOMATED COUNT: 46.5 FL (ref 35.9–50)
EST. AVERAGE GLUCOSE BLD GHB EST-MCNC: 140 MG/DL
FASTING STATUS PATIENT QL REPORTED: NORMAL
GLOBULIN SER CALC-MCNC: 2.3 G/DL (ref 1.9–3.5)
GLOBULIN SER CALC-MCNC: 2.5 G/DL (ref 1.9–3.5)
GLOBULIN SER CALC-MCNC: 2.6 G/DL (ref 1.9–3.5)
GLUCOSE SERPL-MCNC: 124 MG/DL (ref 65–99)
GLUCOSE SERPL-MCNC: 133 MG/DL (ref 65–99)
GLUCOSE SERPL-MCNC: 241 MG/DL (ref 65–99)
HBA1C MFR BLD: 6.5 % (ref 0–5.6)
HCT VFR BLD AUTO: 42.6 % (ref 42–52)
HCT VFR BLD AUTO: 46.7 % (ref 42–52)
HCT VFR BLD AUTO: 47.7 % (ref 42–52)
HDLC SERPL-MCNC: 61 MG/DL
HGB BLD-MCNC: 14 G/DL (ref 14–18)
HGB BLD-MCNC: 15.2 G/DL (ref 14–18)
HGB BLD-MCNC: 15.6 G/DL (ref 14–18)
IMM GRANULOCYTES # BLD AUTO: 0.01 K/UL (ref 0–0.11)
IMM GRANULOCYTES # BLD AUTO: 0.01 K/UL (ref 0–0.11)
IMM GRANULOCYTES # BLD AUTO: 0.02 K/UL (ref 0–0.11)
IMM GRANULOCYTES NFR BLD AUTO: 0.1 % (ref 0–0.9)
IMM GRANULOCYTES NFR BLD AUTO: 0.1 % (ref 0–0.9)
IMM GRANULOCYTES NFR BLD AUTO: 0.3 % (ref 0–0.9)
LDLC SERPL CALC-MCNC: 54 MG/DL
LV EJECT FRACT  99904: 65
LV EJECT FRACT MOD 2C 99903: 44.18
LV EJECT FRACT MOD 4C 99902: 63.09
LV EJECT FRACT MOD BP 99901: 55.17
LYMPHOCYTES # BLD AUTO: 1.69 K/UL (ref 1–4.8)
LYMPHOCYTES # BLD AUTO: 2.04 K/UL (ref 1–4.8)
LYMPHOCYTES # BLD AUTO: 2.46 K/UL (ref 1–4.8)
LYMPHOCYTES NFR BLD: 22.5 % (ref 22–41)
LYMPHOCYTES NFR BLD: 28.3 % (ref 22–41)
LYMPHOCYTES NFR BLD: 30.4 % (ref 22–41)
MAGNESIUM SERPL-MCNC: 1.7 MG/DL (ref 1.5–2.5)
MCH RBC QN AUTO: 31.2 PG (ref 27–33)
MCH RBC QN AUTO: 31.3 PG (ref 27–33)
MCH RBC QN AUTO: 31.9 PG (ref 27–33)
MCHC RBC AUTO-ENTMCNC: 32.5 G/DL (ref 33.7–35.3)
MCHC RBC AUTO-ENTMCNC: 32.7 G/DL (ref 33.7–35.3)
MCHC RBC AUTO-ENTMCNC: 32.9 G/DL (ref 33.7–35.3)
MCV RBC AUTO: 95.3 FL (ref 81.4–97.8)
MCV RBC AUTO: 95.9 FL (ref 81.4–97.8)
MCV RBC AUTO: 97.5 FL (ref 81.4–97.8)
MICROALBUMIN UR-MCNC: 2.9 MG/DL
MICROALBUMIN/CREAT UR: 20 MG/G (ref 0–30)
MONOCYTES # BLD AUTO: 0.54 K/UL (ref 0–0.85)
MONOCYTES # BLD AUTO: 0.63 K/UL (ref 0–0.85)
MONOCYTES # BLD AUTO: 0.84 K/UL (ref 0–0.85)
MONOCYTES NFR BLD AUTO: 10.4 % (ref 0–13.4)
MONOCYTES NFR BLD AUTO: 7.2 % (ref 0–13.4)
MONOCYTES NFR BLD AUTO: 8.7 % (ref 0–13.4)
NEUTROPHILS # BLD AUTO: 4.35 K/UL (ref 1.82–7.42)
NEUTROPHILS # BLD AUTO: 4.48 K/UL (ref 1.82–7.42)
NEUTROPHILS # BLD AUTO: 5.11 K/UL (ref 1.82–7.42)
NEUTROPHILS NFR BLD: 55.3 % (ref 44–72)
NEUTROPHILS NFR BLD: 60.3 % (ref 44–72)
NEUTROPHILS NFR BLD: 68.1 % (ref 44–72)
NRBC # BLD AUTO: 0 K/UL
NRBC BLD-RTO: 0 /100 WBC
PLATELET # BLD AUTO: 167 K/UL (ref 164–446)
PLATELET # BLD AUTO: 179 K/UL (ref 164–446)
PLATELET # BLD AUTO: 189 K/UL (ref 164–446)
PMV BLD AUTO: 10.2 FL (ref 9–12.9)
PMV BLD AUTO: 10.8 FL (ref 9–12.9)
PMV BLD AUTO: 10.9 FL (ref 9–12.9)
POTASSIUM SERPL-SCNC: 3.4 MMOL/L (ref 3.6–5.5)
POTASSIUM SERPL-SCNC: 3.9 MMOL/L (ref 3.6–5.5)
POTASSIUM SERPL-SCNC: 4.2 MMOL/L (ref 3.6–5.5)
PROT SERPL-MCNC: 6 G/DL (ref 6–8.2)
PROT SERPL-MCNC: 7.1 G/DL (ref 6–8.2)
PROT SERPL-MCNC: 7.2 G/DL (ref 6–8.2)
PSA SERPL-MCNC: 3.01 NG/ML (ref 0–4)
RBC # BLD AUTO: 4.47 M/UL (ref 4.7–6.1)
RBC # BLD AUTO: 4.87 M/UL (ref 4.7–6.1)
RBC # BLD AUTO: 4.89 M/UL (ref 4.7–6.1)
SARS-COV-2 RNA RESP QL NAA+PROBE: NOTDETECTED
SODIUM SERPL-SCNC: 139 MMOL/L (ref 135–145)
SPECIMEN SOURCE: NORMAL
TRIGL SERPL-MCNC: 80 MG/DL (ref 0–149)
TROPONIN T SERPL-MCNC: 11 NG/L (ref 6–19)
TROPONIN T SERPL-MCNC: 9 NG/L (ref 6–19)
TSH SERPL DL<=0.005 MIU/L-ACNC: 2 UIU/ML (ref 0.38–5.33)
WBC # BLD AUTO: 7.2 K/UL (ref 4.8–10.8)
WBC # BLD AUTO: 7.5 K/UL (ref 4.8–10.8)
WBC # BLD AUTO: 8.1 K/UL (ref 4.8–10.8)

## 2020-01-01 PROCEDURE — 93306 TTE W/DOPPLER COMPLETE: CPT | Mod: 26 | Performed by: INTERNAL MEDICINE

## 2020-01-01 PROCEDURE — 93005 ELECTROCARDIOGRAM TRACING: CPT | Performed by: HOSPITALIST

## 2020-01-01 PROCEDURE — 99220 PR INITIAL OBSERVATION CARE,LEVL III: CPT | Performed by: HOSPITALIST

## 2020-01-01 PROCEDURE — 82570 ASSAY OF URINE CREATININE: CPT

## 2020-01-01 PROCEDURE — 83036 HEMOGLOBIN GLYCOSYLATED A1C: CPT | Mod: GA

## 2020-01-01 PROCEDURE — 90662 IIV NO PRSV INCREASED AG IM: CPT | Performed by: FAMILY MEDICINE

## 2020-01-01 PROCEDURE — 80061 LIPID PANEL: CPT

## 2020-01-01 PROCEDURE — 85025 COMPLETE CBC W/AUTO DIFF WBC: CPT

## 2020-01-01 PROCEDURE — 93306 TTE W/DOPPLER COMPLETE: CPT

## 2020-01-01 PROCEDURE — 99213 OFFICE O/P EST LOW 20 MIN: CPT | Performed by: INTERNAL MEDICINE

## 2020-01-01 PROCEDURE — A9502 TC99M TETROFOSMIN: HCPCS

## 2020-01-01 PROCEDURE — 36415 COLL VENOUS BLD VENIPUNCTURE: CPT | Mod: GA

## 2020-01-01 PROCEDURE — 99214 OFFICE O/P EST MOD 30 MIN: CPT | Performed by: NURSE PRACTITIONER

## 2020-01-01 PROCEDURE — A9270 NON-COVERED ITEM OR SERVICE: HCPCS | Performed by: HOSPITALIST

## 2020-01-01 PROCEDURE — 80053 COMPREHEN METABOLIC PANEL: CPT

## 2020-01-01 PROCEDURE — 700111 HCHG RX REV CODE 636 W/ 250 OVERRIDE (IP): Performed by: HOSPITALIST

## 2020-01-01 PROCEDURE — G0378 HOSPITAL OBSERVATION PER HR: HCPCS

## 2020-01-01 PROCEDURE — 96375 TX/PRO/DX INJ NEW DRUG ADDON: CPT

## 2020-01-01 PROCEDURE — 36415 COLL VENOUS BLD VENIPUNCTURE: CPT

## 2020-01-01 PROCEDURE — 93010 ELECTROCARDIOGRAM REPORT: CPT | Performed by: INTERNAL MEDICINE

## 2020-01-01 PROCEDURE — 84153 ASSAY OF PSA TOTAL: CPT | Mod: GA

## 2020-01-01 PROCEDURE — 71045 X-RAY EXAM CHEST 1 VIEW: CPT

## 2020-01-01 PROCEDURE — C9803 HOPD COVID-19 SPEC COLLECT: HCPCS | Performed by: EMERGENCY MEDICINE

## 2020-01-01 PROCEDURE — 84443 ASSAY THYROID STIM HORMONE: CPT

## 2020-01-01 PROCEDURE — 96365 THER/PROPH/DIAG IV INF INIT: CPT

## 2020-01-01 PROCEDURE — 93005 ELECTROCARDIOGRAM TRACING: CPT

## 2020-01-01 PROCEDURE — 99214 OFFICE O/P EST MOD 30 MIN: CPT | Performed by: FAMILY MEDICINE

## 2020-01-01 PROCEDURE — 700102 HCHG RX REV CODE 250 W/ 637 OVERRIDE(OP): Performed by: HOSPITALIST

## 2020-01-01 PROCEDURE — U0003 INFECTIOUS AGENT DETECTION BY NUCLEIC ACID (DNA OR RNA); SEVERE ACUTE RESPIRATORY SYNDROME CORONAVIRUS 2 (SARS-COV-2) (CORONAVIRUS DISEASE [COVID-19]), AMPLIFIED PROBE TECHNIQUE, MAKING USE OF HIGH THROUGHPUT TECHNOLOGIES AS DESCRIBED BY CMS-2020-01-R: HCPCS

## 2020-01-01 PROCEDURE — G0008 ADMIN INFLUENZA VIRUS VAC: HCPCS | Performed by: FAMILY MEDICINE

## 2020-01-01 PROCEDURE — 83735 ASSAY OF MAGNESIUM: CPT

## 2020-01-01 PROCEDURE — 700111 HCHG RX REV CODE 636 W/ 250 OVERRIDE (IP)

## 2020-01-01 PROCEDURE — 84484 ASSAY OF TROPONIN QUANT: CPT

## 2020-01-01 PROCEDURE — 93018 CV STRESS TEST I&R ONLY: CPT | Performed by: INTERNAL MEDICINE

## 2020-01-01 PROCEDURE — 99285 EMERGENCY DEPT VISIT HI MDM: CPT

## 2020-01-01 PROCEDURE — 82043 UR ALBUMIN QUANTITATIVE: CPT

## 2020-01-01 PROCEDURE — 93005 ELECTROCARDIOGRAM TRACING: CPT | Performed by: EMERGENCY MEDICINE

## 2020-01-01 PROCEDURE — 99217 PR OBSERVATION CARE DISCHARGE: CPT | Performed by: HOSPITALIST

## 2020-01-01 PROCEDURE — 78452 HT MUSCLE IMAGE SPECT MULT: CPT | Mod: 26 | Performed by: INTERNAL MEDICINE

## 2020-01-01 RX ORDER — ACETAMINOPHEN 325 MG/1
650 TABLET ORAL EVERY 6 HOURS PRN
Status: DISCONTINUED | OUTPATIENT
Start: 2020-01-01 | End: 2020-01-01 | Stop reason: HOSPADM

## 2020-01-01 RX ORDER — ATORVASTATIN CALCIUM 40 MG/1
80 TABLET, FILM COATED ORAL NIGHTLY
Status: DISCONTINUED | OUTPATIENT
Start: 2020-01-01 | End: 2020-01-01 | Stop reason: HOSPADM

## 2020-01-01 RX ORDER — LOSARTAN POTASSIUM 100 MG/1
100 TABLET ORAL EVERY EVENING
Qty: 90 TAB | Refills: 3 | Status: SHIPPED | OUTPATIENT
Start: 2020-01-01

## 2020-01-01 RX ORDER — IPRATROPIUM BROMIDE 21 UG/1
1 SPRAY, METERED NASAL 2 TIMES DAILY PRN
Status: DISCONTINUED | OUTPATIENT
Start: 2020-01-01 | End: 2020-01-01 | Stop reason: HOSPADM

## 2020-01-01 RX ORDER — TADALAFIL 20 MG/1
20 TABLET ORAL
Qty: 20 TAB | Refills: 5 | Status: SHIPPED | OUTPATIENT
Start: 2020-01-01

## 2020-01-01 RX ORDER — ATORVASTATIN CALCIUM 80 MG/1
80 TABLET, FILM COATED ORAL EVERY EVENING
Qty: 90 TAB | Refills: 3 | Status: SHIPPED | OUTPATIENT
Start: 2020-01-01

## 2020-01-01 RX ORDER — HYDRALAZINE HYDROCHLORIDE 20 MG/ML
20 INJECTION INTRAMUSCULAR; INTRAVENOUS EVERY 6 HOURS PRN
Status: DISCONTINUED | OUTPATIENT
Start: 2020-01-01 | End: 2020-01-01 | Stop reason: HOSPADM

## 2020-01-01 RX ORDER — OMEPRAZOLE 20 MG/1
20 CAPSULE, DELAYED RELEASE ORAL DAILY
Status: DISCONTINUED | OUTPATIENT
Start: 2020-01-01 | End: 2020-01-01 | Stop reason: HOSPADM

## 2020-01-01 RX ORDER — LOSARTAN POTASSIUM 100 MG/1
100 TABLET ORAL EVERY EVENING
COMMUNITY
End: 2020-01-01 | Stop reason: SDUPTHER

## 2020-01-01 RX ORDER — ASPIRIN 600 MG/1
300 SUPPOSITORY RECTAL DAILY
Status: DISCONTINUED | OUTPATIENT
Start: 2020-01-01 | End: 2020-01-01

## 2020-01-01 RX ORDER — OMEPRAZOLE 20 MG/1
20 CAPSULE, DELAYED RELEASE ORAL DAILY
COMMUNITY
End: 2020-01-01 | Stop reason: SDUPTHER

## 2020-01-01 RX ORDER — ASPIRIN 325 MG
325 TABLET ORAL DAILY
Status: DISCONTINUED | OUTPATIENT
Start: 2020-01-01 | End: 2020-01-01

## 2020-01-01 RX ORDER — TADALAFIL 20 MG/1
20 TABLET ORAL
Qty: 20 TAB | Refills: 5 | Status: SHIPPED | OUTPATIENT
Start: 2020-01-01 | End: 2020-01-01 | Stop reason: SDUPTHER

## 2020-01-01 RX ORDER — AMLODIPINE BESYLATE 5 MG/1
7.5 TABLET ORAL EVERY EVENING
Status: DISCONTINUED | OUTPATIENT
Start: 2020-01-01 | End: 2020-01-01 | Stop reason: HOSPADM

## 2020-01-01 RX ORDER — IPRATROPIUM BROMIDE 42 UG/1
2 SPRAY, METERED NASAL 2 TIMES DAILY
COMMUNITY
End: 2020-01-01 | Stop reason: SDUPTHER

## 2020-01-01 RX ORDER — AMLODIPINE BESYLATE 5 MG/1
TABLET ORAL
Qty: 135 TAB | Refills: 3 | Status: SHIPPED | OUTPATIENT
Start: 2020-01-01 | End: 2021-01-01

## 2020-01-01 RX ORDER — OMEPRAZOLE 20 MG/1
20 CAPSULE, DELAYED RELEASE ORAL DAILY
Qty: 90 CAP | Refills: 3 | Status: SHIPPED | OUTPATIENT
Start: 2020-01-01

## 2020-01-01 RX ORDER — IPRATROPIUM BROMIDE 42 UG/1
2 SPRAY, METERED NASAL 2 TIMES DAILY
Qty: 45 ML | Refills: 3 | Status: SHIPPED | OUTPATIENT
Start: 2020-01-01

## 2020-01-01 RX ORDER — AMLODIPINE BESYLATE 2.5 MG/1
7.5 TABLET ORAL DAILY
COMMUNITY
End: 2021-01-01 | Stop reason: SDUPTHER

## 2020-01-01 RX ORDER — NITROGLYCERIN 0.4 MG/1
0.4 TABLET SUBLINGUAL PRN
Status: DISCONTINUED | OUTPATIENT
Start: 2020-01-01 | End: 2020-01-01 | Stop reason: HOSPADM

## 2020-01-01 RX ORDER — IPRATROPIUM BROMIDE 42 UG/1
2 SPRAY, METERED NASAL 4 TIMES DAILY
Qty: 3 BOTTLE | Refills: 3 | Status: SHIPPED | OUTPATIENT
Start: 2020-01-01 | End: 2020-01-01

## 2020-01-01 RX ORDER — MAGNESIUM SULFATE 1 G/100ML
1 INJECTION INTRAVENOUS ONCE
Status: COMPLETED | OUTPATIENT
Start: 2020-01-01 | End: 2020-01-01

## 2020-01-01 RX ORDER — REGADENOSON 0.08 MG/ML
INJECTION, SOLUTION INTRAVENOUS
Status: COMPLETED
Start: 2020-01-01 | End: 2020-01-01

## 2020-01-01 RX ORDER — POTASSIUM CHLORIDE 20 MEQ/1
40 TABLET, EXTENDED RELEASE ORAL ONCE
Status: COMPLETED | OUTPATIENT
Start: 2020-01-01 | End: 2020-01-01

## 2020-01-01 RX ORDER — ATORVASTATIN CALCIUM 80 MG/1
80 TABLET, FILM COATED ORAL NIGHTLY
COMMUNITY
End: 2020-01-01 | Stop reason: SDUPTHER

## 2020-01-01 RX ORDER — AMOXICILLIN 250 MG
2 CAPSULE ORAL 2 TIMES DAILY
Status: DISCONTINUED | OUTPATIENT
Start: 2020-01-01 | End: 2020-01-01 | Stop reason: HOSPADM

## 2020-01-01 RX ORDER — BISACODYL 10 MG
10 SUPPOSITORY, RECTAL RECTAL
Status: DISCONTINUED | OUTPATIENT
Start: 2020-01-01 | End: 2020-01-01 | Stop reason: HOSPADM

## 2020-01-01 RX ORDER — LOSARTAN POTASSIUM 25 MG/1
100 TABLET ORAL EVERY EVENING
Status: DISCONTINUED | OUTPATIENT
Start: 2020-01-01 | End: 2020-01-01 | Stop reason: HOSPADM

## 2020-01-01 RX ORDER — ASPIRIN 81 MG/1
324 TABLET, CHEWABLE ORAL DAILY
Status: DISCONTINUED | OUTPATIENT
Start: 2020-01-01 | End: 2020-01-01

## 2020-01-01 RX ORDER — LABETALOL HYDROCHLORIDE 5 MG/ML
10 INJECTION, SOLUTION INTRAVENOUS EVERY 4 HOURS PRN
Status: DISCONTINUED | OUTPATIENT
Start: 2020-01-01 | End: 2020-01-01 | Stop reason: HOSPADM

## 2020-01-01 RX ORDER — AMLODIPINE BESYLATE 5 MG/1
3.75 TABLET ORAL EVERY EVENING
Status: DISCONTINUED | OUTPATIENT
Start: 2020-01-01 | End: 2020-01-01

## 2020-01-01 RX ORDER — POLYETHYLENE GLYCOL 3350 17 G/17G
1 POWDER, FOR SOLUTION ORAL
Status: DISCONTINUED | OUTPATIENT
Start: 2020-01-01 | End: 2020-01-01 | Stop reason: HOSPADM

## 2020-01-01 RX ADMIN — POTASSIUM CHLORIDE 40 MEQ: 1500 TABLET, EXTENDED RELEASE ORAL at 09:15

## 2020-01-01 RX ADMIN — LOSARTAN POTASSIUM 100 MG: 25 TABLET, FILM COATED ORAL at 17:10

## 2020-01-01 RX ADMIN — ASPIRIN 81 MG: 81 TABLET, COATED ORAL at 05:04

## 2020-01-01 RX ADMIN — METOPROLOL TARTRATE 37.5 MG: 25 TABLET, FILM COATED ORAL at 05:03

## 2020-01-01 RX ADMIN — MAGNESIUM SULFATE IN DEXTROSE 1 G: 10 INJECTION, SOLUTION INTRAVENOUS at 09:16

## 2020-01-01 RX ADMIN — REGADENOSON 0.4 MG: 0.08 INJECTION, SOLUTION INTRAVENOUS at 08:55

## 2020-01-01 RX ADMIN — ATORVASTATIN CALCIUM 80 MG: 40 TABLET, FILM COATED ORAL at 20:24

## 2020-01-01 RX ADMIN — AMLODIPINE BESYLATE 7.5 MG: 5 TABLET ORAL at 17:58

## 2020-01-01 RX ADMIN — HYDRALAZINE HYDROCHLORIDE 20 MG: 20 INJECTION INTRAMUSCULAR; INTRAVENOUS at 13:16

## 2020-01-01 RX ADMIN — OMEPRAZOLE 20 MG: 20 CAPSULE, DELAYED RELEASE ORAL at 05:04

## 2020-01-01 RX ADMIN — METOPROLOL TARTRATE 37.5 MG: 25 TABLET, FILM COATED ORAL at 17:09

## 2020-01-01 ASSESSMENT — LIFESTYLE VARIABLES
HAVE YOU EVER FELT YOU SHOULD CUT DOWN ON YOUR DRINKING: NO
CONSUMPTION TOTAL: NEGATIVE
ALCOHOL_USE: YES
TOTAL SCORE: 0
EVER FELT BAD OR GUILTY ABOUT YOUR DRINKING: NO
EVER HAD A DRINK FIRST THING IN THE MORNING TO STEADY YOUR NERVES TO GET RID OF A HANGOVER: NO
HOW MANY TIMES IN THE PAST YEAR HAVE YOU HAD 5 OR MORE DRINKS IN A DAY: 0
AVERAGE NUMBER OF DAYS PER WEEK YOU HAVE A DRINK CONTAINING ALCOHOL: 3
EVER_SMOKED: NEVER
ON A TYPICAL DAY WHEN YOU DRINK ALCOHOL HOW MANY DRINKS DO YOU HAVE: 2
HAVE PEOPLE ANNOYED YOU BY CRITICIZING YOUR DRINKING: NO

## 2020-01-01 ASSESSMENT — COGNITIVE AND FUNCTIONAL STATUS - GENERAL
SUGGESTED CMS G CODE MODIFIER DAILY ACTIVITY: CH
MOBILITY SCORE: 24
SUGGESTED CMS G CODE MODIFIER MOBILITY: CH
DAILY ACTIVITIY SCORE: 24

## 2020-01-01 ASSESSMENT — ENCOUNTER SYMPTOMS
NAUSEA: 0
SORE THROAT: 0
COUGH: 0
DIZZINESS: 0
ABDOMINAL PAIN: 0
PND: 0
SHORTNESS OF BREATH: 0
BRUISES/BLEEDS EASILY: 0
EYE REDNESS: 0
PALPITATIONS: 0
SPUTUM PRODUCTION: 0
HEADACHES: 0
FEVER: 0
SEIZURES: 0
HEMOPTYSIS: 0
PALPITATIONS: 0
EYE DISCHARGE: 0
DIARRHEA: 0
ABDOMINAL PAIN: 0
CHILLS: 0
BRUISES/BLEEDS EASILY: 0
LOSS OF CONSCIOUSNESS: 0
SPEECH CHANGE: 0
ORTHOPNEA: 0
INSOMNIA: 0
NERVOUS/ANXIOUS: 0
MYALGIAS: 0
FLANK PAIN: 0
COUGH: 0
MYALGIAS: 0
VOMITING: 0
EYE PAIN: 0
LOSS OF CONSCIOUSNESS: 0
CHILLS: 0
SHORTNESS OF BREATH: 0
STRIDOR: 0
FOCAL WEAKNESS: 0
FEVER: 0
HALLUCINATIONS: 0
BLOOD IN STOOL: 0

## 2020-01-01 ASSESSMENT — PATIENT HEALTH QUESTIONNAIRE - PHQ9
CLINICAL INTERPRETATION OF PHQ2 SCORE: 0
SUM OF ALL RESPONSES TO PHQ9 QUESTIONS 1 AND 2: 0
2. FEELING DOWN, DEPRESSED, IRRITABLE, OR HOPELESS: NOT AT ALL
1. LITTLE INTEREST OR PLEASURE IN DOING THINGS: NOT AT ALL

## 2020-01-01 ASSESSMENT — FIBROSIS 4 INDEX
FIB4 SCORE: 2.44
FIB4 SCORE: 1.99
FIB4 SCORE: 2.35
FIB4 SCORE: 1.99
FIB4 SCORE: 1.99
FIB4 SCORE: 2.05
FIB4 SCORE: 2.44

## 2020-01-01 ASSESSMENT — PAIN DESCRIPTION - DESCRIPTORS
DESCRIPTORS: ACHING;PRESSURE
DESCRIPTORS: PRESSURE

## 2020-02-12 ENCOUNTER — OFFICE VISIT (OUTPATIENT)
Dept: CARDIOLOGY | Facility: MEDICAL CENTER | Age: 75
End: 2020-02-12
Payer: MEDICARE

## 2020-02-12 VITALS
SYSTOLIC BLOOD PRESSURE: 134 MMHG | RESPIRATION RATE: 16 BRPM | DIASTOLIC BLOOD PRESSURE: 70 MMHG | WEIGHT: 204 LBS | BODY MASS INDEX: 27.04 KG/M2 | HEART RATE: 52 BPM | HEIGHT: 73 IN | OXYGEN SATURATION: 95 %

## 2020-02-12 DIAGNOSIS — I10 ESSENTIAL HYPERTENSION: ICD-10-CM

## 2020-02-12 DIAGNOSIS — R94.5 ABNORMAL LIVER FUNCTION: ICD-10-CM

## 2020-02-12 DIAGNOSIS — R73.01 IFG (IMPAIRED FASTING GLUCOSE): ICD-10-CM

## 2020-02-12 DIAGNOSIS — E78.2 MIXED HYPERLIPIDEMIA: ICD-10-CM

## 2020-02-12 DIAGNOSIS — I25.10 CORONARY ARTERY DISEASE INVOLVING NATIVE CORONARY ARTERY OF NATIVE HEART WITHOUT ANGINA PECTORIS: ICD-10-CM

## 2020-02-12 DIAGNOSIS — Z95.820 S/P ANGIOPLASTY WITH STENT: ICD-10-CM

## 2020-02-12 DIAGNOSIS — I46.9 CARDIAC ARREST (HCC): ICD-10-CM

## 2020-02-12 DIAGNOSIS — I47.20 VENTRICULAR TACHYCARDIA (HCC): ICD-10-CM

## 2020-02-12 DIAGNOSIS — R00.1 BRADYCARDIA: ICD-10-CM

## 2020-02-12 DIAGNOSIS — I25.2 HISTORY OF MI (MYOCARDIAL INFARCTION): ICD-10-CM

## 2020-02-12 DIAGNOSIS — I51.7 LVH (LEFT VENTRICULAR HYPERTROPHY): ICD-10-CM

## 2020-02-12 DIAGNOSIS — N52.01 ERECTILE DYSFUNCTION DUE TO ARTERIAL INSUFFICIENCY: ICD-10-CM

## 2020-02-12 DIAGNOSIS — D50.0 IRON DEFICIENCY ANEMIA DUE TO CHRONIC BLOOD LOSS: ICD-10-CM

## 2020-02-12 PROCEDURE — 99214 OFFICE O/P EST MOD 30 MIN: CPT | Performed by: INTERNAL MEDICINE

## 2020-02-12 NOTE — PROGRESS NOTES
Subjective:   Chief Complaint:   Chief Complaint   Patient presents with   • Coronary Artery Disease       Vinod Conrad is a 74 y.o. male who returns for further management of coronary artery disease and hypertension.      He had an anterior ST elevation MI August 15, 2018 and had a stent placed to the LAD (Ada, his wife's Bday). Was having arm and shoulder pain bilaterally for 2 days prior to presentation, finally brought to the ED, had cardiac arrest in the ED, went to the lab in arrest, had stent to LAD.  He had some minimal disease in the RCA but otherwise patent vessels, EF was 45% in the Cath Lab.   Reported Hx of VT, I am not certain of when.    Came home, had dark stools a few days later, ended up with GI bleeding, back to the hospital, had ulcers that were clipped and started on PPI. He had had prior GI bleeding about 10 year prior, had abd surgery and still could not find the bleeding.  He completed dual antiplatelet therapy and now on aspirin alone.  No more bleeding, on PPI.    No recurrence of VT during his stay or since.    He was back in the emergency room 10/12/2018 with fluttering in his chest but all of his evaluation was reassuring.    LDL was 53, on Lipitor 80 mg.  LFTs normal.    Has HTN with mild LVH with some mild bradycardia.  His BP was elevated, we made more med changes and now it is controlled, he checks twice a week at home.    We switch metoprolol to carvedilol because of his low heart rate, he felt worse on carvedilol so he tried labetalol but he also did not feel well so he went back to metoprolol.  Coreg made him feel dizzy, scalp tingling.  Labetalol caused sleep disturbance and confusion.    With increased amlodipine, mild LE edema but this does not bother him.    His BP has been up to 181 and even 200 in the morning, better after BP meds, this includes amlodipine and BB at night.  Does not have features of JOSHUA.    Has impaired fasting glucose, hemoglobin A 1 C 6 0.3.  HR  slow but no symptoms.    He rides an exercise bike every day, 6-7 miles, stationary, recumbant.     He is not limited by chest pain, pressure or tightness.   No significant dyspnea on exertion, orthopnea or lower extremity swelling.   No significant palpitations, dizziness, or presyncope/syncope.   No symptoms of leg claudication.   Rare pains, that are brief, took nitro once and didn't notice a difference, has not taken it since. Does have arthritidis pain in shoulders.    Here with Ada, his wife.  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    DATA REVIEWED by me:  ECG 10/12/2018  Sinus bradycardia, rate 41, anterior and lateral T wave inversion    Echo 8/17/2018  Mild LVH, EF 45%, akinesis of the distal septum and apex, no significant valve disease, RVSP 33 mmHg above right atrial pressure    Left heart catheterization 8/15/2018  Proximal 100% LAD occlusion, status post Xience 3.0 x 38 mm drug-eluting stent, left main free of disease, circumflex normal, proximal mid RCA with diffuse calcified atheromatous disease but patent, EF 45% with anterior apical and inferior apical wall motion abnormality    Chest x-ray 10/12/2018  No acute process      Most recent labs:     7/1/2019 LDL 53, HDL 49, to glycerides 180, total cholesterol 124, hemoglobin A1c 6.3, hemoglobin 15.6, platelet 166, sodium 141, potassium 4, creatinine 0.78    1/24/2019 LFTs normal    10/12/2018  Hemoglobin 13.5, platelet 172, sodium 138, potassium 3.7, creatinine 0.83, LFTs normal, troponin normal    8-16-18  , , HDL 55, LDL 51    1/24/2019 hemoglobin 15.6, platelet 201, sodium 139, potassium 4.3, creatinine 0.75, LFTs normal, hemoglobin A1c 6.6, total cholesterol 123, triglycerides 88, HDL 54 1, LDL 54      Past Medical History:   Diagnosis Date   • Allergy    • Anesthesia 2006    Hallucinations post anesthesia, at home   • Arthritis     all joints   • CAD (coronary artery disease)  "08/15/2018    PCI/ESTEFANY x 2 to the proximal mid LAD (Xience 3.0 x 38mm, 3.0 x 8mm).   • CATARACT     bilateral IOL   • Hyperlipidemia    • Hypertension    • Infectious disease     C. Difficile   • Pain    • Unspecified hemorrhagic conditions     \"abd bleeding\"     Past Surgical History:   Procedure Laterality Date   • GASTROSCOPY N/A 2018    Procedure: GASTROSCOPY;  Surgeon: Amari Juárez M.D.;  Location: SURGERY [facility];  Service: Gastroenterology   • CERVICAL FORAMINOTOMY  2013    Performed by Krish Yang M.D. at SURGERY [facility]   • OTHER      torn retina RIGHT EYE   • SCLERAL BUCKLING  2009    Performed by YVETTE DRAPER at SURGERY SAME DAY [facility]S   • OTHER ORTHOPEDIC SURGERY      l knee     Family History   Problem Relation Age of Onset   • Heart Failure Mother 78         at 78   • Heart Disease Brother 70        3 stents     Social History     Socioeconomic History   • Marital status:      Spouse name: Not on file   • Number of children: Not on file   • Years of education: Not on file   • Highest education level: Not on file   Occupational History   • Not on file   Social Needs   • Financial resource strain: Not on file   • Food insecurity     Worry: Not on file     Inability: Not on file   • Transportation needs     Medical: Not on file     Non-medical: Not on file   Tobacco Use   • Smoking status: Former Smoker     Packs/day: 2.00     Years: 30.00     Pack years: 60.00     Types: Cigarettes     Last attempt to quit: 1998     Years since quittin.1   • Smokeless tobacco: Never Used   Substance and Sexual Activity   • Alcohol use: Yes     Alcohol/week: 0.0 oz     Comment: 5 per week   • Drug use: No   • Sexual activity: Not on file   Lifestyle   • Physical activity     Days per week: Not on file     Minutes per session: Not on file   • Stress: Not on file   Relationships   • Social connections     Talks on phone: Not on file     " Gets together: Not on file     Attends Jainism service: Not on file     Active member of club or organization: Not on file     Attends meetings of clubs or organizations: Not on file     Relationship status: Not on file   • Intimate partner violence     Fear of current or ex partner: Not on file     Emotionally abused: Not on file     Physically abused: Not on file     Forced sexual activity: Not on file   Other Topics Concern   • Not on file   Social History Narrative   • Not on file     Allergies   Allergen Reactions   • Aspirin      INTERNAL BLEEDING, 325 mg   • Augmentin Vomiting   • Clindamycin      Develop c.diff   • Metronidazole      Not sure   • Nsaids Unspecified     GI bleed   • Vancomycin Swelling   • Ace Inhibitors Cough       Current Outpatient Medications   Medication Sig Dispense Refill   • atorvastatin (LIPITOR) 80 MG tablet TAKE 1 TABLET BY MOUTH EVERYDAY AT BEDTIME 90 Tab 3   • ipratropium (ATROVENT) 0.06 % Solution Spray 2 Sprays in nose 4 times a day. 1 Bottle 5   • tadalafil (CIALIS) 20 MG tablet Take 1 Tab by mouth 1 time daily as needed for Erectile Dysfunction. 20 Tab 5   • amLODIPine (NORVASC) 2.5 MG Tab Take 3 Tabs by mouth every day. 270 Tab 3   • metoprolol (LOPRESSOR) 25 MG Tab Take 1.5 Tabs by mouth 2 times a day. 270 Tab 3   • losartan (COZAAR) 100 MG Tab Take 1 Tab by mouth every day. 90 Tab 3   • omeprazole (PRILOSEC) 20 MG delayed-release capsule Take 1 Cap by mouth every day. 90 Cap 3   • acetaminophen (TYLENOL) 325 MG Tab Take 650 mg by mouth every four hours as needed.     • nitroglycerin (NITROSTAT) 0.4 MG SL Tab Place 1 Tab under tongue as needed for Chest Pain. Up to 3 every five minutes 25 Tab 11   • aspirin EC 81 MG EC tablet Take 1 Tab by mouth every day. 30 Tab 3     No current facility-administered medications for this visit.        ROS  All others systems reviewed and negative.     Objective:     /70 (BP Location: Right arm, Patient Position: Sitting, BP Cuff  "Size: Adult)   Pulse (!) 52   Resp 16   Ht 1.854 m (6' 1\")   Wt 92.5 kg (204 lb)   SpO2 95%  Body mass index is 26.91 kg/m².    Physical Exam   General: No acute distress. Well nourished.  HEENT: EOM grossly intact, no scleral icterus, no pharyngeal erythema.   Neck:  No JVD, no bruits, trachea midline  CVS: Slow, regular. 1/6 BIRDIE RSB. No LE edema.  2+ radial pulses, 2+ DP pulses  Resp: CTAB. No wheezing or crackles/rhonchi. Normal respiratory effort.  Abdomen: Soft, NT, no aneudy hepatomegaly, well healed midline incision, calcified xyphoid process.  MSK/Ext: No clubbing or cyanosis.  Skin: Warm and dry, no rashes.  Neurological: CN III-XII grossly intact. No focal deficits.   Psych: A&O x 3, appropriate affect, good judgement    Physical exam performed today and unchanged compared to 8-23-19      Assessment:     1. Coronary artery disease involving native coronary artery of native heart without angina pectoris     2. Essential hypertension     3. S/P angioplasty with stent     4. Cardiac arrest (HCC)     5. Ventricular tachycardia (HCC)     6. Abnormal liver function     7. Iron deficiency anemia due to chronic blood loss     8. IFG (impaired fasting glucose)     9. Erectile dysfunction due to arterial insufficiency     10. History of MI (myocardial infarction)     11. Mixed hyperlipidemia     12. Bradycardia     13. LVH (left ventricular hypertrophy)         Medical Decision Making:  Today's Assessment / Status / Plan:     -He would be ready for one year visits except for his BP.  -Monitor slow HR, no symptoms at present  -If BP goes but up, consider HCTZ (did not tolerate coreg or labetalol)  -Consider overnight pulse oximetry to screen for sleep apnea but it does not tell the whole story.  If I cannot get your BP controlled and you are still having high BP in the morning I might ask you to have a complete sleep study.  -Cont PRN nitro but has not needed this and knows not to take cialis and nitro within 24 " hours.  -Gets blood work with PCP.  -We discussed s/sx of ACS.  -If he develops DM2, then SGLT2-I at that time, still IFG at this point.  -RTC 1 year but he is to call with any changes or if BP not to goal      Return in about 1 year (around 2/12/2021).    It is my pleasure to participate in the care of Mr. Conrad.  Please do not hesitate to contact me with questions or concerns.    iSl Jacobo MD, [facility]  Cardiologist [facility]    Please note that this dictation was created using voice recognition software. I have made every reasonable attempt to correct obvious errors, but it is possible there are errors of grammar and possibly content that I did not discover before finalizing the note.

## 2020-02-12 NOTE — LETTER
[facility]   [address]   [address]  [place] [address]  Phone: [phone]  Fax: [phone]              Vinod Conrad  1945    Encounter Date: 2/12/2020    Sil Jacobo M.D.          PROGRESS NOTE:  Subjective:   Chief Complaint:   Chief Complaint   Patient presents with   • Coronary Artery Disease       Vinod Conrad is a 74 y.o. male who returns for further management of coronary artery disease and hypertension.      He had an anterior ST elevation MI August 15, 2018 and had a stent placed to the LAD (Ada, his wife's Bday). Was having arm and shoulder pain bilaterally for 2 days prior to presentation, finally brought to the ED, had cardiac arrest in the ED, went to the lab in arrest, had stent to LAD.  He had some minimal disease in the RCA but otherwise patent vessels, EF was 45% in the Cath Lab.   Reported Hx of VT, I am not certain of when.    Came home, had dark stools a few days later, ended up with GI bleeding, back to the hospital, had ulcers that were clipped and started on PPI. He had had prior GI bleeding about 10 year prior, had abd surgery and still could not find the bleeding.  He completed dual antiplatelet therapy and now on aspirin alone.  No more bleeding, on PPI.    No recurrence of VT during his stay or since.    He was back in the emergency room 10/12/2018 with fluttering in his chest but all of his evaluation was reassuring.    LDL was 53, on Lipitor 80 mg.  LFTs normal.    Has HTN with mild LVH with some mild bradycardia.  His BP was elevated, we made more med changes and now it is controlled, he checks twice a week at home.    We switch metoprolol to carvedilol because of his low heart rate, he felt worse on carvedilol so he tried labetalol but he also did not feel well so he went back to metoprolol.  Coreg made him feel dizzy, scalp tingling.  Labetalol caused sleep disturbance and confusion.     With increased amlodipine, mild LE edema but this does not bother him.    His BP has been up to 181 and even 200 in the morning, better after BP meds, this includes amlodipine and BB at night.  Does not have features of JOSHUA.    Has impaired fasting glucose, hemoglobin A 1 C 6 0.3.  HR slow but no symptoms.    He rides an exercise bike every day, 6-7 miles, stationary, recumbant.     He is not limited by chest pain, pressure or tightness.   No significant dyspnea on exertion, orthopnea or lower extremity swelling.   No significant palpitations, dizziness, or presyncope/syncope.   No symptoms of leg claudication.   Rare pains, that are brief, took nitro once and didn't notice a difference, has not taken it since. Does have arthritidis pain in shoulders.    Here with Ada, his wife.  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    DATA REVIEWED by me:  ECG 10/12/2018  Sinus bradycardia, rate 41, anterior and lateral T wave inversion    Echo 8/17/2018  Mild LVH, EF 45%, akinesis of the distal septum and apex, no significant valve disease, RVSP 33 mmHg above right atrial pressure    Left heart catheterization 8/15/2018  Proximal 100% LAD occlusion, status post Xience 3.0 x 38 mm drug-eluting stent, left main free of disease, circumflex normal, proximal mid RCA with diffuse calcified atheromatous disease but patent, EF 45% with anterior apical and inferior apical wall motion abnormality    Chest x-ray 10/12/2018  No acute process      Most recent labs:     7/1/2019 LDL 53, HDL 49, to glycerides 180, total cholesterol 124, hemoglobin A1c 6.3, hemoglobin 15.6, platelet 166, sodium 141, potassium 4, creatinine 0.78    1/24/2019 LFTs normal    10/12/2018  Hemoglobin 13.5, platelet 172, sodium 138, potassium 3.7, creatinine 0.83, LFTs normal, troponin normal    8-16-18  , , HDL 55, LDL 51    1/24/2019 hemoglobin 15.6, platelet 201, sodium 139, potassium 4.3,  "creatinine 0.75, LFTs normal, hemoglobin A1c 6.6, total cholesterol 123, triglycerides 88, HDL 54 1, LDL 54      Past Medical History:   Diagnosis Date   • Allergy    • Anesthesia     Hallucinations post anesthesia, at home   • Arthritis     all joints   • CAD (coronary artery disease) 08/15/2018    PCI/ESTEFANY x 2 to the proximal mid LAD (Xience 3.0 x 38mm, 3.0 x 8mm).   • CATARACT     bilateral IOL   • Hyperlipidemia    • Hypertension    • Infectious disease     C. Difficile   • Pain    • Unspecified hemorrhagic conditions 2006    \"abd bleeding\"     Past Surgical History:   Procedure Laterality Date   • GASTROSCOPY N/A 2018    Procedure: GASTROSCOPY;  Surgeon: Amari Juárez M.D.;  Location: SURGERY [facility];  Service: Gastroenterology   • CERVICAL FORAMINOTOMY  2013    Performed by Krish Yang M.D. at SURGERY [facility] ORS   • OTHER      torn retina RIGHT EYE   • SCLERAL BUCKLING  2009    Performed by YVETTE DRAPER at SURGERY SAME DAY [facility] ORS   • OTHER ORTHOPEDIC SURGERY      l knee     Family History   Problem Relation Age of Onset   • Heart Failure Mother 78         at 78   • Heart Disease Brother 70        3 stents     Social History     Socioeconomic History   • Marital status:      Spouse name: Not on file   • Number of children: Not on file   • Years of education: Not on file   • Highest education level: Not on file   Occupational History   • Not on file   Social Needs   • Financial resource strain: Not on file   • Food insecurity     Worry: Not on file     Inability: Not on file   • Transportation needs     Medical: Not on file     Non-medical: Not on file   Tobacco Use   • Smoking status: Former Smoker     Packs/day: 2.00     Years: 30.00     Pack years: 60.00     Types: Cigarettes     Last attempt to quit: 1998     Years since quittin.1   • Smokeless tobacco: Never Used   Substance and Sexual Activity   • Alcohol use: Yes     " Alcohol/week: 0.0 oz     Comment: 5 per week   • Drug use: No   • Sexual activity: Not on file   Lifestyle   • Physical activity     Days per week: Not on file     Minutes per session: Not on file   • Stress: Not on file   Relationships   • Social connections     Talks on phone: Not on file     Gets together: Not on file     Attends Yazidism service: Not on file     Active member of club or organization: Not on file     Attends meetings of clubs or organizations: Not on file     Relationship status: Not on file   • Intimate partner violence     Fear of current or ex partner: Not on file     Emotionally abused: Not on file     Physically abused: Not on file     Forced sexual activity: Not on file   Other Topics Concern   • Not on file   Social History Narrative   • Not on file     Allergies   Allergen Reactions   • Aspirin      INTERNAL BLEEDING, 325 mg   • Augmentin Vomiting   • Clindamycin      Develop c.diff   • Metronidazole      Not sure   • Nsaids Unspecified     GI bleed   • Vancomycin Swelling   • Ace Inhibitors Cough       Current Outpatient Medications   Medication Sig Dispense Refill   • atorvastatin (LIPITOR) 80 MG tablet TAKE 1 TABLET BY MOUTH EVERYDAY AT BEDTIME 90 Tab 3   • ipratropium (ATROVENT) 0.06 % Solution Spray 2 Sprays in nose 4 times a day. 1 Bottle 5   • tadalafil (CIALIS) 20 MG tablet Take 1 Tab by mouth 1 time daily as needed for Erectile Dysfunction. 20 Tab 5   • amLODIPine (NORVASC) 2.5 MG Tab Take 3 Tabs by mouth every day. 270 Tab 3   • metoprolol (LOPRESSOR) 25 MG Tab Take 1.5 Tabs by mouth 2 times a day. 270 Tab 3   • losartan (COZAAR) 100 MG Tab Take 1 Tab by mouth every day. 90 Tab 3   • omeprazole (PRILOSEC) 20 MG delayed-release capsule Take 1 Cap by mouth every day. 90 Cap 3   • acetaminophen (TYLENOL) 325 MG Tab Take 650 mg by mouth every four hours as needed.     • nitroglycerin (NITROSTAT) 0.4 MG SL Tab Place 1 Tab under tongue as needed for Chest Pain. Up to 3 every five  "minutes 25 Tab 11   • aspirin EC 81 MG EC tablet Take 1 Tab by mouth every day. 30 Tab 3     No current facility-administered medications for this visit.        ROS  All others systems reviewed and negative.     Objective:     /70 (BP Location: Right arm, Patient Position: Sitting, BP Cuff Size: Adult)   Pulse (!) 52   Resp 16   Ht 1.854 m (6' 1\")   Wt 92.5 kg (204 lb)   SpO2 95%  Body mass index is 26.91 kg/m².    Physical Exam   General: No acute distress. Well nourished.  HEENT: EOM grossly intact, no scleral icterus, no pharyngeal erythema.   Neck:  No JVD, no bruits, trachea midline  CVS: Slow, regular. 1/6 IBRDIE RSB. No LE edema.  2+ radial pulses, 2+ DP pulses  Resp: CTAB. No wheezing or crackles/rhonchi. Normal respiratory effort.  Abdomen: Soft, NT, no aneudy hepatomegaly, well healed midline incision, calcified xyphoid process.  MSK/Ext: No clubbing or cyanosis.  Skin: Warm and dry, no rashes.  Neurological: CN III-XII grossly intact. No focal deficits.   Psych: A&O x 3, appropriate affect, good judgement    Physical exam performed today and unchanged compared to 8-23-19      Assessment:     1. Coronary artery disease involving native coronary artery of native heart without angina pectoris     2. Essential hypertension     3. S/P angioplasty with stent     4. Cardiac arrest (HCC)     5. Ventricular tachycardia (HCC)     6. Abnormal liver function     7. Iron deficiency anemia due to chronic blood loss     8. IFG (impaired fasting glucose)     9. Erectile dysfunction due to arterial insufficiency     10. History of MI (myocardial infarction)     11. Mixed hyperlipidemia     12. Bradycardia     13. LVH (left ventricular hypertrophy)         Medical Decision Making:  Today's Assessment / Status / Plan:     -He would be ready for one year visits except for his BP.  -Monitor slow HR, no symptoms at present  -If BP goes but up, consider HCTZ (did not tolerate coreg or labetalol)  -Consider overnight " pulse oximetry to screen for sleep apnea but it does not tell the whole story.  If I cannot get your BP controlled and you are still having high BP in the morning I might ask you to have a complete sleep study.  -Cont PRN nitro but has not needed this and knows not to take cialis and nitro within 24 hours.  -Gets blood work with PCP.  -We discussed s/sx of ACS.  -If he develops DM2, then SGLT2-I at that time, still IFG at this point.  -RTC 1 year but he is to call with any changes or if BP not to goal      Return in about 1 year (around 2/12/2021).    It is my pleasure to participate in the care of Mr. Conrad.  Please do not hesitate to contact me with questions or concerns.    Sil Jacobo MD, [facility]  Cardiologist [facility]    Please note that this dictation was created using voice recognition software. I have made every reasonable attempt to correct obvious errors, but it is possible there are errors of grammar and possibly content that I did not discover before finalizing the note.      Reid Wills M.D.  [address]  [id number]  [place] [address]  VIA In Basket

## 2020-02-27 RX ORDER — OMEPRAZOLE 20 MG/1
CAPSULE, DELAYED RELEASE ORAL
Qty: 90 CAP | Refills: 3 | Status: SHIPPED | OUTPATIENT
Start: 2020-02-27 | End: 2020-01-01

## 2020-03-04 ENCOUNTER — OFFICE VISIT (OUTPATIENT)
Dept: MEDICAL GROUP | Facility: MEDICAL CENTER | Age: 75
End: 2020-03-04
Payer: MEDICARE

## 2020-03-04 VITALS
OXYGEN SATURATION: 95 % | SYSTOLIC BLOOD PRESSURE: 132 MMHG | DIASTOLIC BLOOD PRESSURE: 68 MMHG | HEART RATE: 56 BPM | WEIGHT: 205.6 LBS | TEMPERATURE: 97.5 F | RESPIRATION RATE: 18 BRPM | BODY MASS INDEX: 27.13 KG/M2

## 2020-03-04 DIAGNOSIS — J01.10 ACUTE NON-RECURRENT FRONTAL SINUSITIS: ICD-10-CM

## 2020-03-04 PROCEDURE — 99214 OFFICE O/P EST MOD 30 MIN: CPT | Performed by: FAMILY MEDICINE

## 2020-03-04 RX ORDER — AZITHROMYCIN 250 MG/1
TABLET, FILM COATED ORAL
Qty: 6 TAB | Refills: 0 | Status: SHIPPED | OUTPATIENT
Start: 2020-03-04 | End: 2020-03-09

## 2020-03-04 ASSESSMENT — FIBROSIS 4 INDEX: FIB4 SCORE: 2.02

## 2020-03-04 NOTE — PROGRESS NOTES
Subjective:   Vinod Conrad is a 74 y.o. male here today for sinusitis    Has been having 3 days of symptoms starting with sore throat, with sinus pressure and dark phlegm. He has been using ipratropium 0.06% that helps with symptoms but still having significant symptoms. He has about 1 sinus infection per year around this time, with a Z-ana being helpful in the past.      Current medicines (including changes today)  Current Outpatient Medications   Medication Sig Dispense Refill   • azithromycin (ZITHROMAX) 250 MG Tab 2 tabs by mouth day 1, 1 tab by mouth days 2-5 6 Tab 0   • omeprazole (PRILOSEC) 20 MG delayed-release capsule TAKE 1 CAPSULE BY MOUTH EVERY DAY 90 Cap 3   • atorvastatin (LIPITOR) 80 MG tablet TAKE 1 TABLET BY MOUTH EVERYDAY AT BEDTIME 90 Tab 3   • ipratropium (ATROVENT) 0.06 % Solution Spray 2 Sprays in nose 4 times a day. 1 Bottle 5   • tadalafil (CIALIS) 20 MG tablet Take 1 Tab by mouth 1 time daily as needed for Erectile Dysfunction. 20 Tab 5   • amLODIPine (NORVASC) 2.5 MG Tab Take 3 Tabs by mouth every day. 270 Tab 3   • metoprolol (LOPRESSOR) 25 MG Tab Take 1.5 Tabs by mouth 2 times a day. 270 Tab 3   • losartan (COZAAR) 100 MG Tab Take 1 Tab by mouth every day. 90 Tab 3   • acetaminophen (TYLENOL) 325 MG Tab Take 650 mg by mouth every four hours as needed.     • nitroglycerin (NITROSTAT) 0.4 MG SL Tab Place 1 Tab under tongue as needed for Chest Pain. Up to 3 every five minutes 25 Tab 11   • aspirin EC 81 MG EC tablet Take 1 Tab by mouth every day. 30 Tab 3     No current facility-administered medications for this visit.      He  has a past medical history of Allergy, Anesthesia (2006), Arthritis, CAD (coronary artery disease) (08/15/2018), CATARACT, Hyperlipidemia, Hypertension, Infectious disease (2011), Pain, and Unspecified hemorrhagic conditions (2006). He also has no past medical history of ASTHMA.    ROS   No fever, no cough, no diarrhea, no shortness of breath.       Objective:      /68 (BP Location: Right arm, Patient Position: Sitting, BP Cuff Size: Adult)   Pulse (!) 56   Temp 36.4 °C (97.5 °F) (Temporal)   Resp 18   Wt 93.3 kg (205 lb 9.6 oz)   SpO2 95%  Body mass index is 27.13 kg/m².   Physical Exam:  Constitutional: Alert, no distress.  Skin: Warm, dry, good turgor, no rashes in visible areas.  Eye: Equal, round and reactive, conjunctiva clear, lids normal.  ENMT: Lips without lesions, good dentition, oropharynx clear. TMs pearly gray bilaterally. Thick nasal drainage with frontal sinus pressure.  Neck: Trachea midline, no masses, no thyromegaly. No cervical or supraclavicular lymphadenopathy  Respiratory: Unlabored respiratory effort, lungs clear to auscultation, no wheezes, no ronchi.  Psych: Alert and oriented x3, normal affect and mood.        Assessment and Plan:   The following treatment plan was discussed    1. Acute non-recurrent frontal sinusitis  New problem.  Discussed viral versus bacterial, patient has significant health concerns with diabetes and heart disease.  Advised nasal saline rinses.  Continue with as needed ipratropium nasal spray.  Prescription for Z-Reji.  - azithromycin (ZITHROMAX) 250 MG Tab; 2 tabs by mouth day 1, 1 tab by mouth days 2-5  Dispense: 6 Tab; Refill: 0      Followup: Return if symptoms worsen or fail to improve.

## 2020-07-07 PROBLEM — R07.9 PAIN IN THE CHEST: Status: ACTIVE | Noted: 2020-01-01

## 2020-07-07 PROBLEM — I16.0 HYPERTENSIVE URGENCY: Status: ACTIVE | Noted: 2020-01-01

## 2020-07-07 NOTE — ASSESSMENT & PLAN NOTE
We will start hydralazine and labetalol as needed for extreme hypertension  Resume home amlodipine, metoprolol losartan with hold parameters

## 2020-07-07 NOTE — ED PROVIDER NOTES
"ED Provider Note    CHIEF COMPLAINT  Chief Complaint   Patient presents with   • Chest Pain       HPI  Vinod Conrad is a 75 y.o. male who presents with chest pain.  Patient's pain started yesterday.  He describes it as a very dull pressure.  He states he rode his exercise bike yesterday without any worsening of his pain.  He has a history of cardiac stent 2 years ago.  He denies any recent stress test.  He denies any cough or fevers or recent illness.  He denies any vomiting.  He denies any ripping or tearing back pain.  Patient states he is allergic to aspirin but is able to take it if he takes omeprazole with it.  He has a history of GI bleed from taking aspirin.  He denies any leg swelling or calf pain.  No history of blood clots.  He currently is pain-free.  Denies dizziness or syncope.    REVIEW OF SYSTEMS  See HPI for further details. All other systems are negative.     PAST MEDICAL HISTORY  Past Medical History:   Diagnosis Date   • CAD (coronary artery disease) 08/15/2018    PCI/ESTEFANY x 2 to the proximal mid LAD (Xience 3.0 x 38mm, 3.0 x 8mm).   • Infectious disease 2011    C. Difficile   • Anesthesia 2006    Hallucinations post anesthesia, at home   • Unspecified hemorrhagic conditions 2006    \"abd bleeding\"   • Allergy    • Arthritis     all joints   • CATARACT     bilateral IOL   • Hyperlipidemia    • Hypertension    • Pain        FAMILY HISTORY  [unfilled]    SOCIAL HISTORY  Social History     Socioeconomic History   • Marital status:      Spouse name: Not on file   • Number of children: Not on file   • Years of education: Not on file   • Highest education level: Not on file   Occupational History   • Not on file   Social Needs   • Financial resource strain: Not on file   • Food insecurity     Worry: Not on file     Inability: Not on file   • Transportation needs     Medical: Not on file     Non-medical: Not on file   Tobacco Use   • Smoking status: Former Smoker     Packs/day: 2.00     Years: " 30.00     Pack years: 60.00     Types: Cigarettes     Last attempt to quit: 1998     Years since quittin.5   • Smokeless tobacco: Never Used   Substance and Sexual Activity   • Alcohol use: Yes     Alcohol/week: 0.0 oz     Comment: 5 per week   • Drug use: No   • Sexual activity: Not on file   Lifestyle   • Physical activity     Days per week: Not on file     Minutes per session: Not on file   • Stress: Not on file   Relationships   • Social connections     Talks on phone: Not on file     Gets together: Not on file     Attends Zoroastrianism service: Not on file     Active member of club or organization: Not on file     Attends meetings of clubs or organizations: Not on file     Relationship status: Not on file   • Intimate partner violence     Fear of current or ex partner: Not on file     Emotionally abused: Not on file     Physically abused: Not on file     Forced sexual activity: Not on file   Other Topics Concern   • Not on file   Social History Narrative   • Not on file       SURGICAL HISTORY  Past Surgical History:   Procedure Laterality Date   • GASTROSCOPY N/A 2018    Procedure: GASTROSCOPY;  Surgeon: Amari Juárez M.D.;  Location: SURGERY [facility];  Service: Gastroenterology   • CERVICAL FORAMINOTOMY  2013    Performed by Krish Yang M.D. at SURGERY [facility]   • OTHER      torn retina RIGHT EYE   • SCLERAL BUCKLING  2009    Performed by YVETTE DRAPER at SURGERY SAME DAY [facility] ORS   • OTHER ORTHOPEDIC SURGERY      l knee       CURRENT MEDICATIONS   Home Medications     Reviewed by Tania Rice (Pharmacy Tech) on 20 at 0959  Med List Status: Complete   Medication Last Dose Status   Acetaminophen (TYLENOL PO) 2020 Active   amLODIPine (NORVASC) 2.5 MG Tab 2020 Active   aspirin EC 81 MG EC tablet 2020 Active   atorvastatin (LIPITOR) 80 MG tablet 2020 Active   ipratropium (ATROVENT) 0.06 % Solution 2020 Active   losartan  (COZAAR) 100 MG Tab 7/6/2020 Active   metoprolol (LOPRESSOR) 25 MG Tab 7/7/2020 Active   nitroglycerin (NITROSTAT) 0.4 MG SL Tab > 5 months Active   omeprazole (PRILOSEC) 20 MG delayed-release capsule 7/7/2020 Active   tadalafil (CIALIS) 20 MG tablet 7/5/2020 Active                ALLERGIES  Allergies   Allergen Reactions   • Aspirin      INTERNAL BLEEDING, 325 mg, pt reports he is ok to take ASPRIN 81MG only if he takes OMEPRAZOLE with his ASPIRIN   • Augmentin Vomiting   • Clindamycin      Develop c.diff   • Metronidazole      Not sure   • Nsaids Unspecified     GI bleed   • Vancomycin Swelling   • Ace Inhibitors Cough       PHYSICAL EXAM  VITAL SIGNS: /68   Pulse (!) 55   Temp 36 °C (96.8 °F)   Resp 18   Wt 92 kg (202 lb 13.2 oz)   SpO2 95%   BMI 26.76 kg/m²       Constitutional: Well developed, No acute distress, Non-toxic appearance.   HENT: Normocephalic, Atraumatic, Bilateral external ears normal, Oropharynx moist  Eyes: PERRL, EOMI, Conjunctiva normal  Neck: Normal range of motion, No tenderness, Supple   Cardiovascular: Normal heart rate, Normal rhythm  Thorax & Lungs: Normal breath sounds, No respiratory distress,No wheezing, No chest tenderness.   Abdomen: Benign abdominal exam, no guarding no rebound, no tenderness, no distention  Skin: Warm, Dry, No erythema, No rash.   Back: No tenderness, No CVA tenderness.   Extremities: Intact distal pulses, No edema, No tenderness   Neurologic: Alert & oriented x 3, Normal motor function, Normal sensory function, No focal deficits noted.   Psychiatric: appropriate      Labs  Results for orders placed or performed during the hospital encounter of 07/07/20   CBC w/ Differential   Result Value Ref Range    WBC 7.5 4.8 - 10.8 K/uL    RBC 4.87 4.70 - 6.10 M/uL    Hemoglobin 15.2 14.0 - 18.0 g/dL    Hematocrit 46.7 42.0 - 52.0 %    MCV 95.9 81.4 - 97.8 fL    MCH 31.2 27.0 - 33.0 pg    MCHC 32.5 (L) 33.7 - 35.3 g/dL    RDW 46.1 35.9 - 50.0 fL    Platelet Count  179 164 - 446 K/uL    MPV 10.2 9.0 - 12.9 fL    Neutrophils-Polys 68.10 44.00 - 72.00 %    Lymphocytes 22.50 22.00 - 41.00 %    Monocytes 7.20 0.00 - 13.40 %    Eosinophils 1.20 0.00 - 6.90 %    Basophils 0.70 0.00 - 1.80 %    Immature Granulocytes 0.30 0.00 - 0.90 %    Nucleated RBC 0.00 /100 WBC    Neutrophils (Absolute) 5.11 1.82 - 7.42 K/uL    Lymphs (Absolute) 1.69 1.00 - 4.80 K/uL    Monos (Absolute) 0.54 0.00 - 0.85 K/uL    Eos (Absolute) 0.09 0.00 - 0.51 K/uL    Baso (Absolute) 0.05 0.00 - 0.12 K/uL    Immature Granulocytes (abs) 0.02 0.00 - 0.11 K/uL    NRBC (Absolute) 0.00 K/uL   Complete Metabolic Panel (CMP)   Result Value Ref Range    Sodium 139 135 - 145 mmol/L    Potassium 4.2 3.6 - 5.5 mmol/L    Chloride 102 96 - 112 mmol/L    Co2 25 20 - 33 mmol/L    Anion Gap 12.0 7.0 - 16.0    Glucose 241 (H) 65 - 99 mg/dL    Bun 13 8 - 22 mg/dL    Creatinine 0.72 0.50 - 1.40 mg/dL    Calcium 9.4 8.4 - 10.2 mg/dL    AST(SGOT) 27 12 - 45 U/L    ALT(SGPT) 22 2 - 50 U/L    Alkaline Phosphatase 80 30 - 99 U/L    Total Bilirubin 0.6 0.1 - 1.5 mg/dL    Albumin 4.5 3.2 - 4.9 g/dL    Total Protein 7.1 6.0 - 8.2 g/dL    Globulin 2.6 1.9 - 3.5 g/dL    A-G Ratio 1.7 g/dL   Troponin STAT   Result Value Ref Range    Troponin T 9 6 - 19 ng/L   ESTIMATED GFR   Result Value Ref Range    GFR If African American >60 >60 mL/min/1.73 m 2    GFR If Non African American >60 >60 mL/min/1.73 m 2   COVID/SARS CoV-2 PCR    Specimen: Nasopharyngeal; Respirate   Result Value Ref Range    COVID Order Status Received    EKG   Result Value Ref Range    Report       [facility] Emergency Dept.    Test Date:  2020  Pt Name:    RAINE ERNST               Department: [facility]  MRN:        [id number]                      [facility]  Gender:     Male                         Technician: RONAN  :        1945                   Requested By:ER TRIAGE PROTOCOL  Order #:    [id number]                    Trevor MD:  Rhiannon Roche    Measurements  Intervals                                Axis  Rate:       71                           P:          71  KS:         169                          QRS:        49  QRSD:       118                          T:          38  QT:         404  QTc:        439    Interpretive Statements  Sinus rhythm  Multiple ventricular premature complexes  Nonspecific intraventricular conduction delay  Compared to ECG 10/12/2018 12:59:07  Ventricular premature complex(es) now present  Intraventricular conduction delay now present  Sinus bradycardia no longer present  Electronically Signed On 7-7 -2020 10:51:12 PDT by Rhiannon Roche         RADIOLOGY/PROCEDURES  DX-CHEST-PORTABLE (1 VIEW)   Final Result      No acute cardiac or pulmonary abnormalities are identified.          COURSE & MEDICAL DECISION MAKING  Pertinent Labs & Imaging studies reviewed. (See chart for details)  Patient presents the emergency department complaining of chest pain.  Denies worsening with exertion.  History of stents in the past.  Complains of a intermittent dull pressure.  EKG shows normal sinus rhythm with PVCs.  No evidence of acute MI.  No history of recent fevers or illness to suggest an infectious etiology for his chest pain.  No leg swelling or calf pain and I doubt PE.  Patient is not hypoxic or any respiratory distress.  Patient denies any ripping or tearing back pain and I doubt dissection.    Laboratory studies have returned and show a normal troponin.  Chest x-ray was also normal.  I have held aspirin at this time due to this questionable allergy.  He is remained pain-free here.  Due to his cardiac history patient will be hospitalized for further evaluation of his chest pain.    FINAL IMPRESSION     1. Acute chest pain    2. Hyperglycemia             Electronically signed by: Rhiannon Zhang M.D., 7/7/2020 11:22 AM

## 2020-07-07 NOTE — ED NOTES
Med rec updated and complete  Allergies reviewed, pt reports that he can take his ASPIRIN 81MG only if he takes his OMEPRAZOLE   Interviewed pt with wife at bedside with permission from pt.  Pt reports no vitamins   Pt reports no antibiotics in the last 2 weeks

## 2020-07-07 NOTE — ED NOTES
Covid swab obtained, labeled and sent to lab, POC discussed awaiting admitting MD.  Report to Ana Maria STOCKTON.

## 2020-07-07 NOTE — H&P
Hospital Medicine History & Physical Note    Date of Service  7/7/2020    Primary Care Physician  Reid Wills M.D.    Code Status  Full Code    Chief Complaint  Chief Complaint   Patient presents with   • Chest Pain       History of Presenting Illness  75 y.o. male with past medical history of coronary artery disease status post stent placement August 2020, hypertension, dyslipidemia who presented 7/7/2020 with chest pain while exercising on his treadmill.  Patient reports that the pain is moderate, rated as 5 out of 10, retrosternal, occurred during exercising on his treadmill, resolved with rest.  No radiation to other places.  No associated nausea or vomiting.    Review of Systems  Review of Systems   Constitutional: Negative for chills and fever.   HENT: Negative for congestion and sore throat.    Eyes: Negative for pain, discharge and redness.   Respiratory: Negative for cough, hemoptysis, sputum production, shortness of breath and stridor.    Cardiovascular: Positive for chest pain. Negative for palpitations and leg swelling.   Gastrointestinal: Negative for abdominal pain, blood in stool, diarrhea and vomiting.   Genitourinary: Negative for flank pain, hematuria and urgency.   Musculoskeletal: Negative for myalgias.   Skin: Negative.    Neurological: Negative for speech change, focal weakness, seizures and loss of consciousness.   Endo/Heme/Allergies: Does not bruise/bleed easily.   Psychiatric/Behavioral: Negative for hallucinations and suicidal ideas. The patient is not nervous/anxious.      Past Medical History   has a past medical history of Allergy, Anesthesia (2006), Arthritis, CAD (coronary artery disease) (08/15/2018), CATARACT, Hyperlipidemia, Hypertension, Infectious disease (2011), Pain, and Unspecified hemorrhagic conditions (2006).    Surgical History   has a past surgical history that includes scleral buckling (1/13/2009); other orthopedic surgery (2005); other (2010); cervical foraminotomy  (5/20/2013); and gastroscopy (N/A, 8/23/2018).     Family History  family history includes Heart Disease (age of onset: 70) in his brother; Heart Failure (age of onset: 78) in his mother.     Social History   reports that he quit smoking about 22 years ago. His smoking use included cigarettes. He has a 60.00 pack-year smoking history. He has never used smokeless tobacco. He reports current alcohol use. He reports that he does not use drugs.    Allergies  Allergies   Allergen Reactions   • Aspirin      INTERNAL BLEEDING, 325 mg, pt reports he is ok to take ASPRIN 81MG only if he takes OMEPRAZOLE with his ASPIRIN   • Augmentin Vomiting   • Clindamycin      Develop c.diff   • Metronidazole      Not sure   • Nsaids Unspecified     GI bleed   • Vancomycin Swelling   • Ace Inhibitors Cough       Medications  Prior to Admission Medications   Prescriptions Last Dose Informant Patient Reported? Taking?   Acetaminophen (TYLENOL PO) 7/6/2020 at 0900 Patient Yes Yes   Sig: Take 3 Tabs by mouth as needed (For pain). Pt is not sure the strength   amLODIPine (NORVASC) 2.5 MG Tab 7/6/2020 at 1900 Patient Yes Yes   Sig: Take 3.75 mg by mouth every evening.   aspirin EC 81 MG EC tablet 7/7/2020 at 0645 Patient No No   Sig: Take 1 Tab by mouth every day.   atorvastatin (LIPITOR) 80 MG tablet 7/6/2020 at 1900 Patient Yes Yes   Sig: Take 80 mg by mouth every evening.   ipratropium (ATROVENT) 0.06 % Solution 7/7/2020 at 0630 Patient Yes Yes   Sig: Spray 2 Sprays in nose 2 Times a Day.   losartan (COZAAR) 100 MG Tab 7/6/2020 at 1900 Patient Yes Yes   Sig: Take 100 mg by mouth every evening.   metoprolol (LOPRESSOR) 25 MG Tab 7/7/2020 at 0645 Patient No No   Sig: Take 1.5 Tabs by mouth 2 times a day.   nitroglycerin (NITROSTAT) 0.4 MG SL Tab > 5 months at Unknown Patient No No   Sig: Place 1 Tab under tongue as needed for Chest Pain. Up to 3 every five minutes   omeprazole (PRILOSEC) 20 MG delayed-release capsule 7/7/2020 at 0645 Patient  Yes Yes   Sig: Take 20 mg by mouth every day.   tadalafil (CIALIS) 20 MG tablet 7/5/2020 at Unknown Patient No No   Sig: Take 1 Tab by mouth 1 time daily as needed for Erectile Dysfunction.      Facility-Administered Medications: None       Physical Exam  Temp:  [36 °C (96.8 °F)-36.7 °C (98.1 °F)] 36.7 °C (98.1 °F)  Pulse:  [52-69] 66  Resp:  [18] 18  BP: (136-202)/(68-99) 186/85  SpO2:  [94 %-98 %] 96 %    Physical Exam  Constitutional:       General: He is not in acute distress.     Appearance: He is not ill-appearing or diaphoretic.   HENT:      Head: Atraumatic.      Right Ear: External ear normal.      Left Ear: External ear normal.      Nose: No congestion or rhinorrhea.      Mouth/Throat:      Mouth: Mucous membranes are moist.   Eyes:      General: No scleral icterus.        Right eye: No discharge.         Left eye: No discharge.      Pupils: Pupils are equal, round, and reactive to light.   Neck:      Musculoskeletal: Neck supple. No neck rigidity or muscular tenderness.   Cardiovascular:      Rate and Rhythm: Normal rate and regular rhythm.      Heart sounds: No friction rub. No gallop.    Pulmonary:      Effort: No respiratory distress.      Breath sounds: No stridor. No wheezing.   Abdominal:      Palpations: Abdomen is soft.      Tenderness: There is no abdominal tenderness. There is no guarding or rebound.   Musculoskeletal:      Right lower leg: No edema.      Left lower leg: No edema.   Skin:     Coloration: Skin is not jaundiced or pale.   Neurological:      Mental Status: He is alert and oriented to person, place, and time.      Coordination: Coordination normal.   Psychiatric:         Mood and Affect: Mood normal.         Behavior: Behavior normal.         Laboratory:  Recent Labs     07/07/20  1009   WBC 7.5   RBC 4.87   HEMOGLOBIN 15.2   HEMATOCRIT 46.7   MCV 95.9   MCH 31.2   MCHC 32.5*   RDW 46.1   PLATELETCT 179   MPV 10.2     Recent Labs     07/07/20  1009   SODIUM 139   POTASSIUM 4.2    CHLORIDE 102   CO2 25   GLUCOSE 241*   BUN 13   CREATININE 0.72   CALCIUM 9.4     Recent Labs     07/07/20  1009   ALTSGPT 22   ASTSGOT 27   ALKPHOSPHAT 80   TBILIRUBIN 0.6   GLUCOSE 241*         No results for input(s): NTPROBNP in the last 72 hours.      Recent Labs     07/07/20  1009   TROPONINT 9       Imaging:  DX-CHEST-PORTABLE (1 VIEW)   Final Result      No acute cardiac or pulmonary abnormalities are identified.        I personally reviewed patients EKG shows a sinus rate of 71, multiple ventricular premature complexes, no ST elevation, nearly 0.5-1 mm ST depression and lead I and II, QTc 439    Assessment/Plan:    * Pain in the chest  Assessment & Plan  EKG, sinus rate of 71, multiple ventricular premature complexes, no ST elevation, nearly 0.5-1 mm ST depression and lead I and II, QTc 439  Troponin 9 I will trend  Stat EKG and troponin for recurrence of chest pain.   Continuous cardiac monitoring.  Plan for stress test as long as there is no significant troponin elevation/EKG changes     Hypertensive urgency  Assessment & Plan  We will start hydralazine and labetalol as needed for extreme hypertension  Resume home amlodipine, metoprolol losartan with hold parameters    Coronary artery disease involving native coronary artery- (present on admission)  Assessment & Plan  Resume home metoprolol, statin and losartan     Dyslipidemia- (present on admission)  Assessment & Plan  Resume home high intensity statin    Hypertension- (present on admission)  Assessment & Plan  With hypertensive urgency.  Resume home amlodipine, metoprolol losartan with hold parameters    SCDs, ambulatory

## 2020-07-07 NOTE — TELEPHONE ENCOUNTER
Dalila,     Disregard this msg, advised wife of refusal reason. Advised that he is under the ER MD's care & they will fill what is appropriate for pt at this time & we cannot interfere with his current plan of care. She understands now & will contact ER RN for any needs.

## 2020-07-07 NOTE — ED NOTES
ERP at bedside. Pt agrees with plan of care discussed by ERP. AIDET acknowledged with patient. Nadir in low position, side rail up for pt safety. Call light within reach. Will continue to monitor.

## 2020-07-07 NOTE — ASSESSMENT & PLAN NOTE
EKG, sinus rate of 71, multiple ventricular premature complexes, no ST elevation, nearly 0.5-1 mm ST depression and lead I and II, QTc 439  Troponin 9 I will trend  Stat EKG and troponin for recurrence of chest pain.   Continuous cardiac monitoring.  Plan for stress test as long as there is no significant troponin elevation/EKG changes

## 2020-07-07 NOTE — PROGRESS NOTES
2 RN skin check complete  Device in place PIV, tele .  Skin assessed under devices c/d/i .  Confirmed pressure ulcers found on NA .  New potential pressure ulcers found on NA . Wound consult placed NA .  The following interventions in place pt independent with ambulation and repositioning, education provided, verbalized understanding .    Skin fragile with generalized bruising, scratch to R forearm scabbed. Otherwise skin remains intact.

## 2020-07-07 NOTE — PROGRESS NOTES
Pt arrived to 302-2 from ED. A&Ox4. Reports mild chest pain, unchanged since this morning. Hypertensive upon arrival, pharmacy notified for medication verification. Ambulated to bed from [facility] independently with steady gait. Placed on tele. Oriented to room, call bell within reach. Admit profile completed. Fall precautions/safety maintained.

## 2020-07-07 NOTE — TELEPHONE ENCOUNTER
LS    Hello patient wife calling in regards to patients medication nitroglycerin (NITROSTAT) tablet 0.4 mg. Patient is admitted at the  ER and it shows this medication was refused. If you have any questions patient can be reached at [phone]

## 2020-07-07 NOTE — CARE PLAN
Problem: Discharge Barriers/Planning  Goal: Patient's continuum of care needs will be met  Outcome: PROGRESSING AS EXPECTED  Intervention: Assess potential discharge barriers on admission and throughout hospital stay  Note: Pt updated on dietary restrictions and NPO after midnight for stress test. Verbalized understanding, all questions answered. Call bell within reach. Pt able to make needs known     Problem: Pain Management  Goal: Pain level will decrease to patient's comfort goal  Outcome: PROGRESSING AS EXPECTED  Intervention: Follow pain managment plan developed in collaboration with patient and Interdisciplinary Team  Note: Pt c/o mild chest pain, unchanged. 1/10 states he is comfortable and declines intervention at this time. Will continue to monitor.

## 2020-07-08 NOTE — PROGRESS NOTES
Patient rounded. POC discussed. Medications administered as ordered. Comfortable in bed. Appropriate safety precautions in place. Bed alarm refused. Will continue to monitor patient.

## 2020-07-08 NOTE — PROGRESS NOTES
MD at bedside, cleared pt for discharge with plan for outpatient stress test. PCP and cardiology follow up appointments made, pt to receive call to schedule stress test per hospital . Discussed discharge instructions, all questions answered. Tele and IV D/C. Belongings gathered and given to pt upon leaving the unit. Pt escorted to lobby via w/c in stable condition with CNA.

## 2020-07-08 NOTE — DISCHARGE SUMMARY
Hospital Medicine Discharge Note     Admit Date:  7/7/2020       Discharge Date:   7/8/2020    Attending Physician:  Paul Mooney M.D.     Diagnoses (includes active and resolved):   Principal Problem:    Pain in the chest POA: Unknown  Active Problems:    Hypertension (Chronic) POA: Yes    Dyslipidemia (Chronic) POA: Yes    Coronary artery disease involving native coronary artery POA: Yes    Hypertensive urgency POA: Unknown  Resolved Problems:    * No resolved hospital problems. *      Hospital Summary (Brief Narrative):       75 y.o. male with past medical history of coronary artery disease status post stent placement August 2020, hypertension, dyslipidemia who presented 7/7/2020 with chest pain while exercising on his treadmill.   He was admitted for chest pain ruleout. The patient was monitored on telemetry. Troponins were tracked and were unremarkable and not consistent w/ myocardial infarction.  A stress test was unable to be performed on a timely fashion as the patient had recently taken Cialis at home.  His potassium and magnesium were repleted.  He preferred to go home and get an outpatient MPI rather than wait for while inpatient.  An appointment was scheduled for him to see his cardiologist outpatient as well as MPI.  He currently continues to have a twinge of discomfort in his chest but it does not radiate anywhere including his neck or his back.  There is no pressure-like sensation or diaphoresis.  He feels good otherwise and was able to be discharged home.        Consultants:      None    Procedures:        None    Discharge Medications:           Medication List      CONTINUE taking these medications      Instructions   amLODIPine 2.5 MG Tabs  Commonly known as:  NORVASC   Take 7.5 mg by mouth every evening.  Dose:  7.5 mg     aspirin 81 MG EC tablet   Take 1 Tab by mouth every day.  Dose:  81 mg     atorvastatin 80 MG tablet  Commonly known as:  LIPITOR   Take 80 mg by mouth every evening.  Dose:  80  mg     ipratropium 0.06 % Soln  Commonly known as:  ATROVENT   Spray 2 Sprays in nose 2 Times a Day.  Dose:  2 Spray     losartan 100 MG Tabs  Commonly known as:  COZAAR   Take 100 mg by mouth every evening.  Dose:  100 mg     metoprolol 25 MG Tabs  Commonly known as:  LOPRESSOR   Doctor's comments:  Do not fill until pt requests he has prior rx at home already  Take 1.5 Tabs by mouth 2 times a day.  Dose:  37.5 mg     nitroglycerin 0.4 MG Subl  Commonly known as:  NITROSTAT   Place 1 Tab under tongue as needed for Chest Pain. Up to 3 every five minutes  Dose:  0.4 mg     omeprazole 20 MG delayed-release capsule  Commonly known as:  PRILOSEC   Take 20 mg by mouth every day.  Dose:  20 mg     tadalafil 20 MG tablet  Commonly known as:  Cialis   Take 1 Tab by mouth 1 time daily as needed for Erectile Dysfunction.  Dose:  20 mg     TYLENOL PO   Take 3 Tabs by mouth as needed (For pain). Pt is not sure the strength  Dose:  3 Tab          Disposition:   Discharge home    Activity:   As tolerated    Code status:   Full code    Primary Care Provider:    Reid Wills M.D.    Follow up appointment details :      Reid Wills M.D.  [address]  [address]  [facility] [address]  [phone]          Cardiology      outpatient stress test    Future Appointments   Date Time Provider Department Center   7/17/2020 12:40 PM Reid Wills M.D. [facility]   8/12/2020  7:40 AM TONY Tuttle [facility] None       Pending Studies:        Outpatient MPI    #################################################    Interval history/exam for day of discharge:    Vitals:    07/07/20 1907 07/08/20 0000 07/08/20 0500 07/08/20 0734   BP: (!) 167/77 155/74 150/77 149/76   Pulse: 66 67 61 (!) 57   Resp: 18 18 18 18   Temp: 36.8 °C (98.3 °F) 36.9 °C (98.5 °F) 36.8 °C (98.3 °F) 36.5 °C (97.7 °F)   TempSrc: Oral Oral Oral Oral   SpO2: 93% 92% 93% 94%   Weight:   92 kg (202 lb 13.2 oz)    Height:         Weight/BMI: Body mass index is  26.76 kg/m².  Pulse Oximetry: 94 %, O2 (LPM): 0, O2 Delivery Device: None - Room Air    General:  NAD  CVS:  RRR  PULM:  CTAB, no respiratory distress    Most Recent Labs:    Lab Results   Component Value Date/Time    WBC 8.1 07/08/2020 04:17 AM    RBC 4.47 (L) 07/08/2020 04:17 AM    HEMOGLOBIN 14.0 07/08/2020 04:17 AM    HEMATOCRIT 42.6 07/08/2020 04:17 AM    MCV 95.3 07/08/2020 04:17 AM    MCH 31.3 07/08/2020 04:17 AM    MCHC 32.9 (L) 07/08/2020 04:17 AM    MPV 10.8 07/08/2020 04:17 AM    NEUTSPOLYS 55.30 07/08/2020 04:17 AM    LYMPHOCYTES 30.40 07/08/2020 04:17 AM    MONOCYTES 10.40 07/08/2020 04:17 AM    EOSINOPHILS 3.20 07/08/2020 04:17 AM    BASOPHILS 0.60 07/08/2020 04:17 AM      Lab Results   Component Value Date/Time    SODIUM 139 07/08/2020 04:17 AM    POTASSIUM 3.4 (L) 07/08/2020 04:17 AM    CHLORIDE 104 07/08/2020 04:17 AM    CO2 23 07/08/2020 04:17 AM    GLUCOSE 133 (H) 07/08/2020 04:17 AM    BUN 12 07/08/2020 04:17 AM    CREATININE 0.49 (L) 07/08/2020 04:17 AM    CREATININE 1.0 01/12/2009 08:55 AM      Lab Results   Component Value Date/Time    ALTSGPT 17 07/08/2020 04:17 AM    ASTSGOT 18 07/08/2020 04:17 AM    ALKPHOSPHAT 65 07/08/2020 04:17 AM    TBILIRUBIN 0.8 07/08/2020 04:17 AM    LIPASE 23 10/12/2018 11:06 AM    ALBUMIN 3.7 07/08/2020 04:17 AM    ALBUMIN 4.38 02/28/2017 07:05 AM    GLOBULIN 2.3 07/08/2020 04:17 AM    INR 1.17 (H) 10/12/2018 11:06 AM     Lab Results   Component Value Date/Time    PROTHROMBTM 14.8 (H) 10/12/2018 11:06 AM    INR 1.17 (H) 10/12/2018 11:06 AM        Imaging/ Testing:      DX-CHEST-PORTABLE (1 VIEW)   Final Result      No acute cardiac or pulmonary abnormalities are identified.      NM-CARDIAC STRESS TEST    (Results Pending)       Instructions:      The patient was instructed to return to the ER in the event of worsening symptoms. I have counseled the patient on the importance of compliance and the patient has agreed to proceed with all medical recommendations and  follow up plan indicated above.   The patient understands that all medications come with benefits and risks. Risks may include permanent injury or death and these risks can be minimized with close reassessment and monitoring.

## 2020-07-08 NOTE — PROGRESS NOTES
Information sheet from CATHERINE provided to patient and spouse regarding NM-CARDIAC STRESS TEST. Encouraged to voice feelings and to ask questions. Answered all questions and concerns. Agrees with the plan of care.

## 2020-07-08 NOTE — CARE PLAN
Problem: Venous Thromboembolism (VTW)/Deep Vein Thrombosis (DVT) Prevention:  Goal: Patient will participate in Venous Thrombosis (VTE)/Deep Vein Thrombosis (DVT)Prevention Measures  Outcome: PROGRESSING AS EXPECTED  Intervention: Ensure patient wears graduated elastic stockings (MARLENE hose) and/or SCDs, if ordered, when in bed or chair (Remove at least once per shift for skin check)  Flowsheets (Taken 7/8/2020 0800)  SCDs, Sequential Compression Device: Refused  Note: Refused SCDs despite education. Pt ambulatory. Remains free s/s DVT/VTE     Problem: Discharge Barriers/Planning  Goal: Patient's continuum of care needs will be met  Outcome: PROGRESSING AS EXPECTED  Intervention: Assess potential discharge barriers on admission and throughout hospital stay  Note: Awaiting stress test, pt took cialis 7/5 approximately 1300, per nuc med RN possible stress test this afternoon. Pt updated. Verbalized understanding.

## 2020-07-08 NOTE — PROGRESS NOTES
Bedside report given to KATH Sin. POC discussed, all questions answered. Safety precautions in place. Care released.

## 2020-07-08 NOTE — PROGRESS NOTES
Telemetry Shift Summary     Rhythm SR  HR Range 60 - 78  Ectopy F PVC; R TRIGEM  Measurements 0.16 / 0.10 / 0.40           Normal Values  Rhythm SR  HR Range    Measurements 0.12-0.20 / 0.06-0.10  / 0.30-0.52

## 2020-07-08 NOTE — DISCHARGE INSTRUCTIONS
Discharge Instructions per Paul Mooney M.D.    Do an outpatient stress test and follow-up with your cardiologist outpatient.    Return to ER if severe chest pain or difficulty breathing      Chest Pain, Nonspecific  It is often hard to give a specific diagnosis for the cause of chest pain. There is always a chance that your pain could be related to something serious, like a heart attack or a blood clot in the lungs. You need to follow up with your caregiver for further evaluation. More lab tests or other studies such as X-rays, electrocardiography, stress testing, or cardiac imaging may be needed to find the cause of your pain.  Most of the time, nonspecific chest pain improves within 2 to 3 days with rest and mild pain medicine. For the next few days, avoid physical exertion or activities that bring on pain. Do not smoke. Avoid drinking alcohol. Call your caregiver for routine follow-up as advised.   SEEK IMMEDIATE MEDICAL CARE IF:  · You develop increased chest pain or pain that radiates to the arm, neck, jaw, back, or abdomen.   · You develop shortness of breath, increased coughing, or you start coughing up blood.   · You have severe back or abdominal pain, nausea, or vomiting.   · You develop severe weakness, fainting, fever, or chills.   Document Released: 12/18/2006 Document Revised: 03/11/2013 Document Reviewed: 06/06/2008  [employer]® Patient Information ©2013 "[employer].      Discharge Instructions    Discharged to home by car with relative. Discharged via wheelchair, hospital escort: Yes.  Special equipment needed: Not Applicable    Be sure to schedule a follow-up appointment with your primary care doctor or any specialists as instructed.     Discharge Plan:   Diet Plan: Discussed  Activity Level: Discussed  Confirmed Follow up Appointment: Patient to Call and Schedule Appointment  Confirmed Symptoms Management: Discussed  Medication Reconciliation Updated: Yes    I understand that a diet low in  cholesterol, fat, and sodium is recommended for good health. Unless I have been given specific instructions below for another diet, I accept this instruction as my diet prescription.   Other diet: cardiac    Special Instructions: None    · Is patient discharged on Warfarin / Coumadin?   No     Depression / Suicide Risk    As you are discharged from this [facility] facility, it is important to learn how to keep safe from harming yourself.    Recognize the warning signs:  · Abrupt changes in personality, positive or negative- including increase in energy   · Giving away possessions  · Change in eating patterns- significant weight changes-  positive or negative  · Change in sleeping patterns- unable to sleep or sleeping all the time   · Unwillingness or inability to communicate  · Depression  · Unusual sadness, discouragement and loneliness  · Talk of wanting to die  · Neglect of personal appearance   · Rebelliousness- reckless behavior  · Withdrawal from people/activities they love  · Confusion- inability to concentrate     If you or a loved one observes any of these behaviors or has concerns about self-harm, here's what you can do:  · Talk about it- your feelings and reasons for harming yourself  · Remove any means that you might use to hurt yourself (examples: pills, rope, extension cords, firearm)  · Get professional help from the community (Mental Health, Substance Abuse, psychological counseling)  · Do not be alone:Call your Safe Contact- someone whom you trust who will be there for you.  · Call your local CRISIS HOTLINE 219-8001 or 768-479-9584  · Call your local Children's Mobile Crisis Response Team [place] (083) 682-3454 or www.[employer]rivial[employer]  · Call the toll free National Suicide Prevention Hotlines   · National Suicide Prevention Lifeline 316-381-FOBB (0745)  · National Hope Line Network 800-SUICIDE (139-0414)

## 2020-07-08 NOTE — PROGRESS NOTES
Telemetry Shift Summary    Rhythm SR  HR Range 80s-90s  Ectopy F-PVC, O-trig, R-coup, R-trip  Measurements 0.16/0.10/0.40        Normal Values  Rhythm SR  HR Range    Measurements 0.12-0.20 / 0.06-0.10  / 0.30-0.52

## 2020-07-08 NOTE — PROGRESS NOTES
Assumed care of pt w/ bedside report from KATH Sin. Pt resting comfortably in bed, no complaints offered. NPO for stress test. Fall precautions/safety maintained. Hourly rounding. Will continue to monitor.

## 2020-07-08 NOTE — PROGRESS NOTES
Amlodipine dose confirmed with both pt and [facility]. MD Vazquez made aware and order updated. MD also made aware of troponin result 11.

## 2020-07-08 NOTE — PROGRESS NOTES
"Pt scheduled for stress test this AM. Per med rec pt took Cialis on 7/5 but did not state time. This nurse spoke with patient in room and pt stated it was \"around 1300\". Spoke with Nuc Med Tech. Need to wait 72 hours after administration d/t potential extended release effect of medication for stress test and as we do not have an exact time we will hold stress test today. Information given to primary RN, Sil.   "

## 2020-07-08 NOTE — PROGRESS NOTES
Received bedside patient report from KATH Mujica. Patient resting comfortably in bed, no complaints at this time. Safety precautions in place. Will continue to monitor.

## 2020-07-17 NOTE — PROCEDURES
Nursing care plan includes knowledge deficit, potential for discomfort, potential for compromised cardiac output. POC includes teaching, comfort measures and reassurance, and access to code cart, cardiology stand by and availability of rapid response team. Pt verbalizes good understanding of benefits and risks of pharmacological cardiac stress test. Informed consent obtained. Lexiscan given, pt denied cardiac symptoms. VS stable. Baseline EKG shows sinus bradycardia with  HTN and occasional PVC's that persisted throughout exam. Nursing goals met.

## 2020-07-23 NOTE — ASSESSMENT & PLAN NOTE
Was having left sided chest pain with given MI history he went to ER. Troponins negative, had out patient stress test, which was unchanged - no new concerns.

## 2020-07-23 NOTE — ASSESSMENT & PLAN NOTE
Patient had chest pain that resulted in him going to the emergency department.  Troponins were negative so he was discharged for outpatient stress test, which showed no changes.  He denies any recurrent chest pain.

## 2020-07-23 NOTE — PROGRESS NOTES
Subjective:   Vinod Conrad is a 75 y.o. male here today for chest pain    Coronary artery disease involving native coronary artery  Was having left sided chest pain with given MI history he went to ER. Troponins negative, had out patient stress test, which was unchanged - no new concerns.    Diabetes mellitus type 2, controlled ([facility])  Patient has not had A1c in 1 year, at that time A1c was 6.3. He is currently not on diabetic medications and is diet controlled.    Pain in the chest  Patient had chest pain that resulted in him going to the emergency department.  Troponins were negative so he was discharged for outpatient stress test, which showed no changes.  He denies any recurrent chest pain.         Current medicines (including changes today)  Current Outpatient Medications   Medication Sig Dispense Refill   • Acetaminophen (TYLENOL PO) Take 3 Tabs by mouth as needed (For pain). Pt is not sure the strength     • amLODIPine (NORVASC) 2.5 MG Tab Take 7.5 mg by mouth every evening.     • atorvastatin (LIPITOR) 80 MG tablet Take 80 mg by mouth every evening.     • ipratropium (ATROVENT) 0.06 % Solution Spray 2 Sprays in nose 2 Times a Day.     • losartan (COZAAR) 100 MG Tab Take 100 mg by mouth every evening.     • omeprazole (PRILOSEC) 20 MG delayed-release capsule Take 20 mg by mouth every day.     • tadalafil (CIALIS) 20 MG tablet Take 1 Tab by mouth 1 time daily as needed for Erectile Dysfunction. 20 Tab 5   • metoprolol (LOPRESSOR) 25 MG Tab Take 1.5 Tabs by mouth 2 times a day. 270 Tab 3   • nitroglycerin (NITROSTAT) 0.4 MG SL Tab Place 1 Tab under tongue as needed for Chest Pain. Up to 3 every five minutes 25 Tab 11   • aspirin EC 81 MG EC tablet Take 1 Tab by mouth every day. 30 Tab 3     No current facility-administered medications for this visit.      He  has a past medical history of Allergy, Anesthesia (2006), Arthritis, CAD (coronary artery disease) (08/15/2018), CATARACT, Hyperlipidemia,  "Hypertension, Infectious disease (2011), Pain, and Unspecified hemorrhagic conditions (2006). He also has no past medical history of ASTHMA.    ROS   No chest pain, no shortness of breath       Objective:     /70   Pulse (!) 56   Temp 36.5 °C (97.7 °F) (Temporal)   Ht 1.854 m (6' 1\")   Wt 91.2 kg (201 lb)   SpO2 94%  Body mass index is 26.52 kg/m².   Physical Exam:  Constitutional: Alert, no distress.  Skin: Warm, dry, good turgor, no rashes in visible areas.  Eye: Equal, round and reactive, conjunctiva clear, lids normal.  Psych: Alert and oriented x3, normal affect and mood.        Assessment and Plan:   The following treatment plan was discussed    1. Coronary artery disease involving native coronary artery of native heart without angina pectoris  Check echocardiogram to evaluate ejection fraction, which was 45% 2 years ago.  Call with results.  Continue current medications.  - EC-ECHOCARDIOGRAM COMPLETE W/O CONT; Future    2. Chest pain, unspecified type  Check echocardiogram to evaluate ejection fraction, which was 45% 2 years ago.  Call with results.  - EC-ECHOCARDIOGRAM COMPLETE W/O CONT; Future    3. Controlled type 2 diabetes mellitus without complication, without long-term current use of insulin ([facility])  Check labs and message with results.  - CBC WITH DIFFERENTIAL; Future  - Comp Metabolic Panel; Future  - Lipid Profile; Future  - HEMOGLOBIN A1C; Future  - MICROALBUMIN CREAT RATIO URINE; Future  - TSH WITH REFLEX TO FT4; Future    4. Prostate cancer screening  - PROSTATE SPECIFIC AG SCREENING; Future      Followup: Return if symptoms worsen or fail to improve.         "

## 2020-07-23 NOTE — ASSESSMENT & PLAN NOTE
Patient has not had A1c in 1 year, at that time A1c was 6.3. He is currently not on diabetic medications and is diet controlled.

## 2020-08-04 NOTE — PROGRESS NOTES
Established Patient    Vinod presents today with the following:    CC: skin rash    HPI:   75 y.o. male came in for above.    He noticed skin rash popping up on his left forearm, arm and right lower neck. It started a week ago and keep appearing serially. They are slightly red and neck rash is itchy. There have been bugs in his bath tub this summer.   His wife also had bug bites and went to ER with an inflamed rash which improved with corticosteroids.     ROS:   No fever, chills.    Patient Active Problem List    Diagnosis Date Noted   • Pain in the chest 07/07/2020     Priority: High   • Hypertensive urgency 07/07/2020     Priority: High   • Coronary artery disease involving native coronary artery 08/17/2018     Priority: High   • Hypertension 09/12/2009     Priority: High   • Dyslipidemia 09/12/2009     Priority: High   • History of MI (myocardial infarction) 08/22/2018   • S/P angioplasty with stent 08/17/2018   • Hearing loss of aging 09/08/2015   • Erectile dysfunction 09/08/2014   • Low back pain 09/25/2013   • History of cervical spinal surgery 05/13/2013   • Arthritis 11/13/2012   • History of Clostridium difficile infection 11/17/2011   • Diabetes mellitus type 2, controlled (HCC) 09/12/2009   • History of gastrointestinal bleeding 09/12/2009   • S/P knee surgery 09/12/2009   • Preventative health care 09/12/2009   • Retinal detachment 09/12/2009       Current Outpatient Medications   Medication Sig Dispense Refill   • Acetaminophen (TYLENOL PO) Take 3 Tabs by mouth as needed (For pain). Pt is not sure the strength     • amLODIPine (NORVASC) 2.5 MG Tab Take 7.5 mg by mouth every evening.     • atorvastatin (LIPITOR) 80 MG tablet Take 80 mg by mouth every evening.     • ipratropium (ATROVENT) 0.06 % Solution Spray 2 Sprays in nose 2 Times a Day.     • losartan (COZAAR) 100 MG Tab Take 100 mg by mouth every evening.     • omeprazole (PRILOSEC) 20 MG delayed-release capsule Take 20 mg by mouth every day.      • tadalafil (CIALIS) 20 MG tablet Take 1 Tab by mouth 1 time daily as needed for Erectile Dysfunction. 20 Tab 5   • metoprolol (LOPRESSOR) 25 MG Tab Take 1.5 Tabs by mouth 2 times a day. 270 Tab 3   • nitroglycerin (NITROSTAT) 0.4 MG SL Tab Place 1 Tab under tongue as needed for Chest Pain. Up to 3 every five minutes 25 Tab 11   • aspirin EC 81 MG EC tablet Take 1 Tab by mouth every day. 30 Tab 3     No current facility-administered medications for this visit.          /80 (BP Location: Right arm, Patient Position: Sitting, BP Cuff Size: Adult)   Pulse 78   Temp 36.7 °C (98.1 °F) (Temporal)   Resp 20   Wt 90.4 kg (199 lb 6.4 oz)   SpO2 93%   BMI 26.31 kg/m²     Physical Exam  General: Alert and oriented, No apparent distress.   Skin: circular confluant rashes with mild erythema and central bite marks, 0.5 to 1 cm. No discharge or pus.       Assessment and Plan    1. Skin rash  Likely local reaction from insect bite. No s/s of infection.   - recommended calamine lotion and claritin for itchiness. Do not recommend corticosteroid for mild rash.  - he is getting inset treatment soon and hopefully this won't recur.  - come back to clinic if rash is spreading and more inflamed.        Signed by: Meme Spicer M.D.

## 2020-08-11 PROBLEM — I16.0 HYPERTENSIVE URGENCY: Status: RESOLVED | Noted: 2020-01-01 | Resolved: 2020-01-01

## 2020-08-11 NOTE — PROGRESS NOTES
"Chief Complaint   Patient presents with   • Hospital Follow-up   • Chest Pain   • Coronary Artery Disease   • HTN (Controlled)   • Hyperlipidemia       Subjective:   Vinod Conrad is a 75 y.o. male who presents today for hospital follow-up of chest pain.    Vinod is a 75 year old male with history of CAD, status post PCI/ESTEFANY x 2 to the LAD in 2018, hypertension and hyperlipidemia, normally followed by Dr. Jacobo, last seen in February 2020.    On 7/7/2020, he presented to [facility] with chest pain; EKG and troponins were normal; he was set up to have an outpatient stress test, which came back with small, fixed anterior apical perfusion defect consistent with prior infarct versus possible artifact. He is scheduled to have echocardiogram next Monday.     He is here today for follow-up.He is feeling much better, with no further chest pain. BP is stable, running 120-130 systolic at home. He feels well: no chest pressure or discomfort; no palpitations; no shortness of breath, orthopnea or PND; no dizziness or syncope; he does notice some very mild, stable LE edema.    Past Medical History:   Diagnosis Date   • Allergy    • Anesthesia 2006    Hallucinations post anesthesia, at home   • Arthritis     all joints   • CAD (coronary artery disease) 08/15/2018    PCI/ESTEFANY x 2 to the proximal mid LAD (Xience 3.0 x 38mm, 3.0 x 8mm).   • CATARACT     bilateral IOL   • Hyperlipidemia    • Hypertension    • Infectious disease 2011    C. Difficile   • Pain    • Unspecified hemorrhagic conditions 2006    \"abd bleeding\"     Past Surgical History:   Procedure Laterality Date   • GASTROSCOPY N/A 8/23/2018    Procedure: GASTROSCOPY;  Surgeon: Amari Juárez M.D.;  Location: SURGERY [facility];  Service: Gastroenterology   • CERVICAL FORAMINOTOMY  5/20/2013    Performed by Krish Yang M.D. at [facility]   • OTHER  2010    torn retina RIGHT EYE   • SCLERAL BUCKLING  1/13/2009    Performed by " YVETTE DRAPER at SURGERY SAME DAY [facility]   • OTHER ORTHOPEDIC SURGERY  2005    l knee     Family History   Problem Relation Age of Onset   • Heart Failure Mother 78         at 78   • Heart Disease Brother 70        3 stents     Social History     Socioeconomic History   • Marital status:      Spouse name: Not on file   • Number of children: Not on file   • Years of education: Not on file   • Highest education level: Not on file   Occupational History   • Not on file   Social Needs   • Financial resource strain: Not on file   • Food insecurity     Worry: Not on file     Inability: Not on file   • Transportation needs     Medical: Not on file     Non-medical: Not on file   Tobacco Use   • Smoking status: Former Smoker     Packs/day: 2.00     Years: 30.00     Pack years: 60.00     Types: Cigarettes     Quit date: 1998     Years since quittin.6   • Smokeless tobacco: Never Used   Substance and Sexual Activity   • Alcohol use: Yes     Alcohol/week: 0.0 oz     Comment: 5 per week   • Drug use: No   • Sexual activity: Not on file   Lifestyle   • Physical activity     Days per week: Not on file     Minutes per session: Not on file   • Stress: Not on file   Relationships   • Social connections     Talks on phone: Not on file     Gets together: Not on file     Attends Hoahaoism service: Not on file     Active member of club or organization: Not on file     Attends meetings of clubs or organizations: Not on file     Relationship status: Not on file   • Intimate partner violence     Fear of current or ex partner: Not on file     Emotionally abused: Not on file     Physically abused: Not on file     Forced sexual activity: Not on file   Other Topics Concern   • Not on file   Social History Narrative   • Not on file     Allergies   Allergen Reactions   • Aspirin      INTERNAL BLEEDING, 325 mg, pt reports he is ok to take ASPRIN 81MG only if he takes OMEPRAZOLE with his ASPIRIN   • Augmentin Vomiting    • Clindamycin      Develop c.diff   • Metronidazole      Not sure   • Nsaids Unspecified     GI bleed   • Vancomycin Swelling   • Ace Inhibitors Cough     Outpatient Encounter Medications as of 8/11/2020   Medication Sig Dispense Refill   • Acetaminophen (TYLENOL PO) Take 3 Tabs by mouth as needed (For pain). Pt is not sure the strength     • amLODIPine (NORVASC) 2.5 MG Tab Take 7.5 mg by mouth every day.     • atorvastatin (LIPITOR) 80 MG tablet Take 80 mg by mouth every evening.     • ipratropium (ATROVENT) 0.06 % Solution Spray 2 Sprays in nose 2 Times a Day.     • losartan (COZAAR) 100 MG Tab Take 100 mg by mouth every evening.     • omeprazole (PRILOSEC) 20 MG delayed-release capsule Take 20 mg by mouth every day.     • tadalafil (CIALIS) 20 MG tablet Take 1 Tab by mouth 1 time daily as needed for Erectile Dysfunction. 20 Tab 5   • metoprolol (LOPRESSOR) 25 MG Tab Take 1.5 Tabs by mouth 2 times a day. 270 Tab 3   • aspirin EC 81 MG EC tablet Take 1 Tab by mouth every day. 30 Tab 3   • nitroglycerin (NITROSTAT) 0.4 MG SL Tab Place 1 Tab under tongue as needed for Chest Pain. Up to 3 every five minutes (Patient not taking: Reported on 8/11/2020) 25 Tab 11     No facility-administered encounter medications on file as of 8/11/2020.      Review of Systems   Constitutional: Negative for chills and fever.   HENT: Negative for congestion.    Respiratory: Negative for cough and shortness of breath.    Cardiovascular: Positive for leg swelling. Negative for chest pain, palpitations, orthopnea and PND.        Stable.   Gastrointestinal: Negative for abdominal pain and nausea.   Musculoskeletal: Negative for myalgias.   Skin: Negative for rash.   Neurological: Negative for dizziness, loss of consciousness and headaches.   Endo/Heme/Allergies: Does not bruise/bleed easily.   Psychiatric/Behavioral: The patient does not have insomnia.         Objective:   /68 (BP Location: Left arm, Patient Position: Sitting, BP Cuff  "Size: Adult)   Pulse 62   Ht 1.854 m (6' 1\")   Wt 90.7 kg (200 lb)   SpO2 96%   BMI 26.39 kg/m²     Physical Exam   Constitutional: He is oriented to person, place, and time. He appears well-developed and well-nourished.   HENT:   Head: Normocephalic.   Eyes: EOM are normal.   Neck: Normal range of motion. Neck supple. No JVD present.   Cardiovascular: Normal rate, regular rhythm and normal heart sounds.   Pulmonary/Chest: Effort normal and breath sounds normal. No respiratory distress. He has no wheezes. He has no rales.   Abdominal: Soft. Bowel sounds are normal. He exhibits no distension. There is no abdominal tenderness.   Musculoskeletal: Normal range of motion.         General: Edema present.      Comments: Trace edema to the ankles bilaterally.   Neurological: He is alert and oriented to person, place, and time.   Skin: Skin is warm and dry. No rash noted.   Psychiatric: He has a normal mood and affect.     NUCLEAR IMAGING INTERPRETATION OF MPI OF 7/17/2020:   Small, fixed anterior apical perfusion defect c/w prior infarct vs artifact,    no ischemia.   Consider echo for clinical correlation of wall motion abnormality.   SSS 2, SRS 2, SDS 0.    Normal left ventricular wall motion.  LV ejection fraction = 67%.   ECG INTERPRETATION   Nondiagnostic ecg portion of a Regadenoson nuclear stress test.    Lab Results   Component Value Date/Time    CHOLSTRLTOT 124 07/11/2019 07:45 AM    LDL 53 07/11/2019 07:45 AM    HDL 49 07/11/2019 07:45 AM    TRIGLYCERIDE 108 07/11/2019 07:45 AM       Lab Results   Component Value Date/Time    SODIUM 139 07/08/2020 04:17 AM    POTASSIUM 3.4 (L) 07/08/2020 04:17 AM    CHLORIDE 104 07/08/2020 04:17 AM    CO2 23 07/08/2020 04:17 AM    GLUCOSE 133 (H) 07/08/2020 04:17 AM    BUN 12 07/08/2020 04:17 AM    CREATININE 0.49 (L) 07/08/2020 04:17 AM    CREATININE 1.0 01/12/2009 08:55 AM     Lab Results   Component Value Date/Time    ALKPHOSPHAT 65 07/08/2020 04:17 AM    ASTSGOT 18 " 07/08/2020 04:17 AM    ALTSGPT 17 07/08/2020 04:17 AM    TBILIRUBIN 0.8 07/08/2020 04:17 AM      Assessment:     1. Chest pain, unspecified type     2. Coronary artery disease involving native coronary artery of native heart without angina pectoris     3. Essential hypertension     4. Dyslipidemia     5. Controlled type 2 diabetes mellitus without complication, without long-term current use of insulin (HCC)         Medical Decision Making:  Today's Assessment / Status / Plan:     1. Recent hospitalization for chest pain, with history of CAD, status post PCI/ESTEFANY x 2 to the LAD in 2018. Recent MPI appears to show previous infarct, but should go ahead with echocardiogram. Continue ASA, BB, ARB and statin.    2. Hypertension, treated and stable. Monitor BP at home.    3. Hyperlipidemia, treated with Lipitor 80mg.    4. Diabetes mellitus, treated with diet and exercise only. To monitor glucose over time.    Same medications for now. Echocardiogram next Monday. Will determine follow-up after that.    Collaborating MD: Mk

## 2020-08-17 NOTE — TELEPHONE ENCOUNTER
----- Message from Reid Wills M.D. sent at 8/17/2020  8:19 AM PDT -----  Please notify patient that blood sugar is still controlled with an A1c of 6.5.  Kidney function, liver function, thyroid function are normal.  Blood counts and cholesterol are also normal.  We should recheck labs in 6 months.  Reid Wills M.D.

## 2020-08-18 NOTE — TELEPHONE ENCOUNTER
----- Message from Reid Wills M.D. sent at 8/18/2020 12:35 PM PDT -----  Please notify patient that his heart function has improved from 45% to now 65%, which is a normal range. Continue current medications.  Reid Wills M.D.

## 2020-09-01 NOTE — TELEPHONE ENCOUNTER
AB/celso    Pt calling to report [facility] [address] does not have atorvastatin renewal.  Pt is unsure if the metoprolol which appears to be approved, actually has been.  [facility] and the meds were not renewed at that time. He is asking you to look into ths and call him with your findings.  Pt called our pharmacy line yesterday at 8am, left a message and has no response yet.    Please call Vinod

## 2020-09-01 NOTE — TELEPHONE ENCOUNTER
Called pt. To advise that Atorvastatin 80mg and Lopressor 37.5mg should be available for  at pharmacy.

## 2020-10-20 NOTE — PROGRESS NOTES
"Vinod Conrad is a 75 y.o. male here for a non-provider visit for:   FLU    Reason for immunization: Annual Flu Vaccine  Immunization records indicate need for vaccine: Yes, confirmed with Epic  Minimum interval has been met for this vaccine: Yes  ABN completed: Not Indicated    Order and dose verified by: Aed  VIS Dated  8-5-2019 was given to patient: Yes  All IAC Questionnaire questions were answered \"No.\"    Patient tolerated injection and no adverse effects were observed or reported: Yes    Pt scheduled for next dose in series: Not Indicated    "

## 2020-11-24 ENCOUNTER — APPOINTMENT (RX ONLY)
Dept: URBAN - METROPOLITAN AREA CLINIC 4 | Facility: CLINIC | Age: 75
Setting detail: DERMATOLOGY
End: 2020-11-24

## 2020-11-24 DIAGNOSIS — L82.1 OTHER SEBORRHEIC KERATOSIS: ICD-10-CM

## 2020-11-24 DIAGNOSIS — Z85.828 PERSONAL HISTORY OF OTHER MALIGNANT NEOPLASM OF SKIN: ICD-10-CM

## 2020-11-24 DIAGNOSIS — L81.4 OTHER MELANIN HYPERPIGMENTATION: ICD-10-CM

## 2020-11-24 DIAGNOSIS — L72.8 OTHER FOLLICULAR CYSTS OF THE SKIN AND SUBCUTANEOUS TISSUE: ICD-10-CM

## 2020-11-24 DIAGNOSIS — D22 MELANOCYTIC NEVI: ICD-10-CM

## 2020-11-24 PROBLEM — D22.5 MELANOCYTIC NEVI OF TRUNK: Status: ACTIVE | Noted: 2020-11-24

## 2020-11-24 PROCEDURE — ? ADDITIONAL NOTES

## 2020-11-24 PROCEDURE — 99213 OFFICE O/P EST LOW 20 MIN: CPT | Mod: 25

## 2020-11-24 PROCEDURE — ? INTRALESIONAL KENALOG

## 2020-11-24 PROCEDURE — 11900 INJECT SKIN LESIONS </W 7: CPT

## 2020-11-24 ASSESSMENT — LOCATION SIMPLE DESCRIPTION DERM
LOCATION SIMPLE: LEFT CHEEK
LOCATION SIMPLE: LEFT LIP
LOCATION SIMPLE: RIGHT FOREARM
LOCATION SIMPLE: LEFT FOREARM
LOCATION SIMPLE: CHEST
LOCATION SIMPLE: LOWER BACK
LOCATION SIMPLE: RIGHT LOWER BACK

## 2020-11-24 ASSESSMENT — LOCATION DETAILED DESCRIPTION DERM
LOCATION DETAILED: LEFT LOWER CUTANEOUS LIP
LOCATION DETAILED: RIGHT PROXIMAL DORSAL FOREARM
LOCATION DETAILED: SUPERIOR LUMBAR SPINE
LOCATION DETAILED: LEFT MEDIAL SUPERIOR CHEST
LOCATION DETAILED: LEFT DISTAL DORSAL FOREARM
LOCATION DETAILED: LEFT INFERIOR CENTRAL MALAR CHEEK
LOCATION DETAILED: RIGHT INFERIOR MEDIAL MIDBACK
LOCATION DETAILED: LEFT CENTRAL MALAR CHEEK

## 2020-11-24 ASSESSMENT — LOCATION ZONE DERM
LOCATION ZONE: FACE
LOCATION ZONE: ARM
LOCATION ZONE: TRUNK
LOCATION ZONE: LIP

## 2020-11-24 NOTE — PROCEDURE: INTRALESIONAL KENALOG
Consent: The risks of atrophy were reviewed with the patient.
Lot # For Kenalog (Optional): [id number]
Medical Necessity Clause: This procedure was medically necessary because the lesions that were treated were:
Total Volume (Ccs): 0.3
Treatment Number (Optional): 1
Include Z78.9 (Other Specified Conditions Influencing Health Status) As An Associated Diagnosis?: No
Administered By (Optional): Tolu Nix MD
Kenalog Preparation: Kenalog with 1% lidocaine without epinephrine
Expiration Date For Kenalog (Optional): 3/2022
Concentration Of Kenalog Solution Injected (Mg/Ml): 3.0
Detail Level: Detailed
X Size Of Lesion In Cm (Optional): 0

## 2020-11-24 NOTE — PROCEDURE: ADDITIONAL NOTES
Detail Level: Detailed
Additional Notes: Advised that if this doesn’t resolve then this may need to be removed by a punch excision.

## 2020-11-30 PROBLEM — R21 RASH OF BODY: Status: ACTIVE | Noted: 2020-01-01

## 2020-11-30 NOTE — ASSESSMENT & PLAN NOTE
Started 3 weeks ago after starting vitamin D. Rash broke out on his body and there is mild itching. Skin is dry in hands. No new detergents.

## 2020-11-30 NOTE — ASSESSMENT & PLAN NOTE
Patient is tolerating losartan 100 mg daily, amlodipine 7.5 mg daily, metoprolol 37.5 mg twice daily. Blood pressure average has been 126/68.

## 2020-11-30 NOTE — PROGRESS NOTES
Subjective:   Vinod Conrad is a 75 y.o. male here today for rash, hypertension    Rash of body  Started 3 weeks ago after starting vitamin D. Rash broke out on his body and there is mild itching. Skin is dry in hands. No new detergents.    Hypertension  Patient is tolerating losartan 100 mg daily, amlodipine 7.5 mg daily, metoprolol 37.5 mg twice daily. Blood pressure average has been 126/68.    Erectile dysfunction  He is requesting a refill of Cialis. He is not taking nitroglycerin.         Current medicines (including changes today)  Current Outpatient Medications   Medication Sig Dispense Refill   • tadalafil (CIALIS) 20 MG tablet Take 1 Tab by mouth 1 time daily as needed for Erectile Dysfunction. 20 Tab 5   • ipratropium (ATROVENT) 0.06 % Solution Spray 2 Sprays in nose 2 Times a Day. 45 mL 3   • atorvastatin (LIPITOR) 80 MG tablet Take 1 Tab by mouth every evening. 90 Tab 3   • metoprolol (LOPRESSOR) 25 MG Tab TAKE 1.5 TABS BY MOUTH 2 TIMES A DAY. 270 Tab 3   • omeprazole (PRILOSEC) 20 MG delayed-release capsule Take 1 Cap by mouth every day. 90 Cap 3   • amLODIPine (NORVASC) 5 MG Tab TAKE 1 AND 1/2 TABLETS BY MOUTH EVERY  Tab 3   • losartan (COZAAR) 100 MG Tab Take 1 Tab by mouth every evening. 90 Tab 3   • Acetaminophen (TYLENOL PO) Take 3 Tabs by mouth as needed (For pain). Pt is not sure the strength     • amLODIPine (NORVASC) 2.5 MG Tab Take 7.5 mg by mouth every day.     • nitroglycerin (NITROSTAT) 0.4 MG SL Tab Place 1 Tab under tongue as needed for Chest Pain. Up to 3 every five minutes (Patient not taking: Reported on 8/11/2020) 25 Tab 11   • aspirin EC 81 MG EC tablet Take 1 Tab by mouth every day. 30 Tab 3     No current facility-administered medications for this visit.      He  has a past medical history of Allergy, Anesthesia (2006), Arthritis, CAD (coronary artery disease) (08/15/2018), CATARACT, Hyperlipidemia, Hypertension, Infectious disease (2011), Pain, and Unspecified  "hemorrhagic conditions (2006). He also has no past medical history of ASTHMA.    ROS   No chest pain, no shortness of breath       Objective:     /70 (BP Location: Right arm, Patient Position: Sitting)   Pulse 68   Temp (!) 35.8 °C (96.4 °F) (Temporal)   Ht 1.854 m (6' 1\")   Wt 89.8 kg (198 lb)   SpO2 95%  Body mass index is 26.12 kg/m².   Physical Exam:  Constitutional: Alert, no distress.  Skin: Warm, dry, good turgor, no rashes in visible areas.  Macular papular erythematous lacy rash on abdomen.  Eye: Equal, round and reactive, conjunctiva clear, lids normal.  Psych: Alert and oriented x3, normal affect and mood.        Assessment and Plan:   The following treatment plan was discussed    1. Rash of body  Possibly related to vitamin D versus dryness.  Patient will stop vitamin D as a trial.  If no improvement then I recommended regular moisturizer.    2. Essential hypertension  Controlled.  Continue current medications.    3. Erectile dysfunction due to arterial insufficiency  Stable.  Continue as needed Cialis.  Cautioned patient using nitroglycerin and Cialis, which he is aware of.  - tadalafil (CIALIS) 20 MG tablet; Take 1 Tab by mouth 1 time daily as needed for Erectile Dysfunction.  Dispense: 20 Tab; Refill: 5      Followup: Return if symptoms worsen or fail to improve.         "

## 2021-01-01 ENCOUNTER — APPOINTMENT (OUTPATIENT)
Dept: RADIOLOGY | Facility: MEDICAL CENTER | Age: 76
End: 2021-01-01
Attending: INTERNAL MEDICINE
Payer: MEDICARE

## 2021-01-01 ENCOUNTER — HOSPITAL ENCOUNTER (INPATIENT)
Facility: MEDICAL CENTER | Age: 76
LOS: 2 days | DRG: 175 | End: 2021-03-21
Attending: EMERGENCY MEDICINE | Admitting: HOSPITALIST
Payer: MEDICARE

## 2021-01-01 ENCOUNTER — HOSPITAL ENCOUNTER (EMERGENCY)
Facility: MEDICAL CENTER | Age: 76
End: 2021-03-16
Attending: EMERGENCY MEDICINE
Payer: MEDICARE

## 2021-01-01 ENCOUNTER — APPOINTMENT (OUTPATIENT)
Dept: CARDIOLOGY | Facility: MEDICAL CENTER | Age: 76
DRG: 175 | End: 2021-01-01
Attending: STUDENT IN AN ORGANIZED HEALTH CARE EDUCATION/TRAINING PROGRAM
Payer: MEDICARE

## 2021-01-01 ENCOUNTER — PATIENT MESSAGE (OUTPATIENT)
Dept: CARDIOLOGY | Facility: MEDICAL CENTER | Age: 76
End: 2021-01-01

## 2021-01-01 ENCOUNTER — APPOINTMENT (OUTPATIENT)
Dept: RADIOLOGY | Facility: MEDICAL CENTER | Age: 76
DRG: 175 | End: 2021-01-01
Attending: STUDENT IN AN ORGANIZED HEALTH CARE EDUCATION/TRAINING PROGRAM
Payer: MEDICARE

## 2021-01-01 ENCOUNTER — HOSPITAL ENCOUNTER (OUTPATIENT)
Facility: MEDICAL CENTER | Age: 76
End: 2021-03-30
Attending: NURSE PRACTITIONER
Payer: MEDICARE

## 2021-01-01 ENCOUNTER — APPOINTMENT (OUTPATIENT)
Dept: RADIOLOGY | Facility: MEDICAL CENTER | Age: 76
End: 2021-01-01
Attending: EMERGENCY MEDICINE
Payer: MEDICARE

## 2021-01-01 ENCOUNTER — TELEPHONE (OUTPATIENT)
Dept: CARDIOLOGY | Facility: MEDICAL CENTER | Age: 76
End: 2021-01-01

## 2021-01-01 ENCOUNTER — PHARMACY VISIT (OUTPATIENT)
Dept: PHARMACY | Facility: MEDICAL CENTER | Age: 76
End: 2021-01-01
Payer: COMMERCIAL

## 2021-01-01 ENCOUNTER — HOSPITAL ENCOUNTER (OUTPATIENT)
Dept: RADIOLOGY | Facility: MEDICAL CENTER | Age: 76
End: 2021-03-08
Attending: FAMILY MEDICINE
Payer: MEDICARE

## 2021-01-01 ENCOUNTER — HOSPITAL ENCOUNTER (OUTPATIENT)
Dept: LAB | Facility: MEDICAL CENTER | Age: 76
End: 2021-02-01
Attending: INTERNAL MEDICINE
Payer: MEDICARE

## 2021-01-01 ENCOUNTER — HOSPITAL ENCOUNTER (OUTPATIENT)
Dept: RADIOLOGY | Facility: MEDICAL CENTER | Age: 76
End: 2021-03-19
Payer: MEDICARE

## 2021-01-01 ENCOUNTER — HOSPITAL ENCOUNTER (OUTPATIENT)
Facility: MEDICAL CENTER | Age: 76
End: 2021-03-15
Attending: INTERNAL MEDICINE
Payer: MEDICARE

## 2021-01-01 ENCOUNTER — IMMUNIZATION (OUTPATIENT)
Dept: FAMILY PLANNING/WOMEN'S HEALTH CLINIC | Facility: IMMUNIZATION CENTER | Age: 76
End: 2021-01-01
Attending: INTERNAL MEDICINE
Payer: MEDICARE

## 2021-01-01 ENCOUNTER — HOSPITAL ENCOUNTER (OUTPATIENT)
Facility: MEDICAL CENTER | Age: 76
End: 2021-01-01
Attending: INTERNAL MEDICINE | Admitting: INTERNAL MEDICINE
Payer: MEDICARE

## 2021-01-01 ENCOUNTER — HOSPITAL ENCOUNTER (OUTPATIENT)
Dept: RADIOLOGY | Facility: MEDICAL CENTER | Age: 76
End: 2021-01-12
Attending: INTERNAL MEDICINE
Payer: MEDICARE

## 2021-01-01 ENCOUNTER — OFFICE VISIT (OUTPATIENT)
Dept: CARDIOLOGY | Facility: MEDICAL CENTER | Age: 76
End: 2021-01-01
Payer: MEDICARE

## 2021-01-01 ENCOUNTER — HOSPITAL ENCOUNTER (EMERGENCY)
Facility: MEDICAL CENTER | Age: 76
End: 2021-04-02
Attending: EMERGENCY MEDICINE
Payer: MEDICARE

## 2021-01-01 ENCOUNTER — HOSPITAL ENCOUNTER (OUTPATIENT)
Dept: LAB | Facility: MEDICAL CENTER | Age: 76
End: 2021-02-23
Attending: FAMILY MEDICINE
Payer: MEDICARE

## 2021-01-01 VITALS
SYSTOLIC BLOOD PRESSURE: 157 MMHG | OXYGEN SATURATION: 99 % | BODY MASS INDEX: 25.71 KG/M2 | DIASTOLIC BLOOD PRESSURE: 75 MMHG | WEIGHT: 194 LBS | RESPIRATION RATE: 15 BRPM | TEMPERATURE: 98.5 F | HEART RATE: 82 BPM | HEIGHT: 73 IN

## 2021-01-01 VITALS
OXYGEN SATURATION: 97 % | HEART RATE: 62 BPM | SYSTOLIC BLOOD PRESSURE: 140 MMHG | BODY MASS INDEX: 26.77 KG/M2 | DIASTOLIC BLOOD PRESSURE: 80 MMHG | HEIGHT: 73 IN | WEIGHT: 202 LBS

## 2021-01-01 VITALS
SYSTOLIC BLOOD PRESSURE: 129 MMHG | HEIGHT: 73 IN | DIASTOLIC BLOOD PRESSURE: 71 MMHG | BODY MASS INDEX: 25.01 KG/M2 | OXYGEN SATURATION: 93 % | HEART RATE: 93 BPM | RESPIRATION RATE: 16 BRPM | TEMPERATURE: 98.1 F | WEIGHT: 188.71 LBS

## 2021-01-01 VITALS
TEMPERATURE: 97 F | SYSTOLIC BLOOD PRESSURE: 148 MMHG | WEIGHT: 187.83 LBS | HEIGHT: 73 IN | BODY MASS INDEX: 24.89 KG/M2 | OXYGEN SATURATION: 99 % | HEART RATE: 61 BPM | DIASTOLIC BLOOD PRESSURE: 74 MMHG | RESPIRATION RATE: 16 BRPM

## 2021-01-01 DIAGNOSIS — Z23 NEED FOR VACCINATION: ICD-10-CM

## 2021-01-01 DIAGNOSIS — R10.9 STOMACH ACHE: ICD-10-CM

## 2021-01-01 DIAGNOSIS — I25.10 CORONARY ARTERY DISEASE INVOLVING NATIVE CORONARY ARTERY OF NATIVE HEART WITHOUT ANGINA PECTORIS: ICD-10-CM

## 2021-01-01 DIAGNOSIS — R10.84 GENERALIZED ABDOMINAL PAIN: ICD-10-CM

## 2021-01-01 DIAGNOSIS — I10 ESSENTIAL HYPERTENSION: Chronic | ICD-10-CM

## 2021-01-01 DIAGNOSIS — Z95.820 S/P ANGIOPLASTY WITH STENT: ICD-10-CM

## 2021-01-01 DIAGNOSIS — R60.0 EDEMA OF RIGHT LOWER EXTREMITY: ICD-10-CM

## 2021-01-01 DIAGNOSIS — I26.99 OTHER ACUTE PULMONARY EMBOLISM WITHOUT ACUTE COR PULMONALE (HCC): ICD-10-CM

## 2021-01-01 DIAGNOSIS — E11.9 CONTROLLED TYPE 2 DIABETES MELLITUS WITHOUT COMPLICATION, WITHOUT LONG-TERM CURRENT USE OF INSULIN (HCC): ICD-10-CM

## 2021-01-01 DIAGNOSIS — K86.89 PANCREATIC MASS: ICD-10-CM

## 2021-01-01 DIAGNOSIS — N52.01 ERECTILE DYSFUNCTION DUE TO ARTERIAL INSUFFICIENCY: ICD-10-CM

## 2021-01-01 DIAGNOSIS — Z23 ENCOUNTER FOR VACCINATION: Primary | ICD-10-CM

## 2021-01-01 DIAGNOSIS — I82.493 ACUTE DEEP VEIN THROMBOSIS (DVT) OF OTHER SPECIFIED VEIN OF BOTH LOWER EXTREMITIES (HCC): ICD-10-CM

## 2021-01-01 DIAGNOSIS — I47.20 VENTRICULAR TACHYCARDIA (HCC): ICD-10-CM

## 2021-01-01 DIAGNOSIS — D72.829 LEUKOCYTOSIS, UNSPECIFIED TYPE: ICD-10-CM

## 2021-01-01 DIAGNOSIS — I21.02 ST ELEVATION MYOCARDIAL INFARCTION INVOLVING LEFT ANTERIOR DESCENDING (LAD) CORONARY ARTERY (HCC): ICD-10-CM

## 2021-01-01 DIAGNOSIS — R00.1 BRADYCARDIA: ICD-10-CM

## 2021-01-01 DIAGNOSIS — I51.7 LVH (LEFT VENTRICULAR HYPERTROPHY): ICD-10-CM

## 2021-01-01 DIAGNOSIS — M25.561 ACUTE PAIN OF RIGHT KNEE: ICD-10-CM

## 2021-01-01 DIAGNOSIS — D50.0 IRON DEFICIENCY ANEMIA DUE TO CHRONIC BLOOD LOSS: ICD-10-CM

## 2021-01-01 DIAGNOSIS — M79.89 LEG SWELLING: ICD-10-CM

## 2021-01-01 DIAGNOSIS — I46.9 CARDIAC ARREST (HCC): ICD-10-CM

## 2021-01-01 DIAGNOSIS — R73.01 IFG (IMPAIRED FASTING GLUCOSE): ICD-10-CM

## 2021-01-01 DIAGNOSIS — I70.201 FEMORAL ARTERY STENOSIS, RIGHT (HCC): ICD-10-CM

## 2021-01-01 DIAGNOSIS — C25.9 PANCREATIC CANCER METASTASIZED TO LIVER (HCC): ICD-10-CM

## 2021-01-01 DIAGNOSIS — R94.5 ABNORMAL LIVER FUNCTION: ICD-10-CM

## 2021-01-01 DIAGNOSIS — C78.7 PANCREATIC CANCER METASTASIZED TO LIVER (HCC): ICD-10-CM

## 2021-01-01 DIAGNOSIS — E78.2 MIXED HYPERLIPIDEMIA: ICD-10-CM

## 2021-01-01 DIAGNOSIS — I25.2 HISTORY OF MI (MYOCARDIAL INFARCTION): ICD-10-CM

## 2021-01-01 DIAGNOSIS — I10 ESSENTIAL HYPERTENSION: ICD-10-CM

## 2021-01-01 LAB
ALBUMIN SERPL BCP-MCNC: 2.3 G/DL (ref 3.2–4.9)
ALBUMIN SERPL BCP-MCNC: 2.5 G/DL (ref 3.2–4.9)
ALBUMIN SERPL BCP-MCNC: 2.5 G/DL (ref 3.2–4.9)
ALBUMIN SERPL BCP-MCNC: 2.6 G/DL (ref 3.2–4.9)
ALBUMIN SERPL BCP-MCNC: 2.7 G/DL (ref 3.2–4.9)
ALBUMIN SERPL BCP-MCNC: 3 G/DL (ref 3.2–4.9)
ALBUMIN/GLOB SERPL: 0.7 G/DL
ALBUMIN/GLOB SERPL: 0.8 G/DL
ALBUMIN/GLOB SERPL: 0.8 G/DL
ALBUMIN/GLOB SERPL: 0.9 G/DL
ALP SERPL-CCNC: 269 U/L (ref 30–99)
ALP SERPL-CCNC: 290 U/L (ref 30–99)
ALP SERPL-CCNC: 297 U/L (ref 30–99)
ALP SERPL-CCNC: 334 U/L (ref 30–99)
ALP SERPL-CCNC: 420 U/L (ref 30–99)
ALP SERPL-CCNC: 425 U/L (ref 30–99)
ALT SERPL-CCNC: 22 U/L (ref 2–50)
ALT SERPL-CCNC: 29 U/L (ref 2–50)
ALT SERPL-CCNC: 31 U/L (ref 2–50)
ALT SERPL-CCNC: 32 U/L (ref 2–50)
ALT SERPL-CCNC: 34 U/L (ref 2–50)
ALT SERPL-CCNC: 35 U/L (ref 2–50)
ANION GAP SERPL CALC-SCNC: 10 MMOL/L (ref 7–16)
ANION GAP SERPL CALC-SCNC: 10 MMOL/L (ref 7–16)
ANION GAP SERPL CALC-SCNC: 11 MMOL/L (ref 7–16)
ANION GAP SERPL CALC-SCNC: 12 MMOL/L (ref 7–16)
ANION GAP SERPL CALC-SCNC: 14 MMOL/L (ref 7–16)
ANION GAP SERPL CALC-SCNC: 14 MMOL/L (ref 7–16)
ANION GAP SERPL CALC-SCNC: 5 MMOL/L (ref 7–16)
ANION GAP SERPL CALC-SCNC: 9 MMOL/L (ref 7–16)
APPEARANCE UR: CLEAR
APTT PPP: 232.1 SEC (ref 24.7–36)
APTT PPP: 34.3 SEC (ref 24.7–36)
APTT PPP: >240 SEC (ref 24.7–36)
AST SERPL-CCNC: 33 U/L (ref 12–45)
AST SERPL-CCNC: 37 U/L (ref 12–45)
AST SERPL-CCNC: 41 U/L (ref 12–45)
AST SERPL-CCNC: 43 U/L (ref 12–45)
AST SERPL-CCNC: 57 U/L (ref 12–45)
AST SERPL-CCNC: 63 U/L (ref 12–45)
BACTERIA #/AREA URNS HPF: NEGATIVE /HPF
BACTERIA BLD CULT: NORMAL
BACTERIA BLD CULT: NORMAL
BACTERIA UR CULT: NORMAL
BASOPHILS # BLD AUTO: 0.3 % (ref 0–1.8)
BASOPHILS # BLD AUTO: 0.4 % (ref 0–1.8)
BASOPHILS # BLD AUTO: 0.6 % (ref 0–1.8)
BASOPHILS # BLD AUTO: 0.6 % (ref 0–1.8)
BASOPHILS # BLD: 0.05 K/UL (ref 0–0.12)
BASOPHILS # BLD: 0.05 K/UL (ref 0–0.12)
BASOPHILS # BLD: 0.06 K/UL (ref 0–0.12)
BASOPHILS # BLD: 0.07 K/UL (ref 0–0.12)
BASOPHILS # BLD: 0.09 K/UL (ref 0–0.12)
BASOPHILS # BLD: 0.1 K/UL (ref 0–0.12)
BILIRUB SERPL-MCNC: 0.7 MG/DL (ref 0.1–1.5)
BILIRUB SERPL-MCNC: 0.9 MG/DL (ref 0.1–1.5)
BILIRUB SERPL-MCNC: 0.9 MG/DL (ref 0.1–1.5)
BILIRUB SERPL-MCNC: 1 MG/DL (ref 0.1–1.5)
BILIRUB SERPL-MCNC: 1.1 MG/DL (ref 0.1–1.5)
BILIRUB SERPL-MCNC: 1.3 MG/DL (ref 0.1–1.5)
BILIRUB UR QL STRIP.AUTO: ABNORMAL
BLOOD CULTURE HOLD CXBCH: NORMAL
BUN SERPL-MCNC: 12 MG/DL (ref 8–22)
BUN SERPL-MCNC: 13 MG/DL (ref 8–22)
BUN SERPL-MCNC: 14 MG/DL (ref 8–22)
BUN SERPL-MCNC: 16 MG/DL (ref 8–22)
BUN SERPL-MCNC: 19 MG/DL (ref 8–22)
BUN SERPL-MCNC: 9 MG/DL (ref 8–22)
CALCIUM SERPL-MCNC: 7.8 MG/DL (ref 8.5–10.5)
CALCIUM SERPL-MCNC: 7.9 MG/DL (ref 8.5–10.5)
CALCIUM SERPL-MCNC: 8.1 MG/DL (ref 8.5–10.5)
CALCIUM SERPL-MCNC: 8.6 MG/DL (ref 8.5–10.5)
CALCIUM SERPL-MCNC: 8.7 MG/DL (ref 8.4–10.2)
CALCIUM SERPL-MCNC: 8.7 MG/DL (ref 8.5–10.5)
CALCIUM SERPL-MCNC: 8.8 MG/DL (ref 8.5–10.5)
CALCIUM SERPL-MCNC: 9.7 MG/DL (ref 8.5–10.5)
CANCER AG19-9 SERPL-ACNC: 391 U/ML (ref 0–35)
CEA SERPL-MCNC: 370 NG/ML (ref 0–3)
CHLORIDE SERPL-SCNC: 94 MMOL/L (ref 96–112)
CHLORIDE SERPL-SCNC: 95 MMOL/L (ref 96–112)
CHLORIDE SERPL-SCNC: 96 MMOL/L (ref 96–112)
CHLORIDE SERPL-SCNC: 97 MMOL/L (ref 96–112)
CO2 SERPL-SCNC: 23 MMOL/L (ref 20–33)
CO2 SERPL-SCNC: 23 MMOL/L (ref 20–33)
CO2 SERPL-SCNC: 24 MMOL/L (ref 20–33)
CO2 SERPL-SCNC: 24 MMOL/L (ref 20–33)
CO2 SERPL-SCNC: 26 MMOL/L (ref 20–33)
CO2 SERPL-SCNC: 27 MMOL/L (ref 20–33)
CO2 SERPL-SCNC: 29 MMOL/L (ref 20–33)
CO2 SERPL-SCNC: 30 MMOL/L (ref 20–33)
COLOR UR: ABNORMAL
CREAT SERPL-MCNC: 0.46 MG/DL (ref 0.5–1.4)
CREAT SERPL-MCNC: 0.5 MG/DL (ref 0.5–1.4)
CREAT SERPL-MCNC: 0.61 MG/DL (ref 0.5–1.4)
CREAT SERPL-MCNC: 0.63 MG/DL (ref 0.5–1.4)
CREAT SERPL-MCNC: 0.63 MG/DL (ref 0.5–1.4)
CREAT SERPL-MCNC: 0.68 MG/DL (ref 0.5–1.4)
CREAT SERPL-MCNC: 0.79 MG/DL (ref 0.5–1.4)
CREAT SERPL-MCNC: 1.01 MG/DL (ref 0.5–1.4)
EKG IMPRESSION: NORMAL
EOSINOPHIL # BLD AUTO: 0.25 K/UL (ref 0–0.51)
EOSINOPHIL # BLD AUTO: 0.4 K/UL (ref 0–0.51)
EOSINOPHIL # BLD AUTO: 0.43 K/UL (ref 0–0.51)
EOSINOPHIL # BLD AUTO: 0.45 K/UL (ref 0–0.51)
EOSINOPHIL # BLD AUTO: 0.47 K/UL (ref 0–0.51)
EOSINOPHIL # BLD AUTO: 0.54 K/UL (ref 0–0.51)
EOSINOPHIL NFR BLD: 1.4 % (ref 0–6.9)
EOSINOPHIL NFR BLD: 1.8 % (ref 0–6.9)
EOSINOPHIL NFR BLD: 2.5 % (ref 0–6.9)
EOSINOPHIL NFR BLD: 2.8 % (ref 0–6.9)
EOSINOPHIL NFR BLD: 3.4 % (ref 0–6.9)
EOSINOPHIL NFR BLD: 3.7 % (ref 0–6.9)
EPI CELLS #/AREA URNS HPF: ABNORMAL /HPF
ERYTHROCYTE [DISTWIDTH] IN BLOOD BY AUTOMATED COUNT: 40.2 FL (ref 35.9–50)
ERYTHROCYTE [DISTWIDTH] IN BLOOD BY AUTOMATED COUNT: 42.3 FL (ref 35.9–50)
ERYTHROCYTE [DISTWIDTH] IN BLOOD BY AUTOMATED COUNT: 42.5 FL (ref 35.9–50)
ERYTHROCYTE [DISTWIDTH] IN BLOOD BY AUTOMATED COUNT: 45.3 FL (ref 35.9–50)
EST. AVERAGE GLUCOSE BLD GHB EST-MCNC: 229 MG/DL
FERRITIN SERPL-MCNC: 1995 NG/ML (ref 22–322)
FOLATE SERPL-MCNC: 8.9 NG/ML
GLOBULIN SER CALC-MCNC: 2.9 G/DL (ref 1.9–3.5)
GLOBULIN SER CALC-MCNC: 3 G/DL (ref 1.9–3.5)
GLOBULIN SER CALC-MCNC: 3 G/DL (ref 1.9–3.5)
GLOBULIN SER CALC-MCNC: 3.1 G/DL (ref 1.9–3.5)
GLOBULIN SER CALC-MCNC: 3.4 G/DL (ref 1.9–3.5)
GLOBULIN SER CALC-MCNC: 3.4 G/DL (ref 1.9–3.5)
GLUCOSE SERPL-MCNC: 178 MG/DL (ref 65–99)
GLUCOSE SERPL-MCNC: 180 MG/DL (ref 65–99)
GLUCOSE SERPL-MCNC: 184 MG/DL (ref 65–99)
GLUCOSE SERPL-MCNC: 184 MG/DL (ref 65–99)
GLUCOSE SERPL-MCNC: 247 MG/DL (ref 65–99)
GLUCOSE SERPL-MCNC: 249 MG/DL (ref 65–99)
GLUCOSE SERPL-MCNC: 268 MG/DL (ref 65–99)
GLUCOSE SERPL-MCNC: 330 MG/DL (ref 65–99)
GLUCOSE UR STRIP.AUTO-MCNC: NEGATIVE MG/DL
HBA1C MFR BLD: 9.6 % (ref 4–5.6)
HBV CORE AB SERPL QL IA: NONREACTIVE
HBV SURFACE AB SERPL IA-ACNC: 196 MIU/ML (ref 0–10)
HBV SURFACE AG SER QL: NORMAL
HCT VFR BLD AUTO: 35.9 % (ref 42–52)
HCT VFR BLD AUTO: 37.7 % (ref 42–52)
HCT VFR BLD AUTO: 38.5 % (ref 42–52)
HCT VFR BLD AUTO: 39.2 % (ref 42–52)
HCT VFR BLD AUTO: 42 % (ref 42–52)
HCT VFR BLD AUTO: 45 % (ref 42–52)
HGB BLD-MCNC: 11.3 G/DL (ref 14–18)
HGB BLD-MCNC: 11.6 G/DL (ref 14–18)
HGB BLD-MCNC: 12 G/DL (ref 14–18)
HGB BLD-MCNC: 12.3 G/DL (ref 14–18)
HGB BLD-MCNC: 13.6 G/DL (ref 14–18)
HGB BLD-MCNC: 14.1 G/DL (ref 14–18)
HGB RETIC QN AUTO: 27 PG/CELL (ref 29–35)
HYALINE CASTS #/AREA URNS LPF: ABNORMAL /LPF
IMM GRANULOCYTES # BLD AUTO: 0.06 K/UL (ref 0–0.11)
IMM GRANULOCYTES # BLD AUTO: 0.08 K/UL (ref 0–0.11)
IMM GRANULOCYTES # BLD AUTO: 0.09 K/UL (ref 0–0.11)
IMM GRANULOCYTES # BLD AUTO: 0.11 K/UL (ref 0–0.11)
IMM GRANULOCYTES # BLD AUTO: 0.17 K/UL (ref 0–0.11)
IMM GRANULOCYTES # BLD AUTO: 0.31 K/UL (ref 0–0.11)
IMM GRANULOCYTES NFR BLD AUTO: 0.4 % (ref 0–0.9)
IMM GRANULOCYTES NFR BLD AUTO: 0.5 % (ref 0–0.9)
IMM GRANULOCYTES NFR BLD AUTO: 0.5 % (ref 0–0.9)
IMM GRANULOCYTES NFR BLD AUTO: 0.7 % (ref 0–0.9)
IMM GRANULOCYTES NFR BLD AUTO: 0.9 % (ref 0–0.9)
IMM GRANULOCYTES NFR BLD AUTO: 1.4 % (ref 0–0.9)
IMM RETICS NFR: 17.1 % (ref 9.3–17.4)
INR PPP: 1.36 (ref 0.87–1.13)
INR PPP: 1.37 (ref 0.87–1.13)
INR PPP: 1.44 (ref 0.87–1.13)
IRON SATN MFR SERPL: 9 % (ref 15–55)
IRON SERPL-MCNC: 19 UG/DL (ref 50–180)
KETONES UR STRIP.AUTO-MCNC: ABNORMAL MG/DL
LACTATE BLD-SCNC: 2.8 MMOL/L (ref 0.5–2)
LEUKOCYTE ESTERASE UR QL STRIP.AUTO: ABNORMAL
LIPASE SERPL-CCNC: 17 U/L (ref 7–58)
LV EJECT FRACT  99904: 65
LV EJECT FRACT MOD 2C 99903: 52.72
LV EJECT FRACT MOD 4C 99902: 49.98
LV EJECT FRACT MOD BP 99901: 50.02
LYMPHOCYTES # BLD AUTO: 1.36 K/UL (ref 1–4.8)
LYMPHOCYTES # BLD AUTO: 2.07 K/UL (ref 1–4.8)
LYMPHOCYTES # BLD AUTO: 2.18 K/UL (ref 1–4.8)
LYMPHOCYTES # BLD AUTO: 2.21 K/UL (ref 1–4.8)
LYMPHOCYTES # BLD AUTO: 2.26 K/UL (ref 1–4.8)
LYMPHOCYTES # BLD AUTO: 2.75 K/UL (ref 1–4.8)
LYMPHOCYTES NFR BLD: 12 % (ref 22–41)
LYMPHOCYTES NFR BLD: 13.1 % (ref 22–41)
LYMPHOCYTES NFR BLD: 14 % (ref 22–41)
LYMPHOCYTES NFR BLD: 15.9 % (ref 22–41)
LYMPHOCYTES NFR BLD: 17.3 % (ref 22–41)
LYMPHOCYTES NFR BLD: 6 % (ref 22–41)
MAGNESIUM SERPL-MCNC: 1.7 MG/DL (ref 1.5–2.5)
MAGNESIUM SERPL-MCNC: 1.8 MG/DL (ref 1.5–2.5)
MAGNESIUM SERPL-MCNC: 1.8 MG/DL (ref 1.5–2.5)
MCH RBC QN AUTO: 26.2 PG (ref 27–33)
MCH RBC QN AUTO: 28 PG (ref 27–33)
MCH RBC QN AUTO: 28 PG (ref 27–33)
MCH RBC QN AUTO: 28.4 PG (ref 27–33)
MCH RBC QN AUTO: 28.5 PG (ref 27–33)
MCH RBC QN AUTO: 28.7 PG (ref 27–33)
MCHC RBC AUTO-ENTMCNC: 30.1 G/DL (ref 33.7–35.3)
MCHC RBC AUTO-ENTMCNC: 31.3 G/DL (ref 33.7–35.3)
MCHC RBC AUTO-ENTMCNC: 31.4 G/DL (ref 33.7–35.3)
MCHC RBC AUTO-ENTMCNC: 31.5 G/DL (ref 33.7–35.3)
MCHC RBC AUTO-ENTMCNC: 31.8 G/DL (ref 33.7–35.3)
MCHC RBC AUTO-ENTMCNC: 32.4 G/DL (ref 33.7–35.3)
MCV RBC AUTO: 86.9 FL (ref 81.4–97.8)
MCV RBC AUTO: 87.9 FL (ref 81.4–97.8)
MCV RBC AUTO: 89.1 FL (ref 81.4–97.8)
MCV RBC AUTO: 89.1 FL (ref 81.4–97.8)
MCV RBC AUTO: 89.3 FL (ref 81.4–97.8)
MCV RBC AUTO: 91.6 FL (ref 81.4–97.8)
MICRO URNS: ABNORMAL
MONOCYTES # BLD AUTO: 1.16 K/UL (ref 0–0.85)
MONOCYTES # BLD AUTO: 1.36 K/UL (ref 0–0.85)
MONOCYTES # BLD AUTO: 1.65 K/UL (ref 0–0.85)
MONOCYTES # BLD AUTO: 1.65 K/UL (ref 0–0.85)
MONOCYTES # BLD AUTO: 1.9 K/UL (ref 0–0.85)
MONOCYTES # BLD AUTO: 1.99 K/UL (ref 0–0.85)
MONOCYTES NFR BLD AUTO: 10.4 % (ref 0–13.4)
MONOCYTES NFR BLD AUTO: 10.8 % (ref 0–13.4)
MONOCYTES NFR BLD AUTO: 8.4 % (ref 0–13.4)
MONOCYTES NFR BLD AUTO: 8.5 % (ref 0–13.4)
MONOCYTES NFR BLD AUTO: 9.2 % (ref 0–13.4)
MONOCYTES NFR BLD AUTO: 9.6 % (ref 0–13.4)
MUCOUS THREADS #/AREA URNS HPF: ABNORMAL /HPF
NEUTROPHILS # BLD AUTO: 10.63 K/UL (ref 1.82–7.42)
NEUTROPHILS # BLD AUTO: 10.86 K/UL (ref 1.82–7.42)
NEUTROPHILS # BLD AUTO: 12.67 K/UL (ref 1.82–7.42)
NEUTROPHILS # BLD AUTO: 13.79 K/UL (ref 1.82–7.42)
NEUTROPHILS # BLD AUTO: 18.65 K/UL (ref 1.82–7.42)
NEUTROPHILS # BLD AUTO: 9.74 K/UL (ref 1.82–7.42)
NEUTROPHILS NFR BLD: 68.4 % (ref 44–72)
NEUTROPHILS NFR BLD: 71.4 % (ref 44–72)
NEUTROPHILS NFR BLD: 72.1 % (ref 44–72)
NEUTROPHILS NFR BLD: 73.7 % (ref 44–72)
NEUTROPHILS NFR BLD: 74.6 % (ref 44–72)
NEUTROPHILS NFR BLD: 82.1 % (ref 44–72)
NITRITE UR QL STRIP.AUTO: POSITIVE
NRBC # BLD AUTO: 0 K/UL
NRBC BLD-RTO: 0 /100 WBC
PATHOLOGY CONSULT NOTE: NORMAL
PH UR STRIP.AUTO: 5 [PH] (ref 5–8)
PLATELET # BLD AUTO: 175 K/UL (ref 164–446)
PLATELET # BLD AUTO: 191 K/UL (ref 164–446)
PLATELET # BLD AUTO: 203 K/UL (ref 164–446)
PLATELET # BLD AUTO: 211 K/UL (ref 164–446)
PLATELET # BLD AUTO: 217 K/UL (ref 164–446)
PLATELET # BLD AUTO: 265 K/UL (ref 164–446)
PMV BLD AUTO: 11 FL (ref 9–12.9)
PMV BLD AUTO: 11.5 FL (ref 9–12.9)
PMV BLD AUTO: 11.5 FL (ref 9–12.9)
PMV BLD AUTO: 11.7 FL (ref 9–12.9)
PMV BLD AUTO: 11.7 FL (ref 9–12.9)
PMV BLD AUTO: 12.1 FL (ref 9–12.9)
POTASSIUM SERPL-SCNC: 3.8 MMOL/L (ref 3.6–5.5)
POTASSIUM SERPL-SCNC: 3.8 MMOL/L (ref 3.6–5.5)
POTASSIUM SERPL-SCNC: 3.9 MMOL/L (ref 3.6–5.5)
POTASSIUM SERPL-SCNC: 4 MMOL/L (ref 3.6–5.5)
POTASSIUM SERPL-SCNC: 4.1 MMOL/L (ref 3.6–5.5)
POTASSIUM SERPL-SCNC: 4.1 MMOL/L (ref 3.6–5.5)
POTASSIUM SERPL-SCNC: 4.4 MMOL/L (ref 3.6–5.5)
POTASSIUM SERPL-SCNC: 4.7 MMOL/L (ref 3.6–5.5)
PROT SERPL-MCNC: 5.3 G/DL (ref 6–8.2)
PROT SERPL-MCNC: 5.6 G/DL (ref 6–8.2)
PROT SERPL-MCNC: 5.9 G/DL (ref 6–8.2)
PROT SERPL-MCNC: 6.4 G/DL (ref 6–8.2)
PROT UR QL STRIP: 30 MG/DL
PROTHROMBIN TIME: 17.2 SEC (ref 12–14.6)
PROTHROMBIN TIME: 17.3 SEC (ref 12–14.6)
PROTHROMBIN TIME: 18 SEC (ref 12–14.6)
RBC # BLD AUTO: 4.03 M/UL (ref 4.7–6.1)
RBC # BLD AUTO: 4.23 M/UL (ref 4.7–6.1)
RBC # BLD AUTO: 4.39 M/UL (ref 4.7–6.1)
RBC # BLD AUTO: 4.43 M/UL (ref 4.7–6.1)
RBC # BLD AUTO: 4.78 M/UL (ref 4.7–6.1)
RBC # BLD AUTO: 4.91 M/UL (ref 4.7–6.1)
RBC # URNS HPF: ABNORMAL /HPF
RBC UR QL AUTO: NEGATIVE
RENAL EPI CELLS #/AREA URNS HPF: ABNORMAL /HPF
RETICS # AUTO: 0.09 M/UL (ref 0.04–0.06)
RETICS/RBC NFR: 1.9 % (ref 0.8–2.1)
SARS-COV-2 RNA RESP QL NAA+PROBE: NOTDETECTED
SIGNIFICANT IND 70042: NORMAL
SITE SITE: NORMAL
SODIUM SERPL-SCNC: 129 MMOL/L (ref 135–145)
SODIUM SERPL-SCNC: 130 MMOL/L (ref 135–145)
SODIUM SERPL-SCNC: 130 MMOL/L (ref 135–145)
SODIUM SERPL-SCNC: 133 MMOL/L (ref 135–145)
SODIUM SERPL-SCNC: 134 MMOL/L (ref 135–145)
SODIUM SERPL-SCNC: 135 MMOL/L (ref 135–145)
SOURCE SOURCE: NORMAL
SP GR UR STRIP.AUTO: 1.02
SPECIMEN SOURCE: NORMAL
TIBC SERPL-MCNC: 202 UG/DL (ref 250–450)
TSH SERPL DL<=0.005 MIU/L-ACNC: 3.8 UIU/ML (ref 0.38–5.33)
UFH PPP CHRO-ACNC: 0.13 IU/ML
UFH PPP CHRO-ACNC: 0.26 IU/ML
UFH PPP CHRO-ACNC: 0.37 IU/ML
UFH PPP CHRO-ACNC: 0.56 IU/ML
UFH PPP CHRO-ACNC: <0.1 IU/ML
UIBC SERPL-MCNC: 183 UG/DL (ref 110–370)
UROBILINOGEN UR STRIP.AUTO-MCNC: 2 MG/DL
VIT B12 SERPL-MCNC: >4000 PG/ML (ref 211–911)
WBC # BLD AUTO: 13.7 K/UL (ref 4.8–10.8)
WBC # BLD AUTO: 14.8 K/UL (ref 4.8–10.8)
WBC # BLD AUTO: 15.9 K/UL (ref 4.8–10.8)
WBC # BLD AUTO: 17.2 K/UL (ref 4.8–10.8)
WBC # BLD AUTO: 18.5 K/UL (ref 4.8–10.8)
WBC # BLD AUTO: 22.7 K/UL (ref 4.8–10.8)
WBC #/AREA URNS HPF: ABNORMAL /HPF

## 2021-01-01 PROCEDURE — 82607 VITAMIN B-12: CPT

## 2021-01-01 PROCEDURE — 93306 TTE W/DOPPLER COMPLETE: CPT | Mod: 26 | Performed by: INTERNAL MEDICINE

## 2021-01-01 PROCEDURE — 0011A MODERNA SARS-COV-2 VACCINE: CPT | Performed by: NURSE PRACTITIONER

## 2021-01-01 PROCEDURE — 97161 PT EVAL LOW COMPLEX 20 MIN: CPT

## 2021-01-01 PROCEDURE — 94760 N-INVAS EAR/PLS OXIMETRY 1: CPT

## 2021-01-01 PROCEDURE — 700117 HCHG RX CONTRAST REV CODE 255: Performed by: INTERNAL MEDICINE

## 2021-01-01 PROCEDURE — 700105 HCHG RX REV CODE 258: Performed by: STUDENT IN AN ORGANIZED HEALTH CARE EDUCATION/TRAINING PROGRAM

## 2021-01-01 PROCEDURE — 85520 HEPARIN ASSAY: CPT | Mod: 91

## 2021-01-01 PROCEDURE — 88341 IMHCHEM/IMCYTCHM EA ADD ANTB: CPT

## 2021-01-01 PROCEDURE — 87040 BLOOD CULTURE FOR BACTERIA: CPT | Mod: 91

## 2021-01-01 PROCEDURE — 83735 ASSAY OF MAGNESIUM: CPT

## 2021-01-01 PROCEDURE — 85025 COMPLETE CBC W/AUTO DIFF WBC: CPT

## 2021-01-01 PROCEDURE — 83036 HEMOGLOBIN GLYCOSYLATED A1C: CPT

## 2021-01-01 PROCEDURE — 770020 HCHG ROOM/CARE - TELE (206)

## 2021-01-01 PROCEDURE — 93010 ELECTROCARDIOGRAM REPORT: CPT | Performed by: INTERNAL MEDICINE

## 2021-01-01 PROCEDURE — 99284 EMERGENCY DEPT VISIT MOD MDM: CPT

## 2021-01-01 PROCEDURE — 700102 HCHG RX REV CODE 250 W/ 637 OVERRIDE(OP): Performed by: STUDENT IN AN ORGANIZED HEALTH CARE EDUCATION/TRAINING PROGRAM

## 2021-01-01 PROCEDURE — 700111 HCHG RX REV CODE 636 W/ 250 OVERRIDE (IP): Performed by: EMERGENCY MEDICINE

## 2021-01-01 PROCEDURE — 74177 CT ABD & PELVIS W/CONTRAST: CPT | Mod: MH

## 2021-01-01 PROCEDURE — 88307 TISSUE EXAM BY PATHOLOGIST: CPT

## 2021-01-01 PROCEDURE — 93971 EXTREMITY STUDY: CPT | Mod: RT

## 2021-01-01 PROCEDURE — 96365 THER/PROPH/DIAG IV INF INIT: CPT

## 2021-01-01 PROCEDURE — 700117 HCHG RX CONTRAST REV CODE 255: Performed by: STUDENT IN AN ORGANIZED HEALTH CARE EDUCATION/TRAINING PROGRAM

## 2021-01-01 PROCEDURE — 97165 OT EVAL LOW COMPLEX 30 MIN: CPT

## 2021-01-01 PROCEDURE — 36415 COLL VENOUS BLD VENIPUNCTURE: CPT

## 2021-01-01 PROCEDURE — 88342 IMHCHEM/IMCYTCHM 1ST ANTB: CPT

## 2021-01-01 PROCEDURE — 700117 HCHG RX CONTRAST REV CODE 255: Performed by: EMERGENCY MEDICINE

## 2021-01-01 PROCEDURE — 96375 TX/PRO/DX INJ NEW DRUG ADDON: CPT

## 2021-01-01 PROCEDURE — 71045 X-RAY EXAM CHEST 1 VIEW: CPT

## 2021-01-01 PROCEDURE — 700102 HCHG RX REV CODE 250 W/ 637 OVERRIDE(OP): Performed by: RADIOLOGY

## 2021-01-01 PROCEDURE — 700111 HCHG RX REV CODE 636 W/ 250 OVERRIDE (IP): Performed by: RADIOLOGY

## 2021-01-01 PROCEDURE — 80048 BASIC METABOLIC PNL TOTAL CA: CPT

## 2021-01-01 PROCEDURE — 93005 ELECTROCARDIOGRAM TRACING: CPT | Performed by: STUDENT IN AN ORGANIZED HEALTH CARE EDUCATION/TRAINING PROGRAM

## 2021-01-01 PROCEDURE — 87340 HEPATITIS B SURFACE AG IA: CPT | Mod: GA

## 2021-01-01 PROCEDURE — 85610 PROTHROMBIN TIME: CPT

## 2021-01-01 PROCEDURE — 93970 EXTREMITY STUDY: CPT

## 2021-01-01 PROCEDURE — 85046 RETICYTE/HGB CONCENTRATE: CPT

## 2021-01-01 PROCEDURE — 82378 CARCINOEMBRYONIC ANTIGEN: CPT

## 2021-01-01 PROCEDURE — RXMED WILLOW AMBULATORY MEDICATION CHARGE: Performed by: STUDENT IN AN ORGANIZED HEALTH CARE EDUCATION/TRAINING PROGRAM

## 2021-01-01 PROCEDURE — 99285 EMERGENCY DEPT VISIT HI MDM: CPT

## 2021-01-01 PROCEDURE — 83540 ASSAY OF IRON: CPT

## 2021-01-01 PROCEDURE — 86706 HEP B SURFACE ANTIBODY: CPT | Mod: GA

## 2021-01-01 PROCEDURE — 0012A MODERNA SARS-COV-2 VACCINE: CPT

## 2021-01-01 PROCEDURE — 80053 COMPREHEN METABOLIC PANEL: CPT

## 2021-01-01 PROCEDURE — A9270 NON-COVERED ITEM OR SERVICE: HCPCS | Performed by: STUDENT IN AN ORGANIZED HEALTH CARE EDUCATION/TRAINING PROGRAM

## 2021-01-01 PROCEDURE — 99153 MOD SED SAME PHYS/QHP EA: CPT

## 2021-01-01 PROCEDURE — 85520 HEPARIN ASSAY: CPT

## 2021-01-01 PROCEDURE — 85730 THROMBOPLASTIN TIME PARTIAL: CPT

## 2021-01-01 PROCEDURE — 83550 IRON BINDING TEST: CPT

## 2021-01-01 PROCEDURE — 81001 URINALYSIS AUTO W/SCOPE: CPT

## 2021-01-01 PROCEDURE — 82728 ASSAY OF FERRITIN: CPT

## 2021-01-01 PROCEDURE — 700111 HCHG RX REV CODE 636 W/ 250 OVERRIDE (IP): Performed by: STUDENT IN AN ORGANIZED HEALTH CARE EDUCATION/TRAINING PROGRAM

## 2021-01-01 PROCEDURE — 88313 SPECIAL STAINS GROUP 2: CPT

## 2021-01-01 PROCEDURE — 93926 LOWER EXTREMITY STUDY: CPT | Mod: 26,RT,GZ | Performed by: INTERNAL MEDICINE

## 2021-01-01 PROCEDURE — 06H03DZ INSERTION OF INTRALUMINAL DEVICE INTO INFERIOR VENA CAVA, PERCUTANEOUS APPROACH: ICD-10-PCS | Performed by: RADIOLOGY

## 2021-01-01 PROCEDURE — 82746 ASSAY OF FOLIC ACID SERUM: CPT

## 2021-01-01 PROCEDURE — U0003 INFECTIOUS AGENT DETECTION BY NUCLEIC ACID (DNA OR RNA); SEVERE ACUTE RESPIRATORY SYNDROME CORONAVIRUS 2 (SARS-COV-2) (CORONAVIRUS DISEASE [COVID-19]), AMPLIFIED PROBE TECHNIQUE, MAKING USE OF HIGH THROUGHPUT TECHNOLOGIES AS DESCRIBED BY CMS-2020-01-R: HCPCS

## 2021-01-01 PROCEDURE — 91301 MODERNA SARS-COV-2 VACCINE: CPT | Performed by: NURSE PRACTITIONER

## 2021-01-01 PROCEDURE — 700102 HCHG RX REV CODE 250 W/ 637 OVERRIDE(OP)

## 2021-01-01 PROCEDURE — 96374 THER/PROPH/DIAG INJ IV PUSH: CPT

## 2021-01-01 PROCEDURE — 99239 HOSP IP/OBS DSCHRG MGMT >30: CPT | Mod: GC | Performed by: HOSPITALIST

## 2021-01-01 PROCEDURE — 99214 OFFICE O/P EST MOD 30 MIN: CPT | Performed by: INTERNAL MEDICINE

## 2021-01-01 PROCEDURE — 99152 MOD SED SAME PHYS/QHP 5/>YRS: CPT

## 2021-01-01 PROCEDURE — 91301 MODERNA SARS-COV-2 VACCINE: CPT

## 2021-01-01 PROCEDURE — 0FB23ZX EXCISION OF LEFT LOBE LIVER, PERCUTANEOUS APPROACH, DIAGNOSTIC: ICD-10-PCS | Performed by: RADIOLOGY

## 2021-01-01 PROCEDURE — A9270 NON-COVERED ITEM OR SERVICE: HCPCS

## 2021-01-01 PROCEDURE — A9270 NON-COVERED ITEM OR SERVICE: HCPCS | Performed by: RADIOLOGY

## 2021-01-01 PROCEDURE — 93306 TTE W/DOPPLER COMPLETE: CPT

## 2021-01-01 PROCEDURE — 84443 ASSAY THYROID STIM HORMONE: CPT

## 2021-01-01 PROCEDURE — 99233 SBSQ HOSP IP/OBS HIGH 50: CPT | Mod: GC | Performed by: HOSPITALIST

## 2021-01-01 PROCEDURE — 93926 LOWER EXTREMITY STUDY: CPT | Mod: RT

## 2021-01-01 PROCEDURE — 86301 IMMUNOASSAY TUMOR CA 19-9: CPT

## 2021-01-01 PROCEDURE — 93971 EXTREMITY STUDY: CPT | Mod: LT

## 2021-01-01 PROCEDURE — U0005 INFEC AGEN DETEC AMPLI PROBE: HCPCS

## 2021-01-01 PROCEDURE — 96366 THER/PROPH/DIAG IV INF ADDON: CPT

## 2021-01-01 PROCEDURE — 86704 HEP B CORE ANTIBODY TOTAL: CPT | Mod: GA

## 2021-01-01 PROCEDURE — 83605 ASSAY OF LACTIC ACID: CPT

## 2021-01-01 PROCEDURE — 93971 EXTREMITY STUDY: CPT | Mod: 26,RT | Performed by: INTERNAL MEDICINE

## 2021-01-01 PROCEDURE — 700105 HCHG RX REV CODE 258: Performed by: EMERGENCY MEDICINE

## 2021-01-01 PROCEDURE — 83690 ASSAY OF LIPASE: CPT

## 2021-01-01 PROCEDURE — 99223 1ST HOSP IP/OBS HIGH 75: CPT | Mod: AI,GC | Performed by: HOSPITALIST

## 2021-01-01 PROCEDURE — 87086 URINE CULTURE/COLONY COUNT: CPT

## 2021-01-01 PROCEDURE — 93971 EXTREMITY STUDY: CPT | Mod: 26 | Performed by: INTERNAL MEDICINE

## 2021-01-01 RX ORDER — HYDROCHLOROTHIAZIDE 25 MG/1
25 TABLET ORAL
COMMUNITY
Start: 2021-01-01 | End: 2021-01-01

## 2021-01-01 RX ORDER — IPRATROPIUM BROMIDE 42 UG/1
2 SPRAY, METERED NASAL 2 TIMES DAILY PRN
Status: DISCONTINUED | OUTPATIENT
Start: 2021-01-01 | End: 2021-01-01 | Stop reason: HOSPADM

## 2021-01-01 RX ORDER — ATORVASTATIN CALCIUM 80 MG/1
80 TABLET, FILM COATED ORAL EVERY EVENING
Status: DISCONTINUED | OUTPATIENT
Start: 2021-01-01 | End: 2021-01-01 | Stop reason: HOSPADM

## 2021-01-01 RX ORDER — MIDAZOLAM HYDROCHLORIDE 1 MG/ML
INJECTION INTRAMUSCULAR; INTRAVENOUS
Status: DISPENSED
Start: 2021-01-01 | End: 2021-01-01

## 2021-01-01 RX ORDER — AMLODIPINE BESYLATE 2.5 MG/1
2.5 TABLET ORAL DAILY
Qty: 100 TAB | Refills: 3 | Status: SHIPPED | OUTPATIENT
Start: 2021-01-01 | End: 2021-01-01 | Stop reason: SDUPTHER

## 2021-01-01 RX ORDER — MAGNESIUM SULFATE HEPTAHYDRATE 40 MG/ML
4 INJECTION, SOLUTION INTRAVENOUS ONCE
Status: COMPLETED | OUTPATIENT
Start: 2021-01-01 | End: 2021-01-01

## 2021-01-01 RX ORDER — CHOLECALCIFEROL (VITAMIN D3) 125 MCG
5 CAPSULE ORAL
Status: DISCONTINUED | OUTPATIENT
Start: 2021-01-01 | End: 2021-01-01 | Stop reason: HOSPADM

## 2021-01-01 RX ORDER — SODIUM CHLORIDE 9 MG/ML
500 INJECTION, SOLUTION INTRAVENOUS
Status: ACTIVE | OUTPATIENT
Start: 2021-01-01 | End: 2021-01-01

## 2021-01-01 RX ORDER — ONDANSETRON 2 MG/ML
4 INJECTION INTRAMUSCULAR; INTRAVENOUS EVERY 4 HOURS PRN
Status: DISCONTINUED | OUTPATIENT
Start: 2021-01-01 | End: 2021-01-01 | Stop reason: HOSPADM

## 2021-01-01 RX ORDER — MORPHINE SULFATE 4 MG/ML
4 INJECTION, SOLUTION INTRAMUSCULAR; INTRAVENOUS ONCE
Status: COMPLETED | OUTPATIENT
Start: 2021-01-01 | End: 2021-01-01

## 2021-01-01 RX ORDER — HYDROCHLOROTHIAZIDE 12.5 MG/1
12.5 TABLET ORAL DAILY
Qty: 100 TAB | Refills: 3 | Status: SHIPPED | OUTPATIENT
Start: 2021-01-01 | End: 2021-01-01

## 2021-01-01 RX ORDER — HYDROCODONE BITARTRATE AND ACETAMINOPHEN 5; 325 MG/1; MG/1
1 TABLET ORAL EVERY 6 HOURS PRN
Status: DISCONTINUED | OUTPATIENT
Start: 2021-01-01 | End: 2021-01-01 | Stop reason: HOSPADM

## 2021-01-01 RX ORDER — HEPARIN SODIUM 1000 [USP'U]/ML
80 INJECTION, SOLUTION INTRAVENOUS; SUBCUTANEOUS ONCE
Status: COMPLETED | OUTPATIENT
Start: 2021-01-01 | End: 2021-01-01

## 2021-01-01 RX ORDER — OXYCODONE HYDROCHLORIDE 5 MG/1
5 TABLET ORAL
Status: DISPENSED | OUTPATIENT
Start: 2021-01-01 | End: 2021-01-01

## 2021-01-01 RX ORDER — HEPARIN SODIUM 1000 [USP'U]/ML
40 INJECTION, SOLUTION INTRAVENOUS; SUBCUTANEOUS PRN
Status: DISCONTINUED | OUTPATIENT
Start: 2021-01-01 | End: 2021-01-01 | Stop reason: HOSPADM

## 2021-01-01 RX ORDER — HYDROMORPHONE HYDROCHLORIDE 1 MG/ML
0.5 INJECTION, SOLUTION INTRAMUSCULAR; INTRAVENOUS; SUBCUTANEOUS
Status: ACTIVE | OUTPATIENT
Start: 2021-01-01 | End: 2021-01-01

## 2021-01-01 RX ORDER — HYDROCODONE BITARTRATE AND ACETAMINOPHEN 5; 325 MG/1; MG/1
1 TABLET ORAL EVERY 6 HOURS PRN
COMMUNITY

## 2021-01-01 RX ORDER — LORATADINE 10 MG/1
10 TABLET ORAL EVERY EVENING
Status: DISCONTINUED | OUTPATIENT
Start: 2021-01-01 | End: 2021-01-01 | Stop reason: HOSPADM

## 2021-01-01 RX ORDER — SPIRONOLACTONE 25 MG/1
25 TABLET ORAL DAILY
Qty: 90 TAB | Refills: 3 | Status: SHIPPED | OUTPATIENT
Start: 2021-01-01 | End: 2021-01-01

## 2021-01-01 RX ORDER — ONDANSETRON 2 MG/ML
4 INJECTION INTRAMUSCULAR; INTRAVENOUS ONCE
Status: COMPLETED | OUTPATIENT
Start: 2021-01-01 | End: 2021-01-01

## 2021-01-01 RX ORDER — OMEPRAZOLE 20 MG/1
40 CAPSULE, DELAYED RELEASE ORAL 2 TIMES DAILY
Status: DISCONTINUED | OUTPATIENT
Start: 2021-01-01 | End: 2021-01-01 | Stop reason: HOSPADM

## 2021-01-01 RX ORDER — LORATADINE 10 MG/1
10 TABLET ORAL EVERY EVENING
Status: SHIPPED | COMMUNITY
End: 2021-01-01

## 2021-01-01 RX ORDER — BISACODYL 10 MG
10 SUPPOSITORY, RECTAL RECTAL
Status: DISCONTINUED | OUTPATIENT
Start: 2021-01-01 | End: 2021-01-01 | Stop reason: HOSPADM

## 2021-01-01 RX ORDER — ONDANSETRON 4 MG/1
4 TABLET, ORALLY DISINTEGRATING ORAL EVERY 4 HOURS PRN
Status: DISCONTINUED | OUTPATIENT
Start: 2021-01-01 | End: 2021-01-01 | Stop reason: HOSPADM

## 2021-01-01 RX ORDER — NITROGLYCERIN 0.4 MG/1
0.4 TABLET SUBLINGUAL PRN
Status: DISCONTINUED | OUTPATIENT
Start: 2021-01-01 | End: 2021-01-01 | Stop reason: HOSPADM

## 2021-01-01 RX ORDER — OMEPRAZOLE 20 MG/1
20 CAPSULE, DELAYED RELEASE ORAL DAILY
Status: DISCONTINUED | OUTPATIENT
Start: 2021-01-01 | End: 2021-01-01

## 2021-01-01 RX ORDER — LOSARTAN POTASSIUM 50 MG/1
100 TABLET ORAL EVERY EVENING
Status: DISCONTINUED | OUTPATIENT
Start: 2021-01-01 | End: 2021-01-01 | Stop reason: HOSPADM

## 2021-01-01 RX ORDER — ONDANSETRON 4 MG/1
4 TABLET, ORALLY DISINTEGRATING ORAL EVERY 6 HOURS PRN
Status: ON HOLD | COMMUNITY
End: 2021-01-01

## 2021-01-01 RX ORDER — ACETAMINOPHEN 500 MG
1000 TABLET ORAL 3 TIMES DAILY
COMMUNITY

## 2021-01-01 RX ORDER — SODIUM CHLORIDE 9 MG/ML
INJECTION, SOLUTION INTRAVENOUS CONTINUOUS
Status: DISCONTINUED | OUTPATIENT
Start: 2021-01-01 | End: 2021-01-01 | Stop reason: HOSPADM

## 2021-01-01 RX ORDER — LEVOFLOXACIN 500 MG/1
500 TABLET, FILM COATED ORAL DAILY
Qty: 5 TABLET | Refills: 0 | Status: SHIPPED | OUTPATIENT
Start: 2021-01-01

## 2021-01-01 RX ORDER — AMLODIPINE BESYLATE 5 MG/1
5 TABLET ORAL DAILY
Status: DISCONTINUED | OUTPATIENT
Start: 2021-01-01 | End: 2021-01-01 | Stop reason: HOSPADM

## 2021-01-01 RX ORDER — ONDANSETRON 2 MG/ML
4 INJECTION INTRAMUSCULAR; INTRAVENOUS PRN
Status: ACTIVE | OUTPATIENT
Start: 2021-01-01 | End: 2021-01-01

## 2021-01-01 RX ORDER — OXYCODONE HYDROCHLORIDE 10 MG/1
10 TABLET ORAL
Status: ACTIVE | OUTPATIENT
Start: 2021-01-01 | End: 2021-01-01

## 2021-01-01 RX ORDER — HEPARIN SODIUM 5000 [USP'U]/100ML
0-30 INJECTION, SOLUTION INTRAVENOUS CONTINUOUS
Status: DISCONTINUED | OUTPATIENT
Start: 2021-01-01 | End: 2021-01-01 | Stop reason: HOSPADM

## 2021-01-01 RX ORDER — AMLODIPINE BESYLATE 5 MG/1
5 TABLET ORAL DAILY
Qty: 90 TABLET | Refills: 3 | Status: SHIPPED | OUTPATIENT
Start: 2021-01-01

## 2021-01-01 RX ORDER — POLYETHYLENE GLYCOL 3350 17 G/17G
1 POWDER, FOR SOLUTION ORAL
Status: DISCONTINUED | OUTPATIENT
Start: 2021-01-01 | End: 2021-01-01 | Stop reason: HOSPADM

## 2021-01-01 RX ORDER — AMOXICILLIN 250 MG
2 CAPSULE ORAL 2 TIMES DAILY
Status: DISCONTINUED | OUTPATIENT
Start: 2021-01-01 | End: 2021-01-01 | Stop reason: HOSPADM

## 2021-01-01 RX ORDER — MIDAZOLAM HYDROCHLORIDE 1 MG/ML
.5-2 INJECTION INTRAMUSCULAR; INTRAVENOUS PRN
Status: ACTIVE | OUTPATIENT
Start: 2021-01-01 | End: 2021-01-01

## 2021-01-01 RX ORDER — ACETAMINOPHEN 500 MG
1000 TABLET ORAL 3 TIMES DAILY
Status: DISCONTINUED | OUTPATIENT
Start: 2021-01-01 | End: 2021-01-01 | Stop reason: HOSPADM

## 2021-01-01 RX ADMIN — ATORVASTATIN CALCIUM 80 MG: 80 TABLET, FILM COATED ORAL at 17:18

## 2021-01-01 RX ADMIN — MIDAZOLAM HYDROCHLORIDE 1 MG: 1 INJECTION, SOLUTION INTRAMUSCULAR; INTRAVENOUS at 10:15

## 2021-01-01 RX ADMIN — METOPROLOL TARTRATE 12.5 MG: 25 TABLET, FILM COATED ORAL at 17:18

## 2021-01-01 RX ADMIN — METOPROLOL TARTRATE 12.5 MG: 25 TABLET, FILM COATED ORAL at 04:18

## 2021-01-01 RX ADMIN — IOHEXOL 60 ML: 350 INJECTION, SOLUTION INTRAVENOUS at 13:09

## 2021-01-01 RX ADMIN — FENTANYL CITRATE 25 MCG: 50 INJECTION, SOLUTION INTRAMUSCULAR; INTRAVENOUS at 09:54

## 2021-01-01 RX ADMIN — MAGNESIUM SULFATE IN WATER 4 G: 40 INJECTION, SOLUTION INTRAVENOUS at 08:19

## 2021-01-01 RX ADMIN — ACETAMINOPHEN 1000 MG: 500 TABLET, FILM COATED ORAL at 04:24

## 2021-01-01 RX ADMIN — HEPARIN SODIUM 20 UNITS/KG/HR: 5000 INJECTION, SOLUTION INTRAVENOUS; SUBCUTANEOUS at 16:48

## 2021-01-01 RX ADMIN — HEPARIN SODIUM 24 UNITS/KG/HR: 5000 INJECTION, SOLUTION INTRAVENOUS; SUBCUTANEOUS at 06:41

## 2021-01-01 RX ADMIN — ACETAMINOPHEN 1000 MG: 500 TABLET, FILM COATED ORAL at 08:19

## 2021-01-01 RX ADMIN — OMEPRAZOLE 40 MG: 20 CAPSULE, DELAYED RELEASE ORAL at 04:24

## 2021-01-01 RX ADMIN — LOSARTAN POTASSIUM 100 MG: 50 TABLET, FILM COATED ORAL at 17:19

## 2021-01-01 RX ADMIN — IOHEXOL 25 ML: 240 INJECTION, SOLUTION INTRATHECAL; INTRAVASCULAR; INTRAVENOUS; ORAL at 15:18

## 2021-01-01 RX ADMIN — MIDAZOLAM HYDROCHLORIDE 1 MG: 1 INJECTION, SOLUTION INTRAMUSCULAR; INTRAVENOUS at 09:49

## 2021-01-01 RX ADMIN — HEPARIN SODIUM 20 UNITS/KG/HR: 5000 INJECTION, SOLUTION INTRAVENOUS; SUBCUTANEOUS at 05:25

## 2021-01-01 RX ADMIN — ONDANSETRON 4 MG: 2 INJECTION INTRAMUSCULAR; INTRAVENOUS at 12:39

## 2021-01-01 RX ADMIN — OMEPRAZOLE 40 MG: 20 CAPSULE, DELAYED RELEASE ORAL at 17:19

## 2021-01-01 RX ADMIN — HYDROCODONE BITARTRATE AND ACETAMINOPHEN 1 TABLET: 5; 325 TABLET ORAL at 20:51

## 2021-01-01 RX ADMIN — HEPARIN SODIUM 18 UNITS/KG/HR: 5000 INJECTION, SOLUTION INTRAVENOUS; SUBCUTANEOUS at 13:56

## 2021-01-01 RX ADMIN — ATORVASTATIN CALCIUM 80 MG: 80 TABLET, FILM COATED ORAL at 17:19

## 2021-01-01 RX ADMIN — ONDANSETRON 4 MG: 4 TABLET, ORALLY DISINTEGRATING ORAL at 17:18

## 2021-01-01 RX ADMIN — ACETAMINOPHEN 1000 MG: 500 TABLET, FILM COATED ORAL at 17:18

## 2021-01-01 RX ADMIN — FENTANYL CITRATE 25 MCG: 50 INJECTION, SOLUTION INTRAMUSCULAR; INTRAVENOUS at 09:49

## 2021-01-01 RX ADMIN — FENTANYL CITRATE 50 MCG: 50 INJECTION, SOLUTION INTRAMUSCULAR; INTRAVENOUS at 10:15

## 2021-01-01 RX ADMIN — METOPROLOL TARTRATE 12.5 MG: 25 TABLET, FILM COATED ORAL at 04:24

## 2021-01-01 RX ADMIN — HYDROCODONE BITARTRATE AND ACETAMINOPHEN 1 TABLET: 5; 325 TABLET ORAL at 04:16

## 2021-01-01 RX ADMIN — HEPARIN SODIUM 3400 UNITS: 1000 INJECTION, SOLUTION INTRAVENOUS; SUBCUTANEOUS at 21:59

## 2021-01-01 RX ADMIN — SODIUM CHLORIDE: 9 INJECTION, SOLUTION INTRAVENOUS at 17:29

## 2021-01-01 RX ADMIN — OXYCODONE 5 MG: 5 TABLET ORAL at 18:55

## 2021-01-01 RX ADMIN — IOHEXOL 30 ML: 300 INJECTION, SOLUTION INTRAVENOUS at 10:39

## 2021-01-01 RX ADMIN — OXYCODONE 5 MG: 5 TABLET ORAL at 04:29

## 2021-01-01 RX ADMIN — HEPARIN SODIUM 24 UNITS/KG/HR: 5000 INJECTION, SOLUTION INTRAVENOUS; SUBCUTANEOUS at 01:08

## 2021-01-01 RX ADMIN — SODIUM CHLORIDE: 9 INJECTION, SOLUTION INTRAVENOUS at 17:15

## 2021-01-01 RX ADMIN — LORATADINE 10 MG: 10 TABLET ORAL at 17:20

## 2021-01-01 RX ADMIN — OMEPRAZOLE 40 MG: 20 CAPSULE, DELAYED RELEASE ORAL at 04:16

## 2021-01-01 RX ADMIN — AMLODIPINE BESYLATE 5 MG: 5 TABLET ORAL at 17:21

## 2021-01-01 RX ADMIN — LOSARTAN POTASSIUM 100 MG: 50 TABLET, FILM COATED ORAL at 17:18

## 2021-01-01 RX ADMIN — MORPHINE SULFATE 4 MG: 4 INJECTION INTRAVENOUS at 12:39

## 2021-01-01 RX ADMIN — CEFTRIAXONE SODIUM 2 G: 2 INJECTION, POWDER, FOR SOLUTION INTRAMUSCULAR; INTRAVENOUS at 16:30

## 2021-01-01 RX ADMIN — AMLODIPINE BESYLATE 5 MG: 5 TABLET ORAL at 04:19

## 2021-01-01 RX ADMIN — SODIUM CHLORIDE: 9 INJECTION, SOLUTION INTRAVENOUS at 02:15

## 2021-01-01 RX ADMIN — IOHEXOL 100 ML: 350 INJECTION, SOLUTION INTRAVENOUS at 15:19

## 2021-01-01 RX ADMIN — HEPARIN SODIUM 3400 UNITS: 1000 INJECTION, SOLUTION INTRAVENOUS; SUBCUTANEOUS at 01:10

## 2021-01-01 RX ADMIN — AMLODIPINE BESYLATE 5 MG: 5 TABLET ORAL at 04:24

## 2021-01-01 RX ADMIN — ACETAMINOPHEN 1000 MG: 500 TABLET, FILM COATED ORAL at 17:20

## 2021-01-01 RX ADMIN — HEPARIN SODIUM 6800 UNITS: 1000 INJECTION, SOLUTION INTRAVENOUS; SUBCUTANEOUS at 13:56

## 2021-01-01 ASSESSMENT — COGNITIVE AND FUNCTIONAL STATUS - GENERAL
MOBILITY SCORE: 22
DAILY ACTIVITIY SCORE: 24
SUGGESTED CMS G CODE MODIFIER DAILY ACTIVITY: CH
CLIMB 3 TO 5 STEPS WITH RAILING: A LITTLE
SUGGESTED CMS G CODE MODIFIER MOBILITY: CJ
DAILY ACTIVITIY SCORE: 24
MOBILITY SCORE: 24
WALKING IN HOSPITAL ROOM: A LITTLE
SUGGESTED CMS G CODE MODIFIER MOBILITY: CH
SUGGESTED CMS G CODE MODIFIER DAILY ACTIVITY: CH

## 2021-01-01 ASSESSMENT — ENCOUNTER SYMPTOMS
SENSORY CHANGE: 0
ORTHOPNEA: 0
BLOOD IN STOOL: 0
WHEEZING: 0
FALLS: 0
WEIGHT LOSS: 1
MYALGIAS: 0
VOMITING: 0
WHEEZING: 0
SPEECH CHANGE: 0
NERVOUS/ANXIOUS: 0
DEPRESSION: 0
BRUISES/BLEEDS EASILY: 0
DIARRHEA: 0
CHILLS: 0
MEMORY LOSS: 0
CONSTIPATION: 0
DEPRESSION: 0
NECK PAIN: 0
BRUISES/BLEEDS EASILY: 0
CHILLS: 0
MYALGIAS: 0
POLYDIPSIA: 0
NECK PAIN: 0
CLAUDICATION: 0
NERVOUS/ANXIOUS: 0
FOCAL WEAKNESS: 0
LOSS OF CONSCIOUSNESS: 0
CONSTIPATION: 0
PND: 0
TINGLING: 0
NAUSEA: 0
WEIGHT LOSS: 1
SPEECH CHANGE: 0
WEAKNESS: 0
CLAUDICATION: 0
DIARRHEA: 0
BACK PAIN: 0
DIAPHORESIS: 0
HEMOPTYSIS: 0
BLURRED VISION: 0
BLOOD IN STOOL: 0
PND: 0
INSOMNIA: 0
TREMORS: 0
SHORTNESS OF BREATH: 0
FOCAL WEAKNESS: 0
SEIZURES: 0
HALLUCINATIONS: 0
PHOTOPHOBIA: 0
SEIZURES: 0
HEARTBURN: 0
PHOTOPHOBIA: 0
DIAPHORESIS: 0
BLURRED VISION: 0
FEVER: 0
DOUBLE VISION: 0
NAUSEA: 0
LOSS OF CONSCIOUSNESS: 0
ABDOMINAL PAIN: 1
POLYDIPSIA: 0
FALLS: 0
SHORTNESS OF BREATH: 0
FEVER: 0
SPUTUM PRODUCTION: 0
FLANK PAIN: 0
SPUTUM PRODUCTION: 0
VOMITING: 0
HEMOPTYSIS: 0
SENSORY CHANGE: 0
FLANK PAIN: 0
BACK PAIN: 0
INSOMNIA: 0
PALPITATIONS: 0
ORTHOPNEA: 0
HEARTBURN: 0
ABDOMINAL PAIN: 1
COUGH: 0
HALLUCINATIONS: 0
WEAKNESS: 0
MEMORY LOSS: 0
PALPITATIONS: 0
COUGH: 0
TINGLING: 0
TREMORS: 0
HEADACHES: 0
DOUBLE VISION: 0
HEADACHES: 0

## 2021-01-01 ASSESSMENT — FIBROSIS 4 INDEX
FIB4 SCORE: 2.35
FIB4 SCORE: 2.38
FIB4 SCORE: 2.44
FIB4 SCORE: 2.97
FIB4 SCORE: 4.04
FIB4 SCORE: 3.34

## 2021-01-01 ASSESSMENT — LIFESTYLE VARIABLES
DO YOU DRINK ALCOHOL: NO
EVER FELT BAD OR GUILTY ABOUT YOUR DRINKING: NO
SUBSTANCE_ABUSE: 0
TOTAL SCORE: 0
TOTAL SCORE: 0
EVER HAD A DRINK FIRST THING IN THE MORNING TO STEADY YOUR NERVES TO GET RID OF A HANGOVER: NO
CONSUMPTION TOTAL: NEGATIVE
HAVE PEOPLE ANNOYED YOU BY CRITICIZING YOUR DRINKING: NO
HAVE YOU EVER FELT YOU SHOULD CUT DOWN ON YOUR DRINKING: NO
TOTAL SCORE: 0
HOW MANY TIMES IN THE PAST YEAR HAVE YOU HAD 5 OR MORE DRINKS IN A DAY: 0
ALCOHOL_USE: YES
ON A TYPICAL DAY WHEN YOU DRINK ALCOHOL HOW MANY DRINKS DO YOU HAVE: 1
AVERAGE NUMBER OF DAYS PER WEEK YOU HAVE A DRINK CONTAINING ALCOHOL: 4
SUBSTANCE_ABUSE: 0
DOES PATIENT WANT TO STOP DRINKING: NO

## 2021-01-01 ASSESSMENT — PAIN DESCRIPTION - PAIN TYPE
TYPE: ACUTE PAIN
TYPE: CHRONIC PAIN
TYPE: ACUTE PAIN
TYPE: CHRONIC PAIN
TYPE: ACUTE PAIN

## 2021-01-01 ASSESSMENT — COPD QUESTIONNAIRES
DO YOU EVER COUGH UP ANY MUCUS OR PHLEGM?: NO/ONLY WITH OCCASIONAL COLDS OR INFECTIONS
DURING THE PAST 4 WEEKS HOW MUCH DID YOU FEEL SHORT OF BREATH: SOME OF THE TIME
HAVE YOU SMOKED AT LEAST 100 CIGARETTES IN YOUR ENTIRE LIFE: YES
COPD SCREENING SCORE: 5

## 2021-01-01 ASSESSMENT — GAIT ASSESSMENTS
GAIT LEVEL OF ASSIST: SUPERVISED
DISTANCE (FEET): 400

## 2021-01-01 ASSESSMENT — ACTIVITIES OF DAILY LIVING (ADL): TOILETING: INDEPENDENT

## 2021-01-01 ASSESSMENT — PATIENT HEALTH QUESTIONNAIRE - PHQ9
1. LITTLE INTEREST OR PLEASURE IN DOING THINGS: NOT AT ALL
1. LITTLE INTEREST OR PLEASURE IN DOING THINGS: NOT AT ALL
SUM OF ALL RESPONSES TO PHQ9 QUESTIONS 1 AND 2: 0
SUM OF ALL RESPONSES TO PHQ9 QUESTIONS 1 AND 2: 0
2. FEELING DOWN, DEPRESSED, IRRITABLE, OR HOPELESS: NOT AT ALL

## 2021-01-01 ASSESSMENT — PAIN DESCRIPTION - DESCRIPTORS: DESCRIPTORS: ACHING

## 2021-01-12 NOTE — PATIENT INSTRUCTIONS
-I am concerned about the swelling in one leg, see if you can move up your appointment with Dr. Wills.  In the meantime, right lower extremity ultrasound to look for blood clot.    -Reduce your amlodipine from 7.5 mg to 2.5 mg once daily.  You can take your 2.5 mg tablets once a day and also cut your 5 mg in half to get to 2.5 mg.  I will send a new prescription but tell the pharmacy not to fill it until you requested.    -Start HCTZ 12.5 mg once daily in the morning.  This is a good blood pressure medicine, gets rid of salt and a little bit of water.  It is a mild diuretic.  If you are having any side effects such as lightheadedness or symptoms of dehydration, get a hold of me.    -Non fasting BMP (blood work) 2-4 weeks after the new medication

## 2021-01-12 NOTE — PROGRESS NOTES
Subjective:   Chief Complaint:   Chief Complaint   Patient presents with   • Coronary Artery Disease       Vinod Conrad is a 75 y.o. male who returns for further management of coronary artery disease/STEMI/stents, cardiac arrest, VT, hypertension, intolerance to some meds, GI bleed, and today new swelling in leg/knee/hip pain.    He had an anterior ST elevation MI August 15, 2018 and had a stent placed to the LAD (Ada, his wife's Bday). Was having arm and shoulder pain bilaterally for 2 days prior to presentation, finally brought to the ED, had cardiac arrest in the ED, went to the lab in arrest, had stent to LAD.  He had some minimal disease in the RCA but otherwise patent vessels, EF was 45% in the Cath Lab.   Reported Hx of VT, I am not certain of when.  No recurrence of VT during his stay or since.  Has been on BB, ASA, statin.  Has not needed nitro.    Came home, had dark stools a few days later, ended up with GI bleeding, back to the hospital, had ulcers that were clipped and started on PPI. He had had prior GI bleeding about 10 year prior, had abd surgery and still could not find the bleeding.  He completed dual antiplatelet therapy and now on aspirin alone.  No more bleeding, on PPI.    He was back in the emergency room 10/12/2018 with fluttering in his chest but all of his evaluation was reassuring.    Has HTN with mild LVH with some mild bradycardia.  His BP was elevated, we made more med changes and now it is controlled, he checks twice a week at home.    We switch metoprolol to carvedilol because of his low heart rate, he felt worse on carvedilol so he tried labetalol but he also did not feel well so he went back to metoprolol.  Coreg made him feel dizzy, scalp tingling.  Labetalol caused sleep disturbance and confusion.    ACE-I on allergy list.  With increased amlodipine, mild LE edema, now worse.  His BP has been up to 181 and even 200 in the morning but much better now.  Does not have  features of JOSHUA.  HR slow but no symptoms.  BP at home now mostly in the 120s now, cut back on salt.    LDL was 54, on Lipitor 80 mg.  LFTs normal.    Has impaired fasting glucose, hemoglobin A 1 C 6 0.3.    He rides an exercise bike every day, 6-7 miles, stationary, recumbant.     Today, has sign RLE edema, pain in knee and hip, no prior DVT.    He is not limited by chest pain, pressure or tightness.   No significant dyspnea on exertion, orthopnea.  No significant palpitations, dizziness, or presyncope/syncope.   No symptoms of leg claudication.   Rare pains, that are brief, took nitro once and didn't notice a difference, has not taken it since. Does have arthritidis pain in shoulders.    Here with Ada, his wife.  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    DATA REVIEWED by me:  ECG 10/12/2018  Sinus bradycardia, rate 41, anterior and lateral T wave inversion    Echo 8-  Prior echo 8/17/18, the LV EF has improved.  Left ventricular ejection fraction is visually estimated to be 65%.  Unable to estimate pulmonary artery pressure due to an inadequate   tricuspid regurgitant jet.    Echo 8/17/2018  Mild LVH, EF 45%, akinesis of the distal septum and apex, no significant valve disease, RVSP 33 mmHg above right atrial pressure    Left heart catheterization 8/15/2018  Proximal 100% LAD occlusion, status post Xience 3.0 x 38 mm drug-eluting stent, left main free of disease, circumflex normal, proximal mid RCA with diffuse calcified atheromatous disease but patent, EF 45% with anterior apical and inferior apical wall motion abnormality    Chest x-ray 10/12/2018  No acute process    Right lower extremity Doppler study 1/12/2021  Negative for DVT    Most recent labs:     Lab Results   Component Value Date/Time    HEMOGLOBIN 15.6 08/14/2020 10:00 AM    HEMATOCRIT 47.7 08/14/2020 10:00 AM    MCV 97.5 08/14/2020 10:00 AM    INR 1.17 (H) 10/12/2018 11:06 AM      Lab  "Results   Component Value Date/Time    SODIUM 139 08/14/2020 10:00 AM    POTASSIUM 3.9 08/14/2020 10:00 AM    CHLORIDE 101 08/14/2020 10:00 AM    CO2 25 08/14/2020 10:00 AM    GLUCOSE 124 (H) 08/14/2020 10:00 AM    BUN 17 08/14/2020 10:00 AM    CREATININE 0.70 08/14/2020 10:00 AM    CREATININE 1.0 01/12/2009 08:55 AM      Lab Results   Component Value Date/Time    ASTSGOT 33 08/14/2020 10:00 AM    ALTSGPT 32 08/14/2020 10:00 AM    ALBUMIN 4.7 08/14/2020 10:00 AM    ALBUMIN 4.38 02/28/2017 07:05 AM      Lab Results   Component Value Date/Time    CHOLSTRLTOT 131 08/14/2020 10:00 AM    LDL 54 08/14/2020 10:00 AM    HDL 61 08/14/2020 10:00 AM    TRIGLYCERIDE 80 08/14/2020 10:00 AM       7/1/2019 LDL 53, HDL 49, to glycerides 180, total cholesterol 124, hemoglobin A1c 6.3, hemoglobin 15.6, platelet 166, sodium 141, potassium 4, creatinine 0.78    1/24/2019 LFTs normal    10/12/2018  Hemoglobin 13.5, platelet 172, sodium 138, potassium 3.7, creatinine 0.83, LFTs normal, troponin normal    8-16-18  , , HDL 55, LDL 51    1/24/2019 hemoglobin 15.6, platelet 201, sodium 139, potassium 4.3, creatinine 0.75, LFTs normal, hemoglobin A1c 6.6, total cholesterol 123, triglycerides 88, HDL 54 1, LDL 54      Past Medical History:   Diagnosis Date   • Allergy    • Anesthesia 2006    Hallucinations post anesthesia, at home   • Arthritis     all joints   • CAD (coronary artery disease) 08/15/2018    PCI/ESTEFANY x 2 to the proximal mid LAD (Xience 3.0 x 38mm, 3.0 x 8mm). August 2020: Echocardiogram with normal LV size, LVEF 65%. Normal RA, LA and RV. Trace MR, trace TR.   • CATARACT     bilateral IOL   • Hyperlipidemia    • Hypertension    • Infectious disease 2011    C. Difficile   • Pain    • Unspecified hemorrhagic conditions 2006    \"abd bleeding\"     Past Surgical History:   Procedure Laterality Date   • GASTROSCOPY N/A 8/23/2018    Procedure: GASTROSCOPY;  Surgeon: Amari Juárez M.D.;  Location: SURGERY [facility]; "  Service: Gastroenterology   • CERVICAL FORAMINOTOMY  2013    Performed by Krish Yang M.D. at SURGERY [facility] ORS   • OTHER  2010    torn retina RIGHT EYE   • SCLERAL BUCKLING  2009    Performed by YVETTE DRAPER at SURGERY SAME DAY [facility] ORS   • OTHER ORTHOPEDIC SURGERY      l knee     Family History   Problem Relation Age of Onset   • Heart Failure Mother 78         at 78   • Heart Disease Brother 70        3 stents     Social History     Socioeconomic History   • Marital status:      Spouse name: Not on file   • Number of children: Not on file   • Years of education: Not on file   • Highest education level: Not on file   Occupational History   • Not on file   Social Needs   • Financial resource strain: Not on file   • Food insecurity     Worry: Not on file     Inability: Not on file   • Transportation needs     Medical: Not on file     Non-medical: Not on file   Tobacco Use   • Smoking status: Former Smoker     Packs/day: 2.00     Years: 30.00     Pack years: 60.00     Types: Cigarettes     Quit date: 1998     Years since quittin.0   • Smokeless tobacco: Never Used   Substance and Sexual Activity   • Alcohol use: Yes     Alcohol/week: 0.0 oz     Comment: 5 per week   • Drug use: No   • Sexual activity: Not on file   Lifestyle   • Physical activity     Days per week: Not on file     Minutes per session: Not on file   • Stress: Not on file   Relationships   • Social connections     Talks on phone: Not on file     Gets together: Not on file     Attends Mu-ism service: Not on file     Active member of club or organization: Not on file     Attends meetings of clubs or organizations: Not on file     Relationship status: Not on file   • Intimate partner violence     Fear of current or ex partner: Not on file     Emotionally abused: Not on file     Physically abused: Not on file     Forced sexual activity: Not on file   Other Topics Concern   • Not on file   Social  "History Narrative   • Not on file     Allergies   Allergen Reactions   • Aspirin      INTERNAL BLEEDING, 325 mg, pt reports he is ok to take ASPRIN 81MG only if he takes OMEPRAZOLE with his ASPIRIN   • Augmentin Vomiting   • Clindamycin      Develop c.diff   • Metronidazole      Not sure   • Nsaids Unspecified     GI bleed   • Vancomycin Swelling   • Ace Inhibitors Cough       Current Outpatient Medications   Medication Sig Dispense Refill   • amLODIPine (NORVASC) 2.5 MG Tab Take 1 Tab by mouth every day. 100 Tab 3   • hydroCHLOROthiazide (HYDRODIURIL) 12.5 MG tablet Take 1 Tab by mouth every day. 100 Tab 3   • tadalafil (CIALIS) 20 MG tablet Take 1 Tab by mouth 1 time daily as needed for Erectile Dysfunction. 20 Tab 5   • ipratropium (ATROVENT) 0.06 % Solution Spray 2 Sprays in nose 2 Times a Day. 45 mL 3   • atorvastatin (LIPITOR) 80 MG tablet Take 1 Tab by mouth every evening. 90 Tab 3   • metoprolol (LOPRESSOR) 25 MG Tab TAKE 1.5 TABS BY MOUTH 2 TIMES A DAY. 270 Tab 3   • omeprazole (PRILOSEC) 20 MG delayed-release capsule Take 1 Cap by mouth every day. 90 Cap 3   • losartan (COZAAR) 100 MG Tab Take 1 Tab by mouth every evening. 90 Tab 3   • Acetaminophen (TYLENOL PO) Take 3 Tabs by mouth as needed (For pain). Pt is not sure the strength     • aspirin EC 81 MG EC tablet Take 1 Tab by mouth every day. 30 Tab 3   • nitroglycerin (NITROSTAT) 0.4 MG SL Tab Place 1 Tab under tongue as needed for Chest Pain. Up to 3 every five minutes (Patient not taking: Reported on 1/12/2021) 25 Tab 11     No current facility-administered medications for this visit.        ROS    All others systems reviewed and negative.     Objective:     /80 (BP Location: Left arm, Patient Position: Sitting, BP Cuff Size: Adult)   Pulse 62   Ht 1.854 m (6' 1\")   Wt 91.6 kg (202 lb)   SpO2 97%  Body mass index is 26.65 kg/m².    Physical Exam   General: No acute distress. Well nourished.  HEENT: EOM grossly intact, no scleral icterus, no " pharyngeal erythema.   Neck:  No JVD, no bruits, trachea midline  CVS: Slow, regular, some ectopy. 1/6 BIRDIE RSB. 1+ left, 2-3+ right LE edema.  2+ radial pulses, 2+ DP pulses  Resp: CTAB. No wheezing or crackles/rhonchi. Normal respiratory effort.  Abdomen: Soft, NT, no aneudy hepatomegaly, well healed midline incision, calcified xyphoid process.  MSK/Ext: No clubbing or cyanosis.  Skin: Warm and dry, no rashes.  Neurological: CN III-XII grossly intact. No focal deficits.   Psych: A&O x 3, appropriate affect, good judgement    Physical exam performed today and unchanged, except what is noted, compared to 2-      Assessment:     1. Coronary artery disease involving native coronary artery of native heart without angina pectoris  Basic Metabolic Panel   2. S/P angioplasty with stent     3. Essential hypertension     4. Cardiac arrest (HCC)     5. Ventricular tachycardia (HCC)     6. Controlled type 2 diabetes mellitus without complication, without long-term current use of insulin (HCC)     7. Iron deficiency anemia due to chronic blood loss     8. Abnormal liver function     9. Mixed hyperlipidemia     10. Bradycardia     11. History of MI (myocardial infarction)     12. Erectile dysfunction due to arterial insufficiency     13. IFG (impaired fasting glucose)     14. LVH (left ventricular hypertrophy)     15. ST elevation myocardial infarction involving left anterior descending (LAD) coronary artery (HCC)     16. Edema of right lower extremity  US-EXTREMITY VENOUS LOWER UNILAT RIGHT   17. Acute pain of right knee  US-EXTREMITY VENOUS LOWER UNILAT RIGHT       Medical Decision Making:  Today's Assessment / Status / Plan:     -BP now at goal, doing well but I do not like the lower extremity edema.  I am going to reduce the amlodipine from 7.5-2.5 and add HCTZ 12.5.  -We will need eventual follow-up of potassium and kidney function, BMP 2-4 weeks.  -Monitor slow HR, no symptoms at present  -No sx of recurrent VT or  progression of CAD  -Cont PRN nitro but has not needed this and knows not to take cialis and nitro within 24 hours.  -If he develops DM2, then SGLT2-I at that time, still IFG at this point.  -He is ready for annual visits now overall but will call me with any side effects  -LDL at goal.  -Consider heart monitor to look for VT again for any change  -RTC 1 year but he is to call with any changes or if BP not to goal    Addendum: Doppler study negative for DVT, MyChart message sent.    Written instructions given today:    -I am concerned about the swelling in one leg, see if you can move up your appointment with Dr. Wills.  In the meantime, right lower extremity ultrasound to look for blood clot.    -Reduce your amlodipine from 7.5 mg to 2.5 mg once daily.  You can take your 2.5 mg tablets once a day and also cut your 5 mg in half to get to 2.5 mg.  I will send a new prescription but tell the pharmacy not to fill it until you requested.    -Start HCTZ 12.5 mg once daily in the morning.  This is a good blood pressure medicine, gets rid of salt and a little bit of water.  It is a mild diuretic.  If you are having any side effects such as lightheadedness or symptoms of dehydration, get a hold of me.    -Non fasting BMP (blood work) 2-4 weeks after the new medication      Return in about 1 year (around 1/12/2022).    It is my pleasure to participate in the care of Mr. Conrad.  Please do not hesitate to contact me with questions or concerns.    Sil Jacobo MD, [facility]  Cardiologist [facility]    Please note that this dictation was created using voice recognition software. I have made every reasonable attempt to correct obvious errors, but it is possible there are errors of grammar and possibly content that I did not discover before finalizing the note.

## 2021-01-12 NOTE — LETTER
[facility]   [address]   [address]  [place] [address]  Phone: [phone]  Fax: [phone]              Vinod Conrad  1945    Encounter Date: 1/12/2021    Sil Jacobo M.D.          PROGRESS NOTE:  Subjective:   Chief Complaint:   Chief Complaint   Patient presents with   • Coronary Artery Disease       Vinod Conrad is a 75 y.o. male who returns for further management of coronary artery disease/STEMI/stents, cardiac arrest, VT, hypertension, intolerance to some meds, GI bleed, and today new swelling in leg/knee/hip pain.    He had an anterior ST elevation MI August 15, 2018 and had a stent placed to the LAD (Ada, his wife's Bday). Was having arm and shoulder pain bilaterally for 2 days prior to presentation, finally brought to the ED, had cardiac arrest in the ED, went to the lab in arrest, had stent to LAD.  He had some minimal disease in the RCA but otherwise patent vessels, EF was 45% in the Cath Lab.   Reported Hx of VT, I am not certain of when.  No recurrence of VT during his stay or since.  Has been on BB, ASA, statin.  Has not needed nitro.    Came home, had dark stools a few days later, ended up with GI bleeding, back to the hospital, had ulcers that were clipped and started on PPI. He had had prior GI bleeding about 10 year prior, had abd surgery and still could not find the bleeding.  He completed dual antiplatelet therapy and now on aspirin alone.  No more bleeding, on PPI.    He was back in the emergency room 10/12/2018 with fluttering in his chest but all of his evaluation was reassuring.    Has HTN with mild LVH with some mild bradycardia.  His BP was elevated, we made more med changes and now it is controlled, he checks twice a week at home.    We switch metoprolol to carvedilol because of his low heart rate, he felt worse on carvedilol so he tried labetalol but he also did not feel well so he went  back to metoprolol.  Coreg made him feel dizzy, scalp tingling.  Labetalol caused sleep disturbance and confusion.    ACE-I on allergy list.  With increased amlodipine, mild LE edema, now worse.  His BP has been up to 181 and even 200 in the morning but much better now.  Does not have features of JOSHUA.  HR slow but no symptoms.  BP at home now mostly in the 120s now, cut back on salt.    LDL was 54, on Lipitor 80 mg.  LFTs normal.    Has impaired fasting glucose, hemoglobin A 1 C 6 0.3.    He rides an exercise bike every day, 6-7 miles, stationary, recumbant.     Today, has sign RLE edema, pain in knee and hip, no prior DVT.    He is not limited by chest pain, pressure or tightness.   No significant dyspnea on exertion, orthopnea.  No significant palpitations, dizziness, or presyncope/syncope.   No symptoms of leg claudication.   Rare pains, that are brief, took nitro once and didn't notice a difference, has not taken it since. Does have arthritidis pain in shoulders.    Here with Ada, his wife.  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    DATA REVIEWED by me:  ECG 10/12/2018  Sinus bradycardia, rate 41, anterior and lateral T wave inversion    Echo 8-  Prior echo 8/17/18, the LV EF has improved.  Left ventricular ejection fraction is visually estimated to be 65%.  Unable to estimate pulmonary artery pressure due to an inadequate   tricuspid regurgitant jet.    Echo 8/17/2018  Mild LVH, EF 45%, akinesis of the distal septum and apex, no significant valve disease, RVSP 33 mmHg above right atrial pressure    Left heart catheterization 8/15/2018  Proximal 100% LAD occlusion, status post Xience 3.0 x 38 mm drug-eluting stent, left main free of disease, circumflex normal, proximal mid RCA with diffuse calcified atheromatous disease but patent, EF 45% with anterior apical and inferior apical wall motion abnormality    Chest x-ray 10/12/2018  No acute  process      Most recent labs:     Lab Results   Component Value Date/Time    HEMOGLOBIN 15.6 08/14/2020 10:00 AM    HEMATOCRIT 47.7 08/14/2020 10:00 AM    MCV 97.5 08/14/2020 10:00 AM    INR 1.17 (H) 10/12/2018 11:06 AM      Lab Results   Component Value Date/Time    SODIUM 139 08/14/2020 10:00 AM    POTASSIUM 3.9 08/14/2020 10:00 AM    CHLORIDE 101 08/14/2020 10:00 AM    CO2 25 08/14/2020 10:00 AM    GLUCOSE 124 (H) 08/14/2020 10:00 AM    BUN 17 08/14/2020 10:00 AM    CREATININE 0.70 08/14/2020 10:00 AM    CREATININE 1.0 01/12/2009 08:55 AM      Lab Results   Component Value Date/Time    ASTSGOT 33 08/14/2020 10:00 AM    ALTSGPT 32 08/14/2020 10:00 AM    ALBUMIN 4.7 08/14/2020 10:00 AM    ALBUMIN 4.38 02/28/2017 07:05 AM      Lab Results   Component Value Date/Time    CHOLSTRLTOT 131 08/14/2020 10:00 AM    LDL 54 08/14/2020 10:00 AM    HDL 61 08/14/2020 10:00 AM    TRIGLYCERIDE 80 08/14/2020 10:00 AM       7/1/2019 LDL 53, HDL 49, to glycerides 180, total cholesterol 124, hemoglobin A1c 6.3, hemoglobin 15.6, platelet 166, sodium 141, potassium 4, creatinine 0.78    1/24/2019 LFTs normal    10/12/2018  Hemoglobin 13.5, platelet 172, sodium 138, potassium 3.7, creatinine 0.83, LFTs normal, troponin normal    8-16-18  , , HDL 55, LDL 51    1/24/2019 hemoglobin 15.6, platelet 201, sodium 139, potassium 4.3, creatinine 0.75, LFTs normal, hemoglobin A1c 6.6, total cholesterol 123, triglycerides 88, HDL 54 1, LDL 54      Past Medical History:   Diagnosis Date   • Allergy    • Anesthesia 2006    Hallucinations post anesthesia, at home   • Arthritis     all joints   • CAD (coronary artery disease) 08/15/2018    PCI/ESTEFANY x 2 to the proximal mid LAD (Xience 3.0 x 38mm, 3.0 x 8mm). August 2020: Echocardiogram with normal LV size, LVEF 65%. Normal RA, LA and RV. Trace MR, trace TR.   • CATARACT     bilateral IOL   • Hyperlipidemia    • Hypertension    • Infectious disease 2011    C. Difficile   • Pain    •  "Unspecified hemorrhagic conditions     \"abd bleeding\"     Past Surgical History:   Procedure Laterality Date   • GASTROSCOPY N/A 2018    Procedure: GASTROSCOPY;  Surgeon: Amari Juárez M.D.;  Location: SURGERY [facility];  Service: Gastroenterology   • CERVICAL FORAMINOTOMY  2013    Performed by Krish Yang M.D. at SURGERY [facility]   • OTHER      torn retina RIGHT EYE   • SCLERAL BUCKLING  2009    Performed by YVETTE DRAPER at SURGERY SAME DAY [facility] ORS   • OTHER ORTHOPEDIC SURGERY  2005    l knee     Family History   Problem Relation Age of Onset   • Heart Failure Mother 78         at 78   • Heart Disease Brother 70        3 stents     Social History     Socioeconomic History   • Marital status:      Spouse name: Not on file   • Number of children: Not on file   • Years of education: Not on file   • Highest education level: Not on file   Occupational History   • Not on file   Social Needs   • Financial resource strain: Not on file   • Food insecurity     Worry: Not on file     Inability: Not on file   • Transportation needs     Medical: Not on file     Non-medical: Not on file   Tobacco Use   • Smoking status: Former Smoker     Packs/day: 2.00     Years: 30.00     Pack years: 60.00     Types: Cigarettes     Quit date: 1998     Years since quittin.0   • Smokeless tobacco: Never Used   Substance and Sexual Activity   • Alcohol use: Yes     Alcohol/week: 0.0 oz     Comment: 5 per week   • Drug use: No   • Sexual activity: Not on file   Lifestyle   • Physical activity     Days per week: Not on file     Minutes per session: Not on file   • Stress: Not on file   Relationships   • Social connections     Talks on phone: Not on file     Gets together: Not on file     Attends [employer] service: Not on file     Active member of club or organization: Not on file     Attends meetings of clubs or organizations: Not on file     Relationship status: Not on file "   • Intimate partner violence     Fear of current or ex partner: Not on file     Emotionally abused: Not on file     Physically abused: Not on file     Forced sexual activity: Not on file   Other Topics Concern   • Not on file   Social History Narrative   • Not on file     Allergies   Allergen Reactions   • Aspirin      INTERNAL BLEEDING, 325 mg, pt reports he is ok to take ASPRIN 81MG only if he takes OMEPRAZOLE with his ASPIRIN   • Augmentin Vomiting   • Clindamycin      Develop c.diff   • Metronidazole      Not sure   • Nsaids Unspecified     GI bleed   • Vancomycin Swelling   • Ace Inhibitors Cough       Current Outpatient Medications   Medication Sig Dispense Refill   • amLODIPine (NORVASC) 2.5 MG Tab Take 1 Tab by mouth every day. 100 Tab 3   • hydroCHLOROthiazide (HYDRODIURIL) 12.5 MG tablet Take 1 Tab by mouth every day. 100 Tab 3   • tadalafil (CIALIS) 20 MG tablet Take 1 Tab by mouth 1 time daily as needed for Erectile Dysfunction. 20 Tab 5   • ipratropium (ATROVENT) 0.06 % Solution Spray 2 Sprays in nose 2 Times a Day. 45 mL 3   • atorvastatin (LIPITOR) 80 MG tablet Take 1 Tab by mouth every evening. 90 Tab 3   • metoprolol (LOPRESSOR) 25 MG Tab TAKE 1.5 TABS BY MOUTH 2 TIMES A DAY. 270 Tab 3   • omeprazole (PRILOSEC) 20 MG delayed-release capsule Take 1 Cap by mouth every day. 90 Cap 3   • losartan (COZAAR) 100 MG Tab Take 1 Tab by mouth every evening. 90 Tab 3   • Acetaminophen (TYLENOL PO) Take 3 Tabs by mouth as needed (For pain). Pt is not sure the strength     • aspirin EC 81 MG EC tablet Take 1 Tab by mouth every day. 30 Tab 3   • nitroglycerin (NITROSTAT) 0.4 MG SL Tab Place 1 Tab under tongue as needed for Chest Pain. Up to 3 every five minutes (Patient not taking: Reported on 1/12/2021) 25 Tab 11     No current facility-administered medications for this visit.        ROS    All others systems reviewed and negative.     Objective:     /80 (BP Location: Left arm, Patient Position: Sitting,  "BP Cuff Size: Adult)   Pulse 62   Ht 1.854 m (6' 1\")   Wt 91.6 kg (202 lb)   SpO2 97%  Body mass index is 26.65 kg/m².    Physical Exam   General: No acute distress. Well nourished.  HEENT: EOM grossly intact, no scleral icterus, no pharyngeal erythema.   Neck:  No JVD, no bruits, trachea midline  CVS: Slow, regular. 1/6 BIRDIE RSB. No LE edema.  2+ radial pulses, 2+ DP pulses  Resp: CTAB. No wheezing or crackles/rhonchi. Normal respiratory effort.  Abdomen: Soft, NT, no aneudy hepatomegaly, well healed midline incision, calcified xyphoid process.  MSK/Ext: No clubbing or cyanosis.  Skin: Warm and dry, no rashes.  Neurological: CN III-XII grossly intact. No focal deficits.   Psych: A&O x 3, appropriate affect, good judgement    Physical exam performed today and unchanged, except what is noted, compared to 2-      Assessment:     1. Coronary artery disease involving native coronary artery of native heart without angina pectoris  Basic Metabolic Panel   2. S/P angioplasty with stent     3. Essential hypertension     4. Cardiac arrest (HCC)     5. Ventricular tachycardia (HCC)     6. Controlled type 2 diabetes mellitus without complication, without long-term current use of insulin (HCC)     7. Iron deficiency anemia due to chronic blood loss     8. Abnormal liver function     9. Mixed hyperlipidemia     10. Bradycardia     11. History of MI (myocardial infarction)     12. Erectile dysfunction due to arterial insufficiency     13. IFG (impaired fasting glucose)     14. LVH (left ventricular hypertrophy)     15. ST elevation myocardial infarction involving left anterior descending (LAD) coronary artery (HCC)     16. Edema of right lower extremity  US-EXTREMITY VENOUS LOWER UNILAT RIGHT   17. Acute pain of right knee  US-EXTREMITY VENOUS LOWER UNILAT RIGHT       Medical Decision Making:  Today's Assessment / Status / Plan:     -BP now at goal, doing well but I do not like the lower extremity edema.  I am going to " reduce the amlodipine from 7.5-2.5 and add HCTZ 12.5.  -We will need eventual follow-up of potassium and kidney function, BMP 2-4 weeks.  -Monitor slow HR, no symptoms at present  -No sx of recurrent VT or progression of CAD  -Cont PRN nitro but has not needed this and knows not to take cialis and nitro within 24 hours.  -If he develops DM2, then SGLT2-I at that time, still IFG at this point.  -He is ready for annual visits now overall but will call me with any side effects  -LDL at goal.  -RTC 1 year but he is to call with any changes or if BP not to goal    Written instructions given today:    -I am concerned about the swelling in one leg, see if you can move up your appointment with Dr. Wills.  In the meantime, right lower extremity ultrasound to look for blood clot.    -Reduce your amlodipine from 7.5 mg to 2.5 mg once daily.  You can take your 2.5 mg tablets once a day and also cut your 5 mg in half to get to 2.5 mg.  I will send a new prescription but tell the pharmacy not to fill it until you requested.    -Start HCTZ 12.5 mg once daily in the morning.  This is a good blood pressure medicine, gets rid of salt and a little bit of water.  It is a mild diuretic.  If you are having any side effects such as lightheadedness or symptoms of dehydration, get a hold of me.    -Non fasting BMP (blood work) 2-4 weeks after the new medication      Return in about 1 year (around 1/12/2022).    It is my pleasure to participate in the care of Mr. Conrad.  Please do not hesitate to contact me with questions or concerns.    Sil Jacobo MD, [facility]  Cardiologist [facility]    Please note that this dictation was created using voice recognition software. I have made every reasonable attempt to correct obvious errors, but it is possible there are errors of grammar and possibly content that I did not discover before finalizing the note.        Reid Wills M.D.  [address]  [address]  [place] [address]  Via In Basket

## 2021-01-21 NOTE — PROGRESS NOTES
Change HCTZ 12.5 to spironolactone 25 mg.    Request nonfasting blood work 1 to 2 weeks after the medication change to monitor potassium and kidney function.    Spironolactone should be better at blood pressure management.  Thank you.

## 2021-01-21 NOTE — PATIENT COMMUNICATION
Called pt. Pt states LS already placed lab order on 1/12 and he made appt for that on 2/1, he wanted to ensure that this timeframe is ok w/ new diuretic, informed him yes. He will start spironolactone tomorrow and let us know of any concerns.

## 2021-01-26 NOTE — PROGRESS NOTES
And try to keep track of the medications he did not like, did not like carvedilol or labetalol.    I do not think he is try losartan so if he is willing, losartan 25 mg once daily.    He could also first increase his amlodipine from 2.5 back to 5 mg but not all the way to 7.5.    Thank you.

## 2021-03-01 NOTE — TELEPHONE ENCOUNTER
LS    Pt called to discuss Rx amLODIPine (NORVASC) 2.5 MG Tab. Pt states that he was never aware of this dosage not the Rx being sent out and has still been taking the 5mg tabs. Pt states that he didn't realize that this Rx came in 2.5mg tabs as well and might be easier to have 2.5, 2 times a day instead of splitting. Pt states that he is now almost out and would like a refill sent to [facility] pharmacy on [address] [address] [address] in [place].   Pt also states that over the last couple days that his BP has slowly started getting higher. Pt says it happens in the morning and night when it gets elevated.   Pt is requesting a call back at [phone].    Thank you

## 2021-03-01 NOTE — TELEPHONE ENCOUNTER
Called pt per his request through [facility]. We discussed that pt should be taking amlodipine 5 mg and informed him I sent the rx in.     Pt reports when he was taking 7.5mg from aug - jan his average BP was 125/69. He went to 5 mg on Jan 13th and his weekly BP averages have been 134/83, 136/77, 129/74. When he called in earlier, he said his BP was becoming more elevated, but after he called he looked at his BP log and did the averages and found that it is not much of a difference than before.     I informed pt that ideally we like to keep BP < 130/80 consistently, if possible, and based on averages he is borderline to that goal. I advised him to take BP two hours after meds for one week and then report the numbers back to us to see if he needs any medication changes, the pt will do this.

## 2021-03-01 NOTE — TELEPHONE ENCOUNTER
On 1/26/20, it was discussed with the pt that he would increase to 5 mg of amlodipine daily. Old rx for 2.5 mg still on file. rx updated and sent in for 5 mg tablets. Pt notified via [employer].

## 2021-03-16 NOTE — ED NOTES
Med rec updated and complete  Allergies reviewed  Interviewed pt with wife at bedside with permission from pt.  Pt reports no vitamins  Pt reports no antibiotics in the last 2 weeks

## 2021-03-16 NOTE — ED PROVIDER NOTES
"CHIEF COMPLAINT  Chief Complaint   Patient presents with   • Abdominal Pain       HPI  Vinod Conrad is a 76 y.o. male who presents to the emergency department through triage with his wife for abdominal pain.  Patient points to the general mid and upper abdomen, describes pain increased from baseline since last night.  Aching, constant discomfort.  No nausea, vomiting or diarrhea.  No bloody stools.  Normal bowel movement this morning.  No fever or chills.  Decreased appetite, early satiety but tolerating food and fluids.    Patient with some months worth of abdominal pain, recently diagnosed with pancreatic mass after outpatient work-up with primary care.  Patient has an appointment this afternoon with oncology but became concerned with increasing abdominal pain overnight.  He was previously warned about possible \"biliary obstruction,\" and wanted to follow-up.  Pain medication was phoned in by primary care yesterday, single dose this morning was ineffective.    REVIEW OF SYSTEMS  See HPI for further details. All other systems are negative.     PAST MEDICAL HISTORY   has a past medical history of Allergy, Anesthesia (), Arthritis, CAD (coronary artery disease) (08/15/2018), CATARACT, Hyperlipidemia, Hypertension, Infectious disease (), Pain, and Unspecified hemorrhagic conditions ().    SOCIAL HISTORY  Social History     Tobacco Use   • Smoking status: Former Smoker     Packs/day: 2.00     Years: 30.00     Pack years: 60.00     Types: Cigarettes     Quit date: 1998     Years since quittin.2   • Smokeless tobacco: Never Used   Substance and Sexual Activity   • Alcohol use: Yes     Alcohol/week: 0.0 oz     Comment: 5 per week   • Drug use: No   • Sexual activity: Not on file       SURGICAL HISTORY   has a past surgical history that includes scleral buckling (2009); other orthopedic surgery (); other (); cervical foraminotomy (2013); and gastroscopy (N/A, " "8/23/2018).    CURRENT MEDICATIONS  Home Medications     Reviewed by Tania Rice (Pharmacy Tech) on 03/16/21 at 1218  Med List Status: Complete   Medication Last Dose Status   acetaminophen (TYLENOL) 500 MG Tab 3/15/2021 Active   amLODIPine (NORVASC) 5 MG Tab 3/15/2021 Active   aspirin EC 81 MG EC tablet 3/16/2021 Active   atorvastatin (LIPITOR) 80 MG tablet 3/15/2021 Active   HYDROcodone-acetaminophen (NORCO) 5-325 MG Tab per tablet 3/16/2021 Active   ipratropium (ATROVENT) 0.06 % Solution > 2 days Active   loratadine (CLARITIN) 10 MG Tab 3/15/2021 Active   losartan (COZAAR) 100 MG Tab 3/15/2021 Active   metoprolol (LOPRESSOR) 25 MG Tab 3/16/2021 Active   nitroglycerin (NITROSTAT) 0.4 MG SL Tab Not Taking Active   omeprazole (PRILOSEC) 20 MG delayed-release capsule 3/16/2021 Active   ondansetron (ZOFRAN ODT) 4 MG TABLET DISPERSIBLE 3/16/2021 Active   spironolactone (ALDACTONE) 25 MG Tab Not Taking Active   tadalafil (CIALIS) 20 MG tablet > 2 weeks Active                ALLERGIES  Allergies   Allergen Reactions   • Aspirin      INTERNAL BLEEDING, 325 mg, pt reports he is ok to take ASPRIN 81MG only if he takes OMEPRAZOLE with his ASPIRIN   • Augmentin Vomiting   • Clindamycin      Develop c.diff   • Metronidazole      Not sure   • Nsaids Unspecified     GI bleed   • Vancomycin Swelling   • Ace Inhibitors Cough       PHYSICAL EXAM  VITAL SIGNS: /74   Pulse 61   Temp 36.1 °C (97 °F) (Temporal)   Resp 16   Ht 1.854 m (6' 1\")   Wt 85.2 kg (187 lb 13.3 oz)   SpO2 99%   BMI 24.78 kg/m²   Pulse ox interpretation: I interpret this pulse ox as normal.  Constitutional: Alert in no apparent distress.  HENT: Normocephalic, atraumatic. Bilateral external ears normal, Nose normal.   Eyes: Pupils are equal and reactive, Conjunctiva normal.   Neck: Normal range of motion, Supple  Lymphatic: No lymphadenopathy noted.   Cardiovascular: Regular rate and rhythm, no murmurs. Distal pulses intact.   Thorax & " Lungs: Normal breath sounds.  No wheezing/rales/ronchi. No increased work of breathing  Abdomen: Soft, non-distended.  Mild generalized discomfort, greatest across the upper abdomen without rebound, guarding or peritonitis.  Skin: Warm, Dry, No erythema, No rash.   Musculoskeletal: Good range of motion in all major joints.   Neurologic: Alert and orient x4.  Speech clear and cohesive.  Psychiatric: Affect normal, Judgment normal, Mood normal.       DIAGNOSTIC STUDIES / PROCEDURES    LABS  Results for orders placed or performed during the hospital encounter of 03/16/21   CBC WITH DIFFERENTIAL   Result Value Ref Range    WBC 17.2 (H) 4.8 - 10.8 K/uL    RBC 4.78 4.70 - 6.10 M/uL    Hemoglobin 13.6 (L) 14.0 - 18.0 g/dL    Hematocrit 42.0 42.0 - 52.0 %    MCV 87.9 81.4 - 97.8 fL    MCH 28.5 27.0 - 33.0 pg    MCHC 32.4 (L) 33.7 - 35.3 g/dL    RDW 40.2 35.9 - 50.0 fL    Platelet Count 217 164 - 446 K/uL    MPV 11.7 9.0 - 12.9 fL    Neutrophils-Polys 73.70 (H) 44.00 - 72.00 %    Lymphocytes 13.10 (L) 22.00 - 41.00 %    Monocytes 9.60 0.00 - 13.40 %    Eosinophils 2.50 0.00 - 6.90 %    Basophils 0.60 0.00 - 1.80 %    Immature Granulocytes 0.50 0.00 - 0.90 %    Nucleated RBC 0.00 /100 WBC    Neutrophils (Absolute) 12.67 (H) 1.82 - 7.42 K/uL    Lymphs (Absolute) 2.26 1.00 - 4.80 K/uL    Monos (Absolute) 1.65 (H) 0.00 - 0.85 K/uL    Eos (Absolute) 0.43 0.00 - 0.51 K/uL    Baso (Absolute) 0.10 0.00 - 0.12 K/uL    Immature Granulocytes (abs) 0.09 0.00 - 0.11 K/uL    NRBC (Absolute) 0.00 K/uL   COMP METABOLIC PANEL   Result Value Ref Range    Sodium 134 (L) 135 - 145 mmol/L    Potassium 3.8 3.6 - 5.5 mmol/L    Chloride 97 96 - 112 mmol/L    Co2 23 20 - 33 mmol/L    Anion Gap 14.0 7.0 - 16.0    Glucose 247 (H) 65 - 99 mg/dL    Bun 12 8 - 22 mg/dL    Creatinine 0.63 0.50 - 1.40 mg/dL    Calcium 8.7 8.4 - 10.2 mg/dL    AST(SGOT) 37 12 - 45 U/L    ALT(SGPT) 29 2 - 50 U/L    Alkaline Phosphatase 334 (H) 30 - 99 U/L    Total Bilirubin  1.0 0.1 - 1.5 mg/dL    Albumin 3.0 (L) 3.2 - 4.9 g/dL    Total Protein 6.4 6.0 - 8.2 g/dL    Globulin 3.4 1.9 - 3.5 g/dL    A-G Ratio 0.9 g/dL   LIPASE   Result Value Ref Range    Lipase 17 7 - 58 U/L   ESTIMATED GFR   Result Value Ref Range    GFR If African American >60 >60 mL/min/1.73 m 2    GFR If Non African American >60 >60 mL/min/1.73 m 2     RADIOLOGY  CT-ABDOMEN-PELVIS WITH   Final Result      No evidence of bowel obstruction.      Pancreas mass consistent with pancreas cancer again noted with tumor invasion/tumor thrombosis in the main portal vein/SMV confluence again noted.      Diffuse hepatic metastases.      Lymphadenopathy.      Diverticulosis without evidence of diverticulitis.      Cardiomegaly with coronary artery calcifications.          COURSE & MEDICAL DECISION MAKING  Nursing notes and vital signs were reviewed. (See chart for details)  The patients  records were reviewed, history was obtained from the patient and his wife;    ED evaluation for worsening abdominal pain is unrevealing.  Labs have a nonspecific leukocytosis, mild leftward shift, no bandemia.  No electrolyte derangement.  Mild hyperglycemia without DKA.  Normal LFTs, nonspecific single elevation of alk phos, with normal AST, ALT, total bilirubin and lipase.  CT unchanged from previous, no evidence for bowel obstruction.  He does have invasive tumor with thrombosis of the main portal vein but there is no laboratory evidence for biliary obstruction at this time.  Pain is controlled with single dose of morphine.  He is hemodynamically stable.    Work-up today appears to be acute on chronic with recent diagnosis of a pancreatic mass.  He will follow up with oncology, , as scheduled after discharge from here.    Patient is stable for discharge at this time, anticipatory guidance provided, close follow-up is encouraged, and strict ED return instructions have been detailed. Patient is agreeable to the disposition and  plan.    Patient's blood pressure was elevated in the emergency department, and has been referred to primary care for close monitoring.  FINAL IMPRESSION  (R10.84) Generalized abdominal pain  (K86.89) Pancreatic mass      Electronically signed by: July Juárez D.O., 3/16/2021 2:34 PM      This dictation was created using voice recognition software. The accuracy of the dictation is limited to the abilities of the software. I expect there may be some errors of grammar and possibly content. The nursing notes were reviewed and certain aspects of this information were incorporated into this note.

## 2021-03-16 NOTE — ED TRIAGE NOTES
Chief Complaint   Patient presents with   • Abdominal Pain      pt complains of severe abdominal pain worse last night. Pt recently diagnosed with pancreatic cancer.

## 2021-03-16 NOTE — DISCHARGE INSTRUCTIONS
Follow-up with Dr. Zhang as scheduled this afternoon for evaluation and planning regarding newly diagnosed pancreatic mass.    Continue home medications as previously prescribed, including those for pain.Dr. Zhang can change or add additional medications for breakthrough pain.    Diet as tolerated.    Return to the emergency department for persistent or worsening abdominal pain, distention, fever, vomiting, bleeding or other new concerns.

## 2021-03-19 PROBLEM — I26.99 ACUTE PULMONARY EMBOLISM (HCC): Status: ACTIVE | Noted: 2021-01-01

## 2021-03-19 PROBLEM — R73.9 ELEVATED SERUM GLUCOSE: Status: ACTIVE | Noted: 2021-01-01

## 2021-03-19 PROBLEM — C78.7 CARCINOMA OF PANCREAS METASTATIC TO LIVER (HCC): Status: ACTIVE | Noted: 2021-01-01

## 2021-03-19 PROBLEM — C25.9 CARCINOMA OF PANCREAS METASTATIC TO LIVER (HCC): Status: ACTIVE | Noted: 2021-01-01

## 2021-03-19 PROBLEM — D64.9 ANEMIA: Status: ACTIVE | Noted: 2021-01-01

## 2021-03-19 NOTE — SENIOR ADMIT NOTE
"Senior Admit Note                              Chief complaint: Sent by oncology office for newly diagnosed bilateral pulmonary emboli on screening CT.    Brief HPI:  Vinod Conrad is a 76 y.o. male with Hx of degenerative joint disease, hypertension, dyslipidemia, retinal detachment, history of C. difficile, coronary artery disease with myocardial infarction in 2018 followed by drug-eluting stent, congestive heart failure with last echo ejection fraction 65%, remote history of gastrointestinal bleed with laparotomy, torsades in 2018, alcohol and tobacco dependence, no family history of cancers who presents on 3/19/2021 after being sent from the oncology office due to evidence of bilateral pulmonary emboli seen on screening CT.    Patient denied any chest pain, shortness of breath, hospitalizations or dizziness.  Reported he feels no different than any other day.  The patient has had progressively worsening fatigue since January along with the constant upper abdominal dull pain without any nausea or vomiting.  He does report of 15 pound weight loss in the last 1 month along with decreased appetite.  Denies any hematochezia hematemesis or hemoptysis.        In the ED, vitals were stable except for tachypnea once 22 and requiring up to 2 L of supplemental oxygen.  Not on home oxygen.  CBC chronic leukocytosis, CMP Hyponatremia, hyperglycemia 247--->187, elevated ALP  CXR pending  EKG pending  ECHO penidng    On exam:     Vitals:    03/19/21 1402 03/19/21 1412 03/19/21 1501 03/19/21 1601   BP: 150/66  143/68 136/67   Pulse:  69 79 79   Resp:  (!) 22 (!) 24 18   Temp:       TempSrc:       SpO2:  97% 98% 97%   Weight:       Height:         Body mass index is 24.78 kg/m².  /67   Pulse 79   Temp 37.1 °C (98.7 °F) (Temporal)   Resp 18   Ht 1.854 m (6' 1\")   Wt 85.2 kg (187 lb 13.3 oz)   SpO2 97%   BMI 24.78 kg/m²   O2 therapy: Pulse Oximetry: 97 %, O2 (LPM): 2, O2 Delivery Device: Nasal " Cannula    Gen/Neuro:Ill appearing pale elderly male NAD, Moving all extremities, no focal deficits,A&Ox3  Heart/Lungs: RRR, no MGR, CTAB  Abdo/Pelvis: Soft  Mild distension NT, bowel sounds heard in all four quadrants  Skin/Ext: No rashes/erythema. 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  Discontinue hydrochlorothiazide and spironolactone as no evidence of heart failure   PT and OT  Guarded prognosis with the extent of metastatic carcinoma.      DVT prophylaxis: Heparin  Code status: DNR/DNI    For complete details, please refer to H&P by Dr. Feroz Leal M.D.

## 2021-03-19 NOTE — ASSESSMENT & PLAN NOTE
Patient had abdominal pain, fatigue, and decrease appetite in Jan 2021 but was recently diagnosed Stage 4 pancreatic cancer (03/09 and mets to the liver), follows outpatient with Dr. Zhang and was  scheduled for a  Liver biopsy 03/23/2021. CA 19-9 elevated at 391 and CEA elevated at 370. Patient was started on heparin drip 03/19/2021.     Plan:  - Consult Oncology, greatly appreciate recommendations  - Con't Heparin drip and SCDs, will wait for input from Oncology whether they want to still do the Liver biopsy and hold Heparin inpatient (hold heparin 4 hours prior to biopsy)  - Diabetic diet  - Fall, aspiration, and seizure precautions  - Con't Telemetry  - Con't all home medications except aspirin, HCTZ, and Aldactone  - Con't home Norco and Tylenol PRN for breakthrough pain  - DNR/DNI

## 2021-03-19 NOTE — ASSESSMENT & PLAN NOTE
3/19/2021  outside CT scan revealed bilateral pulmonary embolisms, scans but the not the formal report are in the chart.  Patient was started on heparin drip in the ED and patient is on 2L NC (baseline no home oxygen).  ECG 03/19: normal sinus rhythm  TTE 03/19: 65% EF and right ventricular systolic pressure  is 36 mmHg (likely secondary to B/L PEs)    Plan:  - Con't telemetry  - Con't Heparin drip and SCDs  - 03/20: IVC Filter placed and Liver biopsy done, pending result  - Diabetic diet  - Fall, aspiration, and seizure precautions  - Con't all home medications except aspirin, HCTZ, and Aldactone  - 40mg BID Omeprazole PO  - DNR/DNI  - PT/OT and Respiratory therapy   - Pulse Ox

## 2021-03-19 NOTE — ASSESSMENT & PLAN NOTE
On admission glucose 241 and later decreased to 184. Patient states he does not take any medications for diabetes at home and has never been diagnosed. Per chart review, last A1C 08/2020 qas 6.5% and the highest it has been in the past on file was 7.0. Patient denies any increase in sugary intake recently.   9.6 A1C 03/19/2021, but blood glucose 178. Etiology likely due to Stage 4 Pancreatic Cancer.    Plan:  - Diabetic diet  - Will consider adding SSI and hypoglycemia protocol pending AM labs as patient is currently already at inpatient goal of 140-180

## 2021-03-19 NOTE — ASSESSMENT & PLAN NOTE
On admission, hemoglobin 12.0 (baseline 14-15) and MCV 89.1.    Plan:  - 40mg BID Omeprazole PO  - Reticulocyte count, TSH/T4, Folate/B12, and Occult blood ordered  - Con't to monitor

## 2021-03-19 NOTE — ED TRIAGE NOTES
Vinod Conrad  76 y.o.  Chief Complaint   Patient presents with   • Sent by MD zhu BIBA s/p CTA which showed multiple PEs; pt has known hx of metastatic pancreatic CA; pt currently denies SOB or CP; pt noted to be 87% on RA; placed on 2 L O2 via NC; now 95%

## 2021-03-19 NOTE — ED NOTES
Med rec updated and complete. Allergies reviewed. Met with pt/family at bedside. No antibiotic use  In last 14 days.       Home pharmacy [facility] [phone]

## 2021-03-19 NOTE — ED PROVIDER NOTES
"ED Provider Note    CHIEF COMPLAINT  Chief Complaint   Patient presents with    Sent by MD zhu BIBA s/p CTA which showed multiple PEs; pt has known hx of metastatic pancreatic CA; pt currently denies SOB or CP; pt noted to be 87% on RA; placed on 2 L O2 via NC; now 95%       HPI  Vinod Conrad is a 76 y.o. male who presents via ambulance for evaluation of new PEs, was diagnosed with metastatic pancreatic cancer less than 2 weeks ago, was undergoing a CT of his chest today to rule out pulmonary metastasis when the CT revealed pulmonary emboli.  Sent here by the oncology team.  He states that he generally does not feel ill, has had a very mild cough, not been having chest pain or shortness of breath.  He denies fever.  He has had some abnormal leg pain and swelling recently but has been under the care of cardiology team and has had negative duplex studies.  No history of DVT or PE.  Past medical history is significant for coronary disease with stents and congestive heart failure, hyperlipidemia and hypertension.     REVIEW OF SYSTEMS  Negative for fever, rash, chest pain, dyspnea, abdominal pain, nausea, vomiting, diarrhea, headache, back pain. All other systems are negative.     PAST MEDICAL HISTORY  Past Medical History:   Diagnosis Date    Allergy     Anesthesia     Hallucinations post anesthesia, at home    Arthritis     all joints    CAD (coronary artery disease) 08/15/2018    PCI/ESTEFANY x 2 to the proximal mid LAD (Xience 3.0 x 38mm, 3.0 x 8mm). 2020: Echocardiogram with normal LV size, LVEF 65%. Normal RA, LA and RV. Trace MR, trace TR.    CATARACT     bilateral IOL    Hyperlipidemia     Hypertension     Infectious disease     C. Difficile    Pain     Unspecified hemorrhagic conditions     \"abd bleeding\"       FAMILY HISTORY  Family History   Problem Relation Age of Onset    Heart Failure Mother 78         at 78    Heart Disease Brother 70        3 stents       SOCIAL " "HISTORY  Social History     Tobacco Use    Smoking status: Former Smoker     Packs/day: 2.00     Years: 30.00     Pack years: 60.00     Types: Cigarettes     Quit date: 1998     Years since quittin.2    Smokeless tobacco: Never Used   Substance Use Topics    Alcohol use: Yes     Alcohol/week: 0.0 oz     Comment: 5 per week    Drug use: No       SURGICAL HISTORY  Past Surgical History:   Procedure Laterality Date    GASTROSCOPY N/A 2018    Procedure: GASTROSCOPY;  Surgeon: Amari Juárez M.D.;  Location: SURGERY [facility];  Service: Gastroenterology    CERVICAL FORAMINOTOMY  2013    Performed by Krish Yang M.D. at SURGERY [facility]    OTHER  2010    torn retina RIGHT EYE    SCLERAL BUCKLING  2009    Performed by YVETTE DRAPER at SURGERY [facility]    OTHER ORTHOPEDIC SURGERY      l knee       CURRENT MEDICATIONS  I personally reviewed the medication list in the charting documentation.     ALLERGIES  Allergies   Allergen Reactions    Aspirin      INTERNAL BLEEDING, 325 mg, pt reports he is ok to take ASPRIN 81MG only if he takes OMEPRAZOLE with his ASPIRIN    Augmentin Vomiting    Clindamycin      Develop c.diff    Metronidazole      Not sure    Nsaids Unspecified     GI bleed    Vancomycin Swelling    Ace Inhibitors Cough       MEDICAL RECORD  I have reviewed patient's medical record and pertinent results are listed above.      PHYSICAL EXAM  VITAL SIGNS: /73   Pulse 68   Temp 37.1 °C (98.7 °F) (Temporal)   Resp 19   Ht 1.854 m (6' 1\")   Wt 85.2 kg (187 lb 13.3 oz)   SpO2 97%   BMI 24.78 kg/m²    Constitutional: Well appearing patient in no acute distress.  Awake and alert, not toxic nor ill in appearance.  HENT: Normocephalic, no obvious evidence of acute trauma.  Eyes: No scleral icterus. Normal conjunctiva   Neck: Comfortable movement without any obvious restriction in the range of motion.  Cardiovascular: Upon ascultation I appreciate a " regular heart rhythm and a normal rate.   Thorax & Lungs: Normal nonlabored respirations. Upon ascultation, there is no obvious chest wall tenderness. I appreciate no wheezing, rhonchi or rales. There is normal air movement.    Abdomen: The abdomen is not visibly distended. Upon palpation, I find it to be without tenderness.  No mass appreciated.  Skin: The exposed portions of skin reveal no obvious rash or other abnormalities.  Extremities/Musculoskeletal: Symmetric 1+ lower extremity pitting edema without asymmetry, no calf tenderness.   Neurologic: Alert & oriented. No focal deficits observed.   Psychiatric: Normal affect appropriate for the clinical situation.    DIAGNOSTIC STUDIES / PROCEDURES    LABS/EKGs  Results for orders placed or performed during the hospital encounter of 03/19/21   aPTT   Result Value Ref Range    APTT 34.3 24.7 - 36.0 sec   Prothrombin Time   Result Value Ref Range    PT 17.2 (H) 12.0 - 14.6 sec    INR 1.36 (H) 0.87 - 1.13   Heparin Xa (Unfractionated)   Result Value Ref Range    Heparin Xa (UFH) <0.10 IU/mL   CBC WITH DIFFERENTIAL   Result Value Ref Range    WBC 18.5 (H) 4.8 - 10.8 K/uL    RBC 4.23 (L) 4.70 - 6.10 M/uL    Hemoglobin 12.0 (L) 14.0 - 18.0 g/dL    Hematocrit 37.7 (L) 42.0 - 52.0 %    MCV 89.1 81.4 - 97.8 fL    MCH 28.4 27.0 - 33.0 pg    MCHC 31.8 (L) 33.7 - 35.3 g/dL    RDW 42.5 35.9 - 50.0 fL    Platelet Count 175 164 - 446 K/uL    MPV 11.7 9.0 - 12.9 fL    Neutrophils-Polys 74.60 (H) 44.00 - 72.00 %    Lymphocytes 12.00 (L) 22.00 - 41.00 %    Monocytes 10.80 0.00 - 13.40 %    Eosinophils 1.40 0.00 - 6.90 %    Basophils 0.30 0.00 - 1.80 %    Immature Granulocytes 0.90 0.00 - 0.90 %    Nucleated RBC 0.00 /100 WBC    Neutrophils (Absolute) 13.79 (H) 1.82 - 7.42 K/uL    Lymphs (Absolute) 2.21 1.00 - 4.80 K/uL    Monos (Absolute) 1.99 (H) 0.00 - 0.85 K/uL    Eos (Absolute) 0.25 0.00 - 0.51 K/uL    Baso (Absolute) 0.05 0.00 - 0.12 K/uL    Immature Granulocytes (abs) 0.17 (H)  0.00 - 0.11 K/uL    NRBC (Absolute) 0.00 K/uL   Comp Metabolic Panel   Result Value Ref Range    Sodium 133 (L) 135 - 145 mmol/L    Potassium 4.1 3.6 - 5.5 mmol/L    Chloride 97 96 - 112 mmol/L    Co2 24 20 - 33 mmol/L    Anion Gap 12.0 7.0 - 16.0    Glucose 184 (H) 65 - 99 mg/dL    Bun 16 8 - 22 mg/dL    Creatinine 0.61 0.50 - 1.40 mg/dL    Calcium 7.8 (L) 8.5 - 10.5 mg/dL    AST(SGOT) 43 12 - 45 U/L    ALT(SGPT) 32 2 - 50 U/L    Alkaline Phosphatase 297 (H) 30 - 99 U/L    Total Bilirubin 0.9 0.1 - 1.5 mg/dL    Albumin 2.5 (L) 3.2 - 4.9 g/dL    Total Protein 5.6 (L) 6.0 - 8.2 g/dL    Globulin 3.1 1.9 - 3.5 g/dL    A-G Ratio 0.8 g/dL   SARS-CoV-2 PCR (24 hour In-House): Collect NP swab in [facility]    Specimen: Respirate   Result Value Ref Range    SARS-CoV-2 Source NP Swab     SARS-CoV-2 by PCR NotDetected    ESTIMATED GFR   Result Value Ref Range    GFR If African American >60 >60 mL/min/1.73 m 2    GFR If Non African American >60 >60 mL/min/1.73 m 2   Magnesium   Result Value Ref Range    Magnesium 1.8 1.5 - 2.5 mg/dL   HEMOGLOBIN A1C   Result Value Ref Range    Glycohemoglobin 9.6 (H) 4.0 - 5.6 %    Est Avg Glucose 229 mg/dL   IRON/TOTAL IRON BIND   Result Value Ref Range    Iron 19 (L) 50 - 180 ug/dL    Total Iron Binding 202 (L) 250 - 450 ug/dL    Unsat Iron Binding 183 110 - 370 ug/dL    % Saturation 9 (L) 15 - 55 %   RETICULOCYTES COUNT   Result Value Ref Range    Reticulocyte Count 1.9 0.8 - 2.1 %    Retic, Absolute 0.09 (H) 0.04 - 0.06 M/uL    Imm. Reticulocyte Fraction 17.1 9.3 - 17.4 %    Retic Hgb Equivalent 27.0 (L) 29.0 - 35.0 pg/cell   EKG   Result Value Ref Range    Report       [facility]    Test Date:  2021  Pt Name:    RAINE ERNST               Department: ER  MRN:        [id number]                      Room:       T827  Gender:     Male                         Technician: ANGELO  :        1945                   Requested By:LEXII RIVAS  Order #:    [id number]                     Reading MD:    Measurements  Intervals                                Axis  Rate:       82                           P:          72  CA:         131                          QRS:        19  QRSD:       90                           T:          40  QT:         384  QTc:        449    Interpretive Statements  SINUS RHYTHM  Compared to ECG 07/07/2020 13:09:20  Sinus bradycardia no longer present          RADIOLOGY  EC-ECHOCARDIOGRAM COMPLETE W/O CONT         US-EXTREMITY VENOUS LOWER BILAT   Final Result      DX-CHEST-LIMITED (1 VIEW)   Final Result      No evidence of acute cardiopulmonary process.            COURSE & MEDICAL DECISION MAKING  I have reviewed any medical record information, laboratory studies and radiographic results as noted above.    Encounter Summary: This is a very pleasant 76 y.o. male who unfortunately required evaluation in the emergency department today with newly diagnosed pulmonary emboli on outpatient CT scan.  He was recently diagnosed with pancreatic cancer with liver metastasis and was undergoing a CT scan of his chest to evaluate for pulmonary metastasis.  Fortunately this was negative for obvious metastasis but unfortunately revealed the pulmonary emboli.  His oxygen saturation was 87% at his oncology office, 89% upon evaluation in the emergency department here.  He requires anticoagulation.  Now the patient tells me that he is scheduled for a liver biopsy early next week, so for now we will put him on heparin, he will be admitted to the hospital in guarded condition for further evaluation and treatment      DISPOSITION: Admit in guarded condition      FINAL IMPRESSION  1. Other acute pulmonary embolism without acute cor pulmonale (HCC)    2. Pancreatic cancer metastasized to liver (HCC)           This dictation was created using voice recognition software. The accuracy of the dictation is limited to the abilities of the software. I expect there may be some errors of grammar and possibly  content. The nursing notes were reviewed and certain aspects of this information were incorporated into this note.    Electronically signed by: Rick Vyas M.D., 3/19/2021 1:36 PM

## 2021-03-19 NOTE — ASSESSMENT & PLAN NOTE
PMH of  UGI bleed and hemoperitoneum (s/p exploratory laparotomy and evacuation hemoperitoneum 2007), stated it was from Aspirin allergy and that he takes 81mg Aspirin with Omeprazole. Denies any recent GI changes.    Plan:  - 40mg BID Omeprazole PO  - Con't to monitor  - Reticulocyte count, TSH/T4, Folate/B12, and Occult blood ordered

## 2021-03-19 NOTE — ASSESSMENT & PLAN NOTE
PMH of DLD but last Lipid Panel 08/2020 was unremarkable and is on Atorvastatin outpatient.    Plan:  - Con't home Atorvastatin

## 2021-03-19 NOTE — ASSESSMENT & PLAN NOTE
PMH of coronary artery disease status post stent placement August 2020.    Plan:  - EKG, CXR, and TTE ordered  - Con't all home medications except aspirin, HCTZ, and Aldactone  - Con't telemetry

## 2021-03-20 PROBLEM — E11.9 DIABETES MELLITUS, NEW ONSET (HCC): Status: ACTIVE | Noted: 2021-01-01

## 2021-03-20 PROBLEM — I82.409 DVT (DEEP VENOUS THROMBOSIS) (HCC): Status: ACTIVE | Noted: 2021-01-01

## 2021-03-20 NOTE — ASSESSMENT & PLAN NOTE
Extensive B/L DVTs on admission, see US report for more details. Patient was made strict bed rest on admission after US findings and was already started on Heparin drip for B/L PEs. Etiology likely due to Stage 4 Pancreatic cancer.    Plan:  - 03/20: IVC Filter placed and Liver biopsy done, pending result  - Con't Heparin Drip  - Fall, aspiration, and seizure precautions  - PT/OT and Respiratory therapy

## 2021-03-20 NOTE — PROGRESS NOTES
2 RN Skin Check    2 RN skin check complete.   Devices in place: Nasal Cannula.  Skin assessed under devices: yes.  Confirmed pressure ulcers found on: NA.  New potential pressure ulcers noted on NA. Wound consult placed N/A.  The following interventions in place Pillows.

## 2021-03-20 NOTE — PROGRESS NOTES
Assumed care at 1915. Bedside report received from ritesh RN. Patient's chart and MAR reviewed. 12 hour chart check complete. Assessment complete, pt 0/10 pain at this time. Pt is awake in bed. Pt is A & O x 4. Patient was updated on plan of care for the day. Questions answered and concerns addressed.  Pt denies any additional needs at this time. White board updated. Call light, phone and personal belongings within reach. Bed alarm on and working appropriately. Vital signs stable.

## 2021-03-20 NOTE — OR SURGEON
Immediate Post- Operative Note        PostOp Diagnosis:DVT and PE.  Panc CA    Procedure(s): IVC Filter Placement and Ultrasound guided Liver biopsy    Estimated Blood Loss: Less than 5 ml    Complications: None    3/20/2021     10:01 AM     Son Whitaker M.D.

## 2021-03-20 NOTE — PROGRESS NOTES
History & Physical Note    Date of Admission: 3/20/2021  Admission Status: Inpatient  [facility] Team: [facility]  Attending: MANJINDER Tristan M.D.   Senior Resident: Dr. Leal  Intern: Dr. Isai Redman  Contact Number: [phone]    Chief Complaint:   Bilateral Pulmonary Embolisms     Subjective:  No acute events overnight. Patient denies any chest pain, palpitations, leg pain, swelling, SOB, HA, vision changes, LOC, or bleeding.  Discussed with patient and son regarding the IVF filter and liver biopsy today. Also discussed discharge and that the patient will probably need home oxygen and lifetime anticoagulation. They repeated understanding and all questions were answered.    Interval events:  03/20: IVC Filter placed and Liver biopsy done, pending result  ECG 03/19: normal sinus rhythm  TTE 03/19: 65% EF and right ventricular systolic pressure  is 36 mmHg (likely secondary to B/L PEs)  Resuming Heparin drip  9.6 A1C 03/19/2021, but blood glucose 178.    Consultants/Specialty:    Review of Systems:  Review of Systems   Constitutional: Positive for malaise/fatigue and weight loss (15 lbs in roughly a month). Negative for chills, diaphoresis and fever.   HENT: Negative for ear pain, hearing loss and tinnitus.    Eyes: Negative for blurred vision, double vision and photophobia.   Respiratory: Negative for cough, hemoptysis, sputum production, shortness of breath and wheezing.    Cardiovascular: Negative for chest pain, palpitations, orthopnea, claudication, leg swelling and PND.   Gastrointestinal: Positive for abdominal pain (Diffuse, sharp). Negative for blood in stool, constipation, diarrhea, heartburn, melena, nausea and vomiting.   Genitourinary: Negative for dysuria, flank pain, frequency, hematuria and urgency.   Musculoskeletal: Negative for back pain, falls, joint pain, myalgias and neck pain.   Skin: Negative for itching and rash.   Neurological: Negative for tingling, tremors, sensory change, speech  change, focal weakness, seizures, loss of consciousness, weakness and headaches.   Endo/Heme/Allergies: Negative for polydipsia. Does not bruise/bleed easily.   Psychiatric/Behavioral: Negative for depression, hallucinations, memory loss, substance abuse and suicidal ideas. The patient is not nervous/anxious and does not have insomnia.        Physical Exam:   Vitals:  Temp:  [36.3 °C (97.4 °F)-37.1 °C (98.8 °F)] 36.3 °C (97.4 °F)  Pulse:  [66-87] 77  Resp:  [14-24] 16  BP: (119-157)/(61-78) 132/67  SpO2:  [92 %-100 %] 97 %    Physical Exam  Constitutional:       General: He is not in acute distress.     Appearance: Normal appearance. He is normal weight. He is not ill-appearing, toxic-appearing or diaphoretic.      Comments: Cooperative and pleasant Elderly patient that appears better than his chart would suggest.   HENT:      Head: Normocephalic and atraumatic.      Right Ear: Tympanic membrane normal.      Left Ear: Tympanic membrane normal.      Nose: Nose normal. No congestion or rhinorrhea.      Mouth/Throat:      Mouth: Mucous membranes are dry.      Pharynx: Oropharynx is clear. No oropharyngeal exudate or posterior oropharyngeal erythema.   Eyes:      General: No scleral icterus.     Extraocular Movements: Extraocular movements intact.      Conjunctiva/sclera: Conjunctivae normal.      Pupils: Pupils are equal, round, and reactive to light.   Cardiovascular:      Rate and Rhythm: Normal rate and regular rhythm.      Pulses: Normal pulses.      Heart sounds: Normal heart sounds. No murmur. No gallop.    Pulmonary:      Effort: Pulmonary effort is normal. No respiratory distress.      Breath sounds: Normal breath sounds. No stridor. No wheezing, rhonchi or rales.      Comments: On 2L NC.  Chest:      Chest wall: No tenderness.   Abdominal:      General: Abdomen is flat. Bowel sounds are normal. There is no distension.      Palpations: Abdomen is soft. There is no mass.      Tenderness: There is no abdominal  tenderness. There is no guarding or rebound.      Hernia: No hernia is present.      Comments: Patient did not even wince during abdominal palpation.   Musculoskeletal:         General: No swelling, tenderness, deformity or signs of injury. Normal range of motion.      Cervical back: Normal range of motion and neck supple.   Skin:     General: Skin is warm and dry.      Capillary Refill: Capillary refill takes less than 2 seconds.      Coloration: Skin is not jaundiced or pale.      Findings: No bruising, erythema, lesion or rash.   Neurological:      Mental Status: He is alert and oriented to person, place, and time. Mental status is at baseline.      Cranial Nerves: No cranial nerve deficit.      Sensory: No sensory deficit.      Motor: No weakness.      Coordination: Coordination normal.      Gait: Gait normal.      Deep Tendon Reflexes: Reflexes normal.   Psychiatric:         Mood and Affect: Mood normal.         Behavior: Behavior normal.         Thought Content: Thought content normal.         Judgment: Judgment normal.         Labs:   Results for orders placed or performed during the hospital encounter of 03/19/21   EC-ECHOCARDIOGRAM COMPLETE W/O CONT   Result Value Ref Range    Eject.Frac. MOD BP 50.02     Eject.Frac. MOD 4C 49.98     Eject.Frac. MOD 2C 52.72     Left Ventrical Ejection Fraction 65    aPTT   Result Value Ref Range    APTT 34.3 24.7 - 36.0 sec   Prothrombin Time   Result Value Ref Range    PT 17.2 (H) 12.0 - 14.6 sec    INR 1.36 (H) 0.87 - 1.13   Heparin Xa (Unfractionated)   Result Value Ref Range    Heparin Xa (UFH) <0.10 IU/mL   CBC WITH DIFFERENTIAL   Result Value Ref Range    WBC 18.5 (H) 4.8 - 10.8 K/uL    RBC 4.23 (L) 4.70 - 6.10 M/uL    Hemoglobin 12.0 (L) 14.0 - 18.0 g/dL    Hematocrit 37.7 (L) 42.0 - 52.0 %    MCV 89.1 81.4 - 97.8 fL    MCH 28.4 27.0 - 33.0 pg    MCHC 31.8 (L) 33.7 - 35.3 g/dL    RDW 42.5 35.9 - 50.0 fL    Platelet Count 175 164 - 446 K/uL    MPV 11.7 9.0 - 12.9 fL     Neutrophils-Polys 74.60 (H) 44.00 - 72.00 %    Lymphocytes 12.00 (L) 22.00 - 41.00 %    Monocytes 10.80 0.00 - 13.40 %    Eosinophils 1.40 0.00 - 6.90 %    Basophils 0.30 0.00 - 1.80 %    Immature Granulocytes 0.90 0.00 - 0.90 %    Nucleated RBC 0.00 /100 WBC    Neutrophils (Absolute) 13.79 (H) 1.82 - 7.42 K/uL    Lymphs (Absolute) 2.21 1.00 - 4.80 K/uL    Monos (Absolute) 1.99 (H) 0.00 - 0.85 K/uL    Eos (Absolute) 0.25 0.00 - 0.51 K/uL    Baso (Absolute) 0.05 0.00 - 0.12 K/uL    Immature Granulocytes (abs) 0.17 (H) 0.00 - 0.11 K/uL    NRBC (Absolute) 0.00 K/uL   Comp Metabolic Panel   Result Value Ref Range    Sodium 133 (L) 135 - 145 mmol/L    Potassium 4.1 3.6 - 5.5 mmol/L    Chloride 97 96 - 112 mmol/L    Co2 24 20 - 33 mmol/L    Anion Gap 12.0 7.0 - 16.0    Glucose 184 (H) 65 - 99 mg/dL    Bun 16 8 - 22 mg/dL    Creatinine 0.61 0.50 - 1.40 mg/dL    Calcium 7.8 (L) 8.5 - 10.5 mg/dL    AST(SGOT) 43 12 - 45 U/L    ALT(SGPT) 32 2 - 50 U/L    Alkaline Phosphatase 297 (H) 30 - 99 U/L    Total Bilirubin 0.9 0.1 - 1.5 mg/dL    Albumin 2.5 (L) 3.2 - 4.9 g/dL    Total Protein 5.6 (L) 6.0 - 8.2 g/dL    Globulin 3.1 1.9 - 3.5 g/dL    A-G Ratio 0.8 g/dL   SARS-CoV-2 PCR (24 hour In-House): Collect NP swab in VTM    Specimen: Respirate   Result Value Ref Range    SARS-CoV-2 Source NP Swab     SARS-CoV-2 by PCR NotDetected    ESTIMATED GFR   Result Value Ref Range    GFR If African American >60 >60 mL/min/1.73 m 2    GFR If Non African American >60 >60 mL/min/1.73 m 2   Magnesium   Result Value Ref Range    Magnesium 1.8 1.5 - 2.5 mg/dL   FERRITIN   Result Value Ref Range    Ferritin 1995.0 (H) 22.0 - 322.0 ng/mL   FOLATE   Result Value Ref Range    Folate -Folic Acid 8.9 >4.0 ng/mL   HEMOGLOBIN A1C   Result Value Ref Range    Glycohemoglobin 9.6 (H) 4.0 - 5.6 %    Est Avg Glucose 229 mg/dL   IRON/TOTAL IRON BIND   Result Value Ref Range    Iron 19 (L) 50 - 180 ug/dL    Total Iron Binding 202 (L) 250 - 450 ug/dL    Unsat  Iron Binding 183 110 - 370 ug/dL    % Saturation 9 (L) 15 - 55 %   RETICULOCYTES COUNT   Result Value Ref Range    Reticulocyte Count 1.9 0.8 - 2.1 %    Retic, Absolute 0.09 (H) 0.04 - 0.06 M/uL    Imm. Reticulocyte Fraction 17.1 9.3 - 17.4 %    Retic Hgb Equivalent 27.0 (L) 29.0 - 35.0 pg/cell   TSH WITH REFLEX TO FT4   Result Value Ref Range    TSH 3.800 0.380 - 5.330 uIU/mL   VITAMIN B12   Result Value Ref Range    Vitamin B12 -True Cobalamin >4000 (H) 211 - 911 pg/mL   Heparin Xa (Unfractionated)   Result Value Ref Range    Heparin Xa (UFH) 0.26 IU/mL   CBC with Differential   Result Value Ref Range    WBC 14.8 (H) 4.8 - 10.8 K/uL    RBC 4.39 (L) 4.70 - 6.10 M/uL    Hemoglobin 12.3 (L) 14.0 - 18.0 g/dL    Hematocrit 39.2 (L) 42.0 - 52.0 %    MCV 89.3 81.4 - 97.8 fL    MCH 28.0 27.0 - 33.0 pg    MCHC 31.4 (L) 33.7 - 35.3 g/dL    RDW 42.3 35.9 - 50.0 fL    Platelet Count 191 164 - 446 K/uL    MPV 11.5 9.0 - 12.9 fL    Neutrophils-Polys 72.10 (H) 44.00 - 72.00 %    Lymphocytes 14.00 (L) 22.00 - 41.00 %    Monocytes 9.20 0.00 - 13.40 %    Eosinophils 3.70 0.00 - 6.90 %    Basophils 0.30 0.00 - 1.80 %    Immature Granulocytes 0.70 0.00 - 0.90 %    Nucleated RBC 0.00 /100 WBC    Neutrophils (Absolute) 10.63 (H) 1.82 - 7.42 K/uL    Lymphs (Absolute) 2.07 1.00 - 4.80 K/uL    Monos (Absolute) 1.36 (H) 0.00 - 0.85 K/uL    Eos (Absolute) 0.54 (H) 0.00 - 0.51 K/uL    Baso (Absolute) 0.05 0.00 - 0.12 K/uL    Immature Granulocytes (abs) 0.11 0.00 - 0.11 K/uL    NRBC (Absolute) 0.00 K/uL   Comp Metabolic Panel (CMP)   Result Value Ref Range    Sodium 133 (L) 135 - 145 mmol/L    Potassium 3.8 3.6 - 5.5 mmol/L    Chloride 95 (L) 96 - 112 mmol/L    Co2 29 20 - 33 mmol/L    Anion Gap 9.0 7.0 - 16.0    Glucose 178 (H) 65 - 99 mg/dL    Bun 12 8 - 22 mg/dL    Creatinine 0.68 0.50 - 1.40 mg/dL    Calcium 8.1 (L) 8.5 - 10.5 mg/dL    AST(SGOT) 41 12 - 45 U/L    ALT(SGPT) 31 2 - 50 U/L    Alkaline Phosphatase 290 (H) 30 - 99 U/L    Total  Bilirubin 0.9 0.1 - 1.5 mg/dL    Albumin 2.6 (L) 3.2 - 4.9 g/dL    Total Protein 5.6 (L) 6.0 - 8.2 g/dL    Globulin 3.0 1.9 - 3.5 g/dL    A-G Ratio 0.9 g/dL   MAGNESIUM   Result Value Ref Range    Magnesium 1.7 1.5 - 2.5 mg/dL   Prothrombin Time   Result Value Ref Range    PT 18.0 (H) 12.0 - 14.6 sec    INR 1.44 (H) 0.87 - 1.13   APTT   Result Value Ref Range    APTT 232.1 (HH) 24.7 - 36.0 sec   ESTIMATED GFR   Result Value Ref Range    GFR If African American >60 >60 mL/min/1.73 m 2    GFR If Non African American >60 >60 mL/min/1.73 m 2   Heparin Xa (Unfractionated)   Result Value Ref Range    Heparin Xa (UFH) 0.37 IU/mL   EKG   Result Value Ref Range    Report       [facility]    Test Date:  2021  Pt Name:    RAINE ERNST               Department: ER  MRN:        [id number]                      Room:       [facility]  Gender:     Male                         Technician: [facility]  :        1945                   Requested By:LEXII RIVAS  Order #:    [id number]                    Reading MD: Darcie Garrison MD    Measurements  Intervals                                Axis  Rate:       82                           P:          72  CA:         131                          QRS:        19  QRSD:       90                           T:          40  QT:         384  QTc:        449    Interpretive Statements  SINUS RHYTHM  NON-SPECIFIC ST CHANGES  Compared to ECG 2020 13:09:20  No significant change noted  Electronically Signed On 3- 20:52:41 PDT by Darcie Garrison MD          Imaging:   IR-LIVER BX WITH OTHER STUDY   Final Result      ULTRASOUND GUIDED LIVER MASS BIOPSY.      IR-INSERT IVC FILTER WITH IG & SI   Final Result         1. ULTRASOUND AND FLUOROSCOPIC GUIDED PLACEMENT OF A OPTION ELITE CAVAL FILTER DEPLOYED IN THE INFRARENAL IVC.      2. INFERIOR VENA CAVAGRAM WITHIN NORMAL LIMITS WITH NO EVIDENCE OF CAVAL THROMBUS OR OCCLUSION.      EC-ECHOCARDIOGRAM COMPLETE W/O CONT   Final Result       US-EXTREMITY VENOUS LOWER BILAT   Final Result      DX-CHEST-LIMITED (1 VIEW)   Final Result      No evidence of acute cardiopulmonary process.            Previous Data Review: reviewed    Problem Representation:  76 y.o. male with a PMH of recently diagnosed Stage 4 pancreatic cancer (03/09 and mets to the liver), coronary artery disease status post stent placement August 2020, hypertension, dyslipidemia, UGI bleed and hemoperitoneum (s/p exploratory laparotomy and evacuation hemoperitoneum 2007) who presented 3/19/2021 after outside CT scan revealed bilateral pulmonary embolisms.     * Acute pulmonary embolism (HCC)- (present on admission)  Assessment & Plan  3/19/2021  outside CT scan revealed bilateral pulmonary embolisms, scans but the not the formal report are in the chart.  Patient was started on heparin drip in the ED and patient is on 2L NC (baseline no home oxygen).  ECG 03/19: normal sinus rhythm  TTE 03/19: 65% EF and right ventricular systolic pressure  is 36 mmHg (likely secondary to B/L PEs)    Plan:  - Con't telemetry  - Con't Heparin drip and SCDs  - 03/20: IVC Filter placed and Liver biopsy done, pending result  - Diabetic diet  - Fall, aspiration, and seizure precautions  - Con't all home medications except aspirin, HCTZ, and Aldactone  - 40mg BID Omeprazole PO  - DNR/DNI  - PT/OT and Respiratory therapy   - Pulse Ox        DVT (deep venous thrombosis) (HCC)- (present on admission)  Assessment & Plan  Extensive B/L DVTs on admission, see US report for more details. Patient was made strict bed rest on admission after US findings and was already started on Heparin drip for B/L PEs. Etiology likely due to Stage 4 Pancreatic cancer.    Plan:  - 03/20: IVC Filter placed and Liver biopsy done, pending result  - Con't Heparin Drip  - Fall, aspiration, and seizure precautions  - PT/OT and Respiratory therapy     Carcinoma of pancreas metastatic to liver (HCC)- (present on admission)  Assessment &  Plan  Patient had abdominal pain, fatigue, and decrease appetite in Jan 2021 but was recently diagnosed Stage 4 pancreatic cancer (03/09 and mets to the liver), follows outpatient with Dr. Zhang and was  scheduled for a  Liver biopsy 03/23/2021. CA 19-9 elevated at 391 and CEA elevated at 370. Patient was started on heparin drip 03/19/2021.     Plan:  - Consult Oncology, greatly appreciate recommendations  - Con't Heparin drip and SCDs, will wait for input from Oncology whether they want to still do the Liver biopsy and hold Heparin inpatient (hold heparin 4 hours prior to biopsy)  - Diabetic diet  - Fall, aspiration, and seizure precautions  - Con't Telemetry  - Con't all home medications except aspirin, HCTZ, and Aldactone  - Con't home Norco and Tylenol PRN for breakthrough pain  - DNR/DNI    Diabetes mellitus, new onset (HCC)  Assessment & Plan  On admission glucose 241 and later decreased to 184. Patient states he does not take any medications for diabetes at home and has never been diagnosed. Per chart review, last A1C 08/2020 qas 6.5% and the highest it has been in the past on file was 7.0. Patient denies any increase in sugary intake recently.   9.6 A1C 03/19/2021, but blood glucose 178. Etiology likely due to Stage 4 Pancreatic Cancer.    Plan:  - Diabetic diet  - Will consider adding SSI and hypoglycemia protocol pending AM labs as patient is currently already at inpatient goal of 140-180    Hypertension- (present on admission)  Assessment & Plan  PMH of HTN.    Plan:  - Con't all home medications except aspirin, HCTZ, and Aldactone    Anemia  Assessment & Plan  On admission, hemoglobin 12.0 (baseline 14-15) and MCV 89.1.    Plan:  - 40mg BID Omeprazole PO  - Reticulocyte count, TSH/T4, Folate/B12, and Occult blood ordered  - Con't to monitor    S/P angioplasty with stent- (present on admission)  Assessment & Plan  PMH of coronary artery disease status post stent placement August 2020.    Plan:  - EKG,  CXR, and TTE ordered  - Con't all home medications except aspirin, HCTZ, and Aldactone  - Con't telemetry    History of gastrointestinal bleeding- (present on admission)  Assessment & Plan  PMH of  UGI bleed and hemoperitoneum (s/p exploratory laparotomy and evacuation hemoperitoneum 2007), stated it was from Aspirin allergy and that he takes 81mg Aspirin with Omeprazole. Denies any recent GI changes.    Plan:  - 40mg BID Omeprazole PO  - Con't to monitor  - Reticulocyte count, TSH/T4, Folate/B12, and Occult blood ordered    Dyslipidemia- (present on admission)  Assessment & Plan  PMH of DLD but last Lipid Panel 08/2020 was unremarkable and is on Atorvastatin outpatient.    Plan:  - Con't home Atorvastatin

## 2021-03-20 NOTE — PROGRESS NOTES
IR Nursing Note  IVC filter placement and non targeted liver biopsy by Dr. Whitaker    Site Marked via imaging, Procedure Confirmed with MD, RN, RT and patient pre procedure.  IVC filter placement and non targeted liver biopsy by MD Whitaker assisted by RT emy, right groin venous access site.    Venous access site, pressure held for 8 minutes per Emy, RT. Tegaderm and guaze placed.  Pressure held for 2 minutes per emy, RT to right abd biopsy puncture site. Tegaderm and gauze placed, CDI.        Patient tolerated procedures, report given to KATH tejada.  Patient transported to [facility] via IR RN monitored then transferred care to report RN.

## 2021-03-20 NOTE — PROGRESS NOTES
Patient educated on strict bedrest orders and risks of ambulating. Patient says he needs to have a bowel movement. Bed pan offered but patient refused. Refusing bedrest to ambulate to bedside commode.

## 2021-03-20 NOTE — CONSULTS
DATE OF SERVICE:  03/20/2021     CONSULT REQUESTED BY: Dr Tristan     REASON FOR CONSULTATION:  Presumed metastatic pancreatic carcinoma and   extensive PE and DVT.     HISTORY OF PRESENT ILLNESS:  He is a 76-year-old patient who otherwise in good   health, started having weight loss and abdominal pain over the past three   months or so.  The patient had a recent normal colonoscopy. He had a followup   CT scan from 03/08/2021, which showed a 4-cm pancreatic mass involving the   body with invasion of the antral wall of the stomach along with portal vein.    There were numerous hepatic metastatic lesions and chente hepatic   lymphadenopathy.  The patient was seen by Dr. Zhang on 03/16/2021.  He   recommended a stat CT-guided biopsy. Staging workup   CT chest on 03/19/2021 with PE protocol showing several bilateral   pulmonary emboli throughout both lungs.  No pulmonary infarct seen.  No CT   evidence of right ventricular strain, with no evidence of pulmonary   metastasis.  The patient was sent to the ER. Over here, he did have ultrasound   of the lower extremities on 03/19/2021 and found to have right thrombus   involving the distal common femoral vein, femoral veins and popliteal vein,   thrombus within the posterior tibial vein and soleal vein; left thrombus in   the left common femoral vein, femoral vein, profunda, and popliteal vein; and   thrombus in the posterior tibial veins and soleal veins as well.  The patient   has been admitted for further recommendations.  He was started on heparin   drip.  He was found to have WBC elevation, hemoglobin and platelets pretty   unremarkable, mild hyponatremia, liver function within normal limits, calcium   at 8.1, and total bilirubin 0.9.     REVIEW OF SYSTEMS:  Negative besides stated above.     PHYSICAL EXAMINATION:    VITAL SIGNS:  Temperature 36.3, pulse 77, respiratory rate 16, 97%, and blood   pressure 132/67.  GENERAL:  The patient is in no distress.  HEENT:   Mucous membranes are moist.  NECK:  No lymphadenopathy and no thyromegaly.  LUNGS:  Clear to auscultation bilaterally.  CARDIOVASCULAR:  Regular rhythm and rate.  ABDOMEN:  Soft, tender on palpation.  EXTREMITIES:  Swelling bilaterally.     ASSESSMENT AND PLAN:  1.  The patient does have a pancreatic body mass with extensive metastatic   disease.  The patient is going for a CT-guided biopsy of the liver lesion.    The heparin drip has been on hold.  2.  Extensive bilateral PE and DVT. The patient will also go for an IVC filter   given the extension of the DVT on the lower extremities and also extension of   the PE; the patient was not found to have a cardiac compromise on recent CT   scan.  Vital signs within normal limits.  Heparin drip has been on hold for   Now.  Echocardiogram with no hemodynamic compromise.  After IR intervention heparing drip will get restarted subsequently   can be discharged on Eliquis loading dose of  10 mg b.i.d. for one week and follow with 5 mg b.i.d. indefinitely.  Other   medical issues to be treated by primary team.        ______________________________  MD ROZINA Kolb III/MELISSA/JENN    DD:  03/20/2021 13:04  DT:  03/20/2021 15:24    Job#:  [id number]

## 2021-03-20 NOTE — DIETARY
"Nutrition services: Day 1 of admit.  Vinod Conrad is a 76 y.o. male with admitting DX of pulmonary embolism.   Consult received for MST 3 (14-23# wt loss in 1 month, decreased appetite).     Spoke w/ pt and wife at bedside. They report pt follows fairly strict diet at home for diabetes management. Pt states he has been eating less than normal d/t food being \"bland\" and not being able to eat what he likes. Discussed meal balance for diabetes management while empahsizing adequate PO to maintain weight with recent cancer diagnosis. Pt recently began drinking Glucerna shakes at home, would like Boost Glucose Control once diet advances - will contact MD for orders. States usual weight is ~195# (88.6 kg) and reports 15# wt loss in the last month.     Assessment:  Height: 185.4 cm (6' 1\")  Weight: 85.4 kg (188 lb 4.4 oz)  Body mass index is 24.84 kg/m²., BMI classification: Normal.   Diet/Intake: NPO.     Evaluation:   1. Admitted for pulmonary embolisms. Pt with newly dx stage 4 pancreatic cancer with mets to liver (3/9). PHx of DM, HTN, CAD, hyperlipidemia.   2. Pending IVC filter placement and ultrasound guided liver biopsy this AM.   3. Labs: Na 133, Glucose 178. (3/19) HgbA1c 9.6% - per chart review, A1c prevously ranged from 6-7% for many years. Last A1c on 8/14/20 was 6.5%.     4. Nutrition-focused physical exam: unremarkable.   5. Weight hx per chart review:   · 3/19/21: 85.4 kg (188#)  · 8/11/20: 90.9 kg (200#)  · Down 12# / 6% x 7 months - notable but not severe. ?if pt has lost more as current weight is via bed scale.     Malnutrition Risk: At risk given recent cancer diagnosis, but does not meet criteria at this time.     Recommendations/Plan:  1. Resume diet per MD following procedure.   2. Once diet resumes, add Boost Glucose Control BID.   3. Obtain standing scale weight as able.   4. Encourage intake of meals. Document intake of all meals as % taken in ADL's to provide interdisciplinary " communication across all shifts.  5. Nutrition rep will continue to see patient for ongoing meal and snack preferences.     RD Following.

## 2021-03-20 NOTE — PROGRESS NOTES
Patient transferred from cath lab, s on 2L nc.   Groin and abdominal puncture sites covered with gauze and tape. Clean, dry, and intact, and soft to palpation.

## 2021-03-20 NOTE — CARE PLAN
Problem: Nutritional:  Goal: Achieve adequate nutritional intake  Description: Patient will consume 50% of meals + supplements.   Outcome: NOT MET     Currently NPO for procedure. Once diet advances, add Boost Glucose Control BID.     See RD note.

## 2021-03-20 NOTE — THERAPY
Physical Therapy Contact Note    PT consult received; pt with strict bedrest order; will need to be removed prior to PT evaluation; will monitor;     Shikha Ruiz, PT,  [phone]

## 2021-03-21 PROBLEM — Z95.820 S/P ANGIOPLASTY WITH STENT: Status: RESOLVED | Noted: 2018-08-17 | Resolved: 2021-01-01

## 2021-03-21 NOTE — DISCHARGE PLANNING
Received Choice form at 1231  Agency/Facility Name: [facility]  Referral sent per Choice form @ 1240     Agency[facility]Facility Name: [facility]  Spoke To: Stacey  Outcome: Referral pending review    Agency[facility]Facility Name: [facility]  Spoke To: Stacey  Outcome: Stacey states the patient does not qualify for o2 and pt must sign an ABN and have it faxed back to her.    RNCM Quinn Stephen notified    Agency/Facility Name: [facility]  Spoke To: Stacey  Outcome: Stated that the delivery would be made between [id number]

## 2021-03-21 NOTE — DISCHARGE PLANNING
Anticipated Discharge Disposition: Home with home O2    Action: Order for O2 received. The patient does not have a preference on who to send a referral to.    Choice form faxed to Amari FLETCHER at [phone].    Barriers to Discharge: O2 delivery    Plan: Will follow up.

## 2021-03-21 NOTE — DISCHARGE SUMMARY
Discharge Summary    Date of Admission: 3/19/2021  Date of Discharge: 03/21/2021  Discharging Attending: MANJINDER Tristan M.D.   Discharging Senior Resident: Dr. Leal  Discharging Intern: Dr. Redman    CHIEF COMPLAINT ON ADMISSION  Chief Complaint   Patient presents with   • Sent by MD zhu [facility] s/p CTA which showed multiple PEs; pt has known hx of metastatic pancreatic CA; pt currently denies SOB or CP; pt noted to be 87% on RA; placed on 2 L O2 via NC; now 95%       Reason for Admission  Bilateral Pulmonary Embolism    Admission Date  3/21/2021    CODE STATUS  DNAR/DNI    HPI & HOSPITAL COURSE  This is a 76 y.o. male with a PMH of recently diagnosed Stage 4 pancreatic cancer (03/09 and mets to the liver), coronary artery disease status post stent placement August 2020, hypertension, dyslipidemia, UGI bleed and hemoperitoneum (s/p exploratory laparotomy and evacuation hemoperitoneum 2007) who presented 3/19/2021 after outside CT scan revealed bilateral pulmonary embolisms. Patient follows outpatient with Dr. Zhang from Oncology. He stated he was getting CT scans outpatient to monitor his cancer as he still was having abdominal pain despite starting Norco. Incidentally patient was found to have b/l PE's, but denied any blood thinners, history of clots, recent long trips, SOB, orthopnea, chest pain, palpitations, or fevers/chills. He only reports chronic fatigue and loss of appetite since January 2021 (also when the abdominal pain started) and is requiring 2L in the ED (baseline no home oxygen). Patient states his abdominal pain is diffuse, sharp (more upper quadrants) and is worse at night. Patient states it radiates around to the lateral sides but not anywhere else. Patient also mentions 15 lbs weight loss roughly over the past month, but stated he has not had a desire to eat since January. He denies changes in urine or stools, nausea/vomiting, trauma, LOC, bleeding, and medication noncompliance.    On  admission, patient was tachypneic up to 24 until he was placed on 2L NC.  Patient was started on Heparin drip and INR was 1.36 with PT 17.2 and APTT 34.3.  CA 19-9 elevated at 391 and CEA elevated at 370 plus Alk Phos 297. Hemoglobin 12.0 (baseline 14-15) and iron studies plus ferritin revealed a mixed picture of iron deficiency and anemia of chronic disease. A1C was done and his A1C increased from 6.5 to 9.6 over the course of 7 months. Patient's blood sugar the following morning was 178 (goal 140-180 inpatient) and thus did not require insulin supplementation inpatient at this time. Furthermore B/L DVT US revealed extensive DVT's in almost every vein bilaterally (see US report for more details). Patient was placed on strict bed rest and had a IVC filter placed 03/20/2021. Upon discussing case with Dr. Haq, on call Oncologist and works with Dr. Zhang, patient also had a liver biopsy done 03/20/2021 after the IVC filter as patient was also scheduled for it 03/23. Patient reported no leg pain, but still required 2L oxygen upon ambulation so Home Oxygen and Pulse Ox were ordered upon discharge. Patient per Dr. Haq's recommendations will be discharged on Eliquis 10mg BID for 7 days followed by 5mg BID plus he has an appointment to see Dr. Zhang 03/26.       Therefore, he is discharged in good and stable condition to home with close outpatient follow-up.    The patient met 2-midnight criteria for an inpatient stay at the time of discharge.    Discharge Date  03/21/2021    FOLLOW UP ITEMS POST DISCHARGE  Patient to follow up with Dr. Zhang 03/26 pending Liver biopsy results. Defer to Oncology recommendations to adjust Eliquis dosing and if patient can be started on supplemental iron for his mixed iron deficiency anemia and anemia of chronic disease.     Patient advised to see PCP and discuss his 9.6 A1C and need for diabetic outpatient management. PCP also to follow up on home oxygen and see if patient needs a  portable oxygen carrier.    DISCHARGE DIAGNOSES      Principal Problem:    Acute pulmonary embolism (HCC) POA: Yes  Active Problems:    Carcinoma of pancreas metastatic to liver (HCC) POA: Yes    DVT (deep venous thrombosis) (HCC) POA: Yes    Diabetes mellitus, new onset (HCC) POA: Unknown    Anemia POA: Unknown  Resolved Problems:    Hypertension (Chronic) POA: Yes      Overview: 8/08 urine mac 11      9/09 urine mac 14      11/11 on lotrel 5/20 mg and diazide      1/20/14 urine mac 8, on lotrel 5/20 mg and diazide      2/28/17 urine mac 8 on lotrel 5/20 mg and diazide      8/15/18 hospital admission, 8/17/18 hospital discharge; ST elevation       myocardial infarction, cardiac catheterization performed      8/15/18 cardiac catheterization ejection fraction 45%, RCA mid right       diffuse mild calcific atheromatous disease but otherwise patent, left main       normal, % occlusion proximal, circumflex normal, stenting proximal       mid LAD with overlapping xience stents, post implantation no residual       stenosis       8/16/18 chol 126,trig 101,hdl 55,ldl 51      8/17/18 echo mildly reduced LV function, akinesis distal septum and apex,       RVSP 33      8/17/18 discharge on metoprolol 25 mg bid, lisinopril 5 mg qday,lipitor 80       mg qday,prasugrel 10 mg      9/7/18 cardiology note continue dual antiplatelet therapy for at least 6       months up to 1 year, clear to use viagra, patient declines cardiac       rehabilitation, follow-up 3 months      11/9/18 cardiology note declines cardiac rehabilitation, continue DAPT x       one year, consider 6 months if has issues with treatment, decrease PPI to       once a day, follow-up 3 months    History of gastrointestinal bleeding POA: Yes      Overview: 6/07 UGI bleed and hemoperitoneum, s/p exploratory laparotomy and       evacuation hemoperitoneum, no obvious source of bleeding this was done in       [place], prior to this he had been taking aspirin and        anti-inflammatories thus was told to discontinue      2/20/17 no asa      8/16/18 hgb 13.7,hct 41      8/21/18 hospital admission melena ×1 day      8/21/18 hgb 12.4,hct 36.9      8/22/18 hgb 10.8,hct 33.2      8/23/18 EGD per [facility] schatzki's ring, hiatal hernia, scattered antral       gastritis and erosions no active bleeding, biopsy 2 cm duodenal polyp,       cleared to resume aspirin and effient, bid prilosec change to once daily      11/7/18 [facility] note follow-up hospital, status post MI plus stenting, EGD       showed antral gastritis, from aspirin prasugrel, discharged on omeprazole       no recurrent bleeding recent CBC normal, continue long-term       anticoagulants, follow-up as needed      10/12/18 hgb 13,hct 41      11/9/18 cardiology note declines cardiac rehabilitation, continue DAPT x       one year, consider 6 months if has issues with treatment, decrease PPI to       once a day, follow-up 3 months    S/P angioplasty with stent POA: Yes    Dyslipidemia (Chronic) POA: Yes      Overview: 8/08 chol 172,trig 49,hdl 64,ldl 98      9/09 chol 192,trig 98,hdl 59,ldl 113      10/10 chol 164,trig 76,hdl 50,ldl 99 lipitor 20 mg      11/12 chol 176,trig 99,hdl 55,ldl 101; cpk 74,crp 7.8 on lipitor 20 mg      9/20/13 chol 150,trig 92,hdl 42,ldl 90 on lipitor 20 mg      9/24/14 chol 161,trig 90,hdl 43,ldl 100 on lipitor 20 mg      8/8/15 chol 167,trig 86,hdl 44,ldl 106 on lipitor 20 mg      10/18/16 chol 164,trig 107,hdl 41,ldl 102 on lipitor 20 mg      8/16/18 chol 126,trig 101,hdl 55,ldl 51 on lipitor 80 mg                        FOLLOW UP  Future Appointments   Date Time Provider Department Center   3/23/2021 11:00 AM [facility] CT 4 IR [facility]   3/31/2021 11:00 AM [facility] CT 4 [facility]     No follow-up provider specified.    MEDICATIONS ON DISCHARGE     Medication List      START taking these medications      Instructions   * apixaban 5mg Tabs  Commonly known as: ELIQUIS   Doctor's comments: Take  10mg BID for a total of 7 days. Start 03/21/2021 3-4 hours AFTER heparin is stopped. Last doses on 03/28/2021.  Take 2 Tablets by mouth 2 Times a Day for 7 days. Start 03/21/2021 3-4 hours AFTER heparin is stopped. Last doses on 03/28/2021.  Dose: 10 mg     * apixaban 5mg Tabs  Start taking on: March 29, 2021  Commonly known as: ELIQUIS   Doctor's comments: Started 5mg BID AFTER the 7 day course of 10mg BID Eliquis. Staarting 03/29/2021.  Take 1 tablet by mouth 2 Times a Day.  Starting 03/29/2021.  Dose: 5 mg         * This list has 2 medication(s) that are the same as other medications prescribed for you. Read the directions carefully, and ask your doctor or other care provider to review them with you.            CHANGE how you take these medications      Instructions   amLODIPine 5 MG Tabs  What changed: when to take this  Commonly known as: NORVASC   Doctor's comments: Do not fill until patient request thank you  Take 1 tablet by mouth every day.  Dose: 5 mg     ipratropium 0.06 % Soln  What changed:   · when to take this  · reasons to take this  Commonly known as: ATROVENT   Spray 2 Sprays in nose 2 Times a Day.  Dose: 2 Spray     losartan 100 MG Tabs  What changed: when to take this  Commonly known as: COZAAR   Take 1 Tab by mouth every evening.  Dose: 100 mg        CONTINUE taking these medications      Instructions   acetaminophen 500 MG Tabs  Commonly known as: TYLENOL   Take 1,000 mg by mouth 3 times a day.  Dose: 1,000 mg     atorvastatin 80 MG tablet  Commonly known as: LIPITOR   Take 1 Tab by mouth every evening.  Dose: 80 mg     HYDROcodone-acetaminophen 5-325 MG Tabs per tablet  Commonly known as: NORCO   Take 1 tablet by mouth every 6 hours as needed. Indications: Pain  Dose: 1 tablet     loratadine 10 MG Tabs  Commonly known as: CLARITIN   Take 10 mg by mouth every evening.  Dose: 10 mg     metoprolol tartrate 25 MG Tabs  Commonly known as: LOPRESSOR   TAKE 1.5 TABS BY MOUTH 2 TIMES A DAY.  Dose:  37.5 mg     nitroglycerin 0.4 MG Subl  Commonly known as: NITROSTAT   Place 1 Tab under tongue as needed for Chest Pain. Up to 3 every five minutes  Dose: 0.4 mg     omeprazole 20 MG delayed-release capsule  Commonly known as: PRILOSEC   Take 1 Cap by mouth every day.  Dose: 20 mg     tadalafil 20 MG tablet  Commonly known as: Cialis   Take 1 Tab by mouth 1 time daily as needed for Erectile Dysfunction.  Dose: 20 mg        STOP taking these medications    aspirin 81 MG EC tablet     ondansetron 4 MG Tbdp  Commonly known as: ZOFRAN ODT            Allergies  Allergies   Allergen Reactions   • Aspirin      INTERNAL BLEEDING, 325 mg, pt reports he is ok to take ASPRIN 81MG only if he takes OMEPRAZOLE with his ASPIRIN   • Augmentin Vomiting   • Clindamycin      Develop c.diff   • Metronidazole      Not sure   • Nsaids Unspecified     GI bleed   • Vancomycin Swelling   • Ace Inhibitors Cough       DIET  Orders Placed This Encounter   Procedures   • Diet Order Diet: Consistent CHO (Diabetic)     Standing Status:   Standing     Number of Occurrences:   1     Order Specific Question:   Diet:     Answer:   Consistent CHO (Diabetic) [4]     IMAGING  IR-LIVER BX WITH OTHER STUDY   Final Result      ULTRASOUND GUIDED LIVER MASS BIOPSY.      IR-INSERT IVC FILTER WITH IG & SI   Final Result         1. ULTRASOUND AND FLUOROSCOPIC GUIDED PLACEMENT OF A OPTION ELITE CAVAL FILTER DEPLOYED IN THE INFRARENAL IVC.      2. INFERIOR VENA CAVAGRAM WITHIN NORMAL LIMITS WITH NO EVIDENCE OF CAVAL THROMBUS OR OCCLUSION.      EC-ECHOCARDIOGRAM COMPLETE W/O CONT   Final Result      US-EXTREMITY VENOUS LOWER BILAT   Final Result      DX-CHEST-LIMITED (1 VIEW)   Final Result      No evidence of acute cardiopulmonary process.            PHYSICAL EXAM     Physical Exam   Constitutional: He is oriented to person, place, and time and well-developed, well-nourished, and in no distress. No distress.   HENT:   Head: Normocephalic and atraumatic.   Eyes:  Pupils are equal, round, and reactive to light. Conjunctivae and EOM are normal.   Cardiovascular: Normal rate, regular rhythm and normal heart sounds. Exam reveals no gallop.   No murmur heard.  Pulmonary/Chest: Effort normal and breath sounds normal. No respiratory distress. He has no wheezes. He has no rales. He exhibits no tenderness.   Abdominal: Soft. Bowel sounds are normal. There is no abdominal tenderness. There is no rebound and no guarding.   Musculoskeletal:         General: No tenderness, deformity or edema. Normal range of motion.      Cervical back: Normal range of motion.   Neurological: He is alert and oriented to person, place, and time. He displays normal reflexes. No cranial nerve deficit. Gait normal. Coordination normal.   Skin: Skin is warm. No rash noted. He is not diaphoretic. No erythema. No pallor.   Psychiatric: Mood, memory, affect and judgment normal.       ACTIVITY  Light duty. for 7 days until patient sees Dr. Zhang and will defer physical activity to Oncology team.  Weight bearing as tolerated    CONSULTATIONS  Dr. Haq from Oncology was consulted    PROCEDURES  03/20: IVC Filter placed and Liver biopsy done, pending biopsy results

## 2021-03-21 NOTE — PROGRESS NOTES
Patient ambulated beside bed and desaturated on room air. 2L nasal cannula required to maintain o2 saturation greater than 90%

## 2021-03-21 NOTE — DISCHARGE PLANNING
Meds-to-Beds: Discharge prescription orders listed below delivered to patient's bedside. RN notified. Patient counseled.       Vinod Conrad   Home Medication Instructions SHAVONNE:[id number]    Printed on:03/21/21 1623   Medication Information                      apixaban (ELIQUIS) 5mg Tab  Take 2 Tablets by mouth 2 Times a Day for 7 days. Start 03/21/2021 3-4 hours AFTER heparin is stopped. Last doses on 03/28/2021.             apixaban (ELIQUIS) 5mg Tab  Take 1 tablet by mouth 2 Times a Day.  Starting 03/29/2021.                 Mallory Mai, PharmD

## 2021-03-21 NOTE — FACE TO FACE
"Face to Face Note  -  Durable Medical Equipment    Isai Redman M.D. - NPI: [id number]  I certify that this patient is under my care and that they had a durable medical equipment(DME)face to face encounter by myself that meets the physician DME face-to-face encounter requirements with this patient on:    Date of encounter:   Patient:                    MRN:                       YOB: 2022  Vinod Conrad  [id number]  1945     The encounter with the patient was in whole, or in part, for the following medical condition, which is the primary reason for durable medical equipment:  Other - Bilateral Pulmonary Embolisms and DVTs    I certify that, based on my findings, the following durable medical equipment is medically necessary:  Oxygen and Other DME Equipment - Pulse Ox.    HOME O2 Saturation Measurements:(Values must be present for Home Oxygen orders)     Patient desaturated to 85% on Room Air with ambulation and required 2L to maintain O2 saturation greater than 90%.       My Clinical findings support the need for the above equipment due to:  Hypoxia    Supporting Symptoms: The patient requires supplemental oxygen, as the following interventions have been tried with limited or no improvement: \"Ambulation with oximetry    If patient feels more short of breath, they can go up to 6 liters per minute and contact healthcare provider.    "

## 2021-03-21 NOTE — FACE TO FACE
"Face to Face Note  -  Durable Medical Equipment    Isai Redman M.D. - NPI: [id number]  I certify that this patient is under my care and that they had a durable medical equipment(DME)face to face encounter by myself that meets the physician DME face-to-face encounter requirements with this patient on:    Date of encounter:   Patient:                    MRN:                       YOB: 2022  Vniod Conrad  [id number]  1945     The encounter with the patient was in whole, or in part, for the following medical condition, which is the primary reason for durable medical equipment:  Other - Bilateral extensive Pulmonary embolisms and DVTs    I certify that, based on my findings, the following durable medical equipment is medically necessary:  Oxygen and Other DME Equipment - Pulse Ox.    HOME O2 Saturation Measurements:(Values must be present for Home Oxygen orders)  Room air sat at rest: 88  Room air sat with amb: 82  With liters of O2: 2, O2 sat at rest with O2: 95  With Liters of O2: 2, O2 sat with amb with O2 : 92  Is the patient mobile?: Yes    My Clinical findings support the need for the above equipment due to:  Hypoxia    Supporting Symptoms: The patient requires supplemental oxygen, as the following interventions have been tried with limited or no improvement: \"Ambulation with oximetry    If patient feels more short of breath, they can go up to 6 liters per minute and contact healthcare provider.  "

## 2021-03-21 NOTE — THERAPY
"Occupational Therapy   Initial Evaluation     Patient Name: Vinod Conrad  Age:  76 y.o., Sex:  male  Medical Record #: [id number]  Today's Date: 3/21/2021     Precautions: Fall Risk  Comments: acute DVTs, s/p IVC filter placement    Assessment  Patient is 76 y.o. male admitted after outside CT showed multiple bilateral PEs. Pt has known hx of recently diagnosed stage 4 pancreatic cancer with mets to the live, CAD, HTN, and found to have acute DVTs. Pt presents to OT eval with supportive wife at bedside, agreeable to gentle ADLs and open to education regarding DME and energy conservation strategies to increase safety of ADLs at home. Spouse plans to obtain a shower chair, pt's local son is very involved and recently assisted an aging in-law, he is able to assist as needed. No additional acute OT needs at this time.    Plan    Recommend Occupational Therapy for Evaluation only Patient will not be actively followed for occupational therapy services at this time, however may be seen if requested by physician for 1 more visit within 30 days to address any discharge or equipment needs.     DC Equipment Recommendations: Tub / Shower Seat, Raised Toilet Seat with Arms  Discharge Recommendations: Anticipate that the patient will have no further occupational therapy needs after discharge from the hospital     Subjective    \"I have been given too much information the last four days.\"     Objective       03/21/21 1421   Prior Living Situation   Prior Services None   Housing / Facility 1 Story House   Bathroom Set up Walk In Shower   Equipment Owned None   Lives with - Patient's Self Care Capacity Spouse   Comments wife present for eval   Prior Level of ADL Function   Comments independence   Prior Level of IADL Function   Home Management Independent   Shopping Independent   Prior Level Of Mobility Independent Without Device in Community;Independent Without Device in Home   Driving / Transportation Driving Independent "   Occupation (Pre-Hospital Vocational) Retired Due To Age   Precautions   Precautions Fall Risk   Comments acute DVTs, s/p IVC filter placement   Cognition    Comments pleasant and cooperative   Balance Assessment   Weight Shift Sitting Good   Weight Shift Standing Fair   Comments no AD   Bed Mobility    Supine to Sit Supervised   Sit to Supine Supervised   Scooting Supervised   ADL Assessment   Eating Modified Independent   Grooming Supervision;Standing   Upper Body Dressing Supervision   Lower Body Dressing Supervision   Functional Mobility   Sit to Stand Supervised   Bed, Chair, Wheelchair Transfer Supervised   Transfer Method Stand Step   Mobility no AD   Problem List   Problem List None

## 2021-03-21 NOTE — DISCHARGE PLANNING
Anticipated Discharge Disposition: Home with home O2    Action: Spoke with Stacey at [facility]. Stacey stated that the patient will have to sign ABN prior to O2 delivery.  Spoke with the patient and spouse at the bedside about ABN. The patient and spouse verbalized understanding and agreement. ABN faxed to Stacey at [phone].    Barriers to Discharge: O2 delivery    Plan: Will follow up with Stacey.

## 2021-03-21 NOTE — PROGRESS NOTES
Assumed care at 1915. Bedside report received from yarelis RN. Patient's chart and MAR reviewed. 12 hour chart check complete. Assessment complete, pt 6/10 abd pain at this time. Pt is awake in bed. Pt is A & O x 4. Patient was updated on plan of care for the day. Questions answered and concerns addressed.  Pt denies any additional needs at this time. White board updated. Call light, phone and personal belongings within reach. Bed alarm on and working appropriately. Vital signs stable.     Pt right groin site is clean, dry and intact. Liver biopsy site is intact, clean and dry.

## 2021-03-21 NOTE — DISCHARGE INSTRUCTIONS
Discharge Instructions    Discharged to home by car with relative. Discharged via wheelchair, hospital escort: Yes.  Special equipment needed: Oxygen    Be sure to schedule a follow-up appointment with your primary care doctor or any specialists as instructed.     Discharge Plan:   Diet Plan: Discussed  Activity Level: Discussed  Confirmed Follow up Appointment: Patient to Call and Schedule Appointment  Confirmed Symptoms Management: Discussed  Medication Reconciliation Updated: Yes  Influenza Vaccine Indication: Not indicated: Previously immunized this influenza season and > 8 years of age    I understand that a diet low in cholesterol, fat, and sodium is recommended for good health. Unless I have been given specific instructions below for another diet, I accept this instruction as my diet prescription.   Other diet: cardiac    Special Instructions: None    · Is patient discharged on Warfarin / Coumadin?   No     Depression / Suicide Risk    As you are discharged from this [facility] facility, it is important to learn how to keep safe from harming yourself.    Recognize the warning signs:  · Abrupt changes in personality, positive or negative- including increase in energy   · Giving away possessions  · Change in eating patterns- significant weight changes-  positive or negative  · Change in sleeping patterns- unable to sleep or sleeping all the time   · Unwillingness or inability to communicate  · Depression  · Unusual sadness, discouragement and loneliness  · Talk of wanting to die  · Neglect of personal appearance   · Rebelliousness- reckless behavior  · Withdrawal from people/activities they love  · Confusion- inability to concentrate     If you or a loved one observes any of these behaviors or has concerns about self-harm, here's what you can do:  · Talk about it- your feelings and reasons for harming yourself  · Remove any means that you might use to hurt yourself (examples: pills, rope, extension cords,  firearm)  · Get professional help from the community (Mental Health, Substance Abuse, psychological counseling)  · Do not be alone:Call your Safe Contact- someone whom you trust who will be there for you.  · Call your local CRISIS HOTLINE 726-6984 or 459-093-9504  · Call your local Children's Mobile Crisis Response Team [place] (101) 427-0421 or www.BlogGlue  · Call the toll free National Suicide Prevention Hotlines   · National Suicide Prevention Lifeline 071-225-OEAI (3151)  · National Hope Line Network 800-SUICIDE (730-7680)    Take Eliquis (Apixaban) 3-4 hours after Heparin drip is stopped. Take 10mg BID for 7 days and THEN 5mg BID for the rest of your life, unless instructed by your Oncologist. Follow up with your Oncologist this week regarding your liver biopsy, anticoagulation, and iron deficiency.      Apixaban oral tablets  What is this medicine?  APIXABAN (a PIX a ban) is an anticoagulant (blood thinner). It is used to lower the chance of stroke in people with a medical condition called atrial fibrillation. It is also used to treat or prevent blood clots in the lungs or in the veins.  This medicine may be used for other purposes; ask your health care provider or pharmacist if you have questions.  COMMON BRAND NAME(S): Eliquis  What should I tell my health care provider before I take this medicine?  They need to know if you have any of these conditions:  · antiphospholipid antibody syndrome  · bleeding disorders  · bleeding in the brain  · blood in your stools (black or tarry stools) or if you have blood in your vomit  · history of blood clots  · history of stomach bleeding  · kidney disease  · liver disease  · mechanical heart valve  · an unusual or allergic reaction to apixaban, other medicines, foods, dyes, or preservatives  · pregnant or trying to get pregnant  · breast-feeding  How should I use this medicine?  Take this medicine by mouth with a glass of water. Follow the directions on the  prescription label. You can take it with or without food. If it upsets your stomach, take it with food. Take your medicine at regular intervals. Do not take it more often than directed. Do not stop taking except on your doctor's advice. Stopping this medicine may increase your risk of a blood clot. Be sure to refill your prescription before you run out of medicine.  Talk to your pediatrician regarding the use of this medicine in children. Special care may be needed.  Overdosage: If you think you have taken too much of this medicine contact a poison control center or emergency room at once.  NOTE: This medicine is only for you. Do not share this medicine with others.  What if I miss a dose?  If you miss a dose, take it as soon as you can. If it is almost time for your next dose, take only that dose. Do not take double or extra doses.  What may interact with this medicine?  This medicine may interact with the following:  · aspirin and aspirin-like medicines  · certain medicines for fungal infections like ketoconazole and itraconazole  · certain medicines for seizures like carbamazepine and phenytoin  · certain medicines that treat or prevent blood clots like warfarin, enoxaparin, and dalteparin  · clarithromycin  · NSAIDs, medicines for pain and inflammation, like ibuprofen or naproxen  · rifampin  · ritonavir  · Jeniffer's wort  This list may not describe all possible interactions. Give your health care provider a list of all the medicines, herbs, non-prescription drugs, or dietary supplements you use. Also tell them if you smoke, drink alcohol, or use illegal drugs. Some items may interact with your medicine.  What should I watch for while using this medicine?  Visit your healthcare professional for regular checks on your progress. You may need blood work done while you are taking this medicine. Your condition will be monitored carefully while you are receiving this medicine. It is important not to miss any  appointments.  Avoid sports and activities that might cause injury while you are using this medicine. Severe falls or injuries can cause unseen bleeding. Be careful when using sharp tools or knives. Consider using an electric razor. Take special care brushing or flossing your teeth. Report any injuries, bruising, or red spots on the skin to your healthcare professional.  If you are going to need surgery or other procedure, tell your healthcare professional that you are taking this medicine.  Wear a medical ID bracelet or chain. Carry a card that describes your disease and details of your medicine and dosage times.  What side effects may I notice from receiving this medicine?  Side effects that you should report to your doctor or health care professional as soon as possible:  · allergic reactions like skin rash, itching or hives, swelling of the face, lips, or tongue  · signs and symptoms of bleeding such as bloody or black, tarry stools; red or dark-brown urine; spitting up blood or brown material that looks like coffee grounds; red spots on the skin; unusual bruising or bleeding from the eye, gums, or nose  · signs and symptoms of a blood clot such as chest pain; shortness of breath; pain, swelling, or warmth in the leg  · signs and symptoms of a stroke such as changes in vision; confusion; trouble speaking or understanding; severe headaches; sudden numbness or weakness of the face, arm or leg; trouble walking; dizziness; loss of coordination  This list may not describe all possible side effects. Call your doctor for medical advice about side effects. You may report side effects to FDA at 4-114-RAP-3069.  Where should I keep my medicine?  Keep out of the reach of children.  Store at room temperature between 20 and 25 degrees C (68 and 77 degrees F). Throw away any unused medicine after the expiration date.  NOTE: This sheet is a summary. It may not cover all possible information. If you have questions about this  medicine, talk to your doctor, pharmacist, or health care provider.  © 2020 [employer] (2019-08-28 17:39:34)      Pancreatic Cancer    The pancreas is a gland in the abdomen between the stomach and the spine. The pancreas makes hormones that control blood sugar and helps the body use and store energy that comes from food. The pancreas also makes enzymes that help digest food. Pancreatic cancer is when there is a tumor in the pancreas that is cancerous (malignant).  There are two types of pancreatic cancer:  · Exocrine. This is the most common type.  · Endocrine. This is also called islet cell cancer.  Pancreatic cancer can spread (metastasize) to other parts of the body.  What are the causes?  The exact cause of this condition is not known.  What increases the risk?  The following factors may make you more likely to develop this condition:  · Being over 65 years old.  · Smoking cigarettes.  · Having a family history of cancer of the pancreas, colon, or ovaries.  · Having diabetes.  · Having long-term inflammation of the pancreas (chronic pancreatitis).  · Being exposed to certain chemicals.  · Being obese and having a decreased level of physical activity.  · Eating a diet that is high in fat and red meat.  · Having certain hereditary conditions.  What are the signs or symptoms?  In the early stages, there are often no symptoms of this condition. As the cancer gets worse, symptoms may vary depending on the type of pancreatic cancer you have. Common symptoms include:  · Nausea and vomiting.  · Loss of appetite and unintended weight loss.  · Pain in the upper abdomen or upper back.  · Skin or the white parts of the eyes turning yellow (jaundice).  · Fatigue.  Other symptoms include:  · Itchy skin.  · Dark urine.  · Stools that are light-colored and greasy-looking, or stools that are black and tarry-looking.  · A lump under the rib cage on the right side.  · High blood sugar (hyperglycemia). This may cause  increased thirst and frequent urination.  · Low blood sugar (hypoglycemia). This may cause confusion, sweating, and a fast heartbeat.  · Depression.  How is this diagnosed?  This condition may be diagnosed based on your medical history and a physical exam. Your health care provider may check your:  · Skin and eyes for signs of jaundice.  · Abdomen for any changes in the areas near the pancreas. Your health care provider may also check for excess fluid in the abdomen.  You may also have other tests, including:  · Blood and urine tests.  · Genetic testing.  · Ultrasound.  · CT scan.  · MRI.  · A biopsy. A sample of pancreatic tissue would be removed and looked at under a microscope.  If pancreatic cancer is diagnosed, it will be staged to determine its severity and extent. Staging is an assessment of:  · The size of the tumor.  · If the cancer has spread.  · Where the cancer has spread.  How is this treated?  Depending on the type and stage of your pancreatic cancer, treatment may include:  · Surgery to remove all or part of the pancreas, or to remove the tumor.  · Chemotherapy. This uses medicine to destroy the cancer cells.  · Radiation therapy. This uses high-energy beams to kill cancer cells.  · Medicine to attack a tumor's genes and proteins (targeted therapy). These medicines attack the genes and proteins that allow a tumor to grow while limiting damage to healthy cells.  · Participating in clinical trials to see if new (experimental) treatments are effective.  · Medicines to help manage pain and other symptoms.  Your health care provider may recommend a combination of surgery, radiation therapy, and chemotherapy. You may be referred to a health care provider who specializes in cancer (oncologist).  Follow these instructions at home:  Medicines  · Take over-the-counter and prescription medicines only as told by your health care provider.  · Ask your health care provider about changing or stopping your regular  medicines. This is especially important if you are taking diabetes medicines or blood thinners.  · Do not take dietary supplements or herbal medicines unless your health care provider tells you to take them. Some supplements can interfere with how well the treatment works.  · Ask your health care provider if the medicine prescribed to you:  ? Requires you to avoid driving or using heavy machinery.  ? Can cause constipation. You may need to take these actions to prevent or treat constipation:  § Drink enough fluid to keep your urine pale yellow.  § Take over-the-counter or prescription medicines.  § Eat foods that are high in fiber, such as beans, whole grains, and fresh fruits and vegetables.  § Limit foods that are high in fat and processed sugars, such as fried or sweet foods.  Lifestyle  · Get enough sleep on a regular basis. Most adults need 6-8 hours of sleep each night. During treatment, you may need more sleep.  · Rest as told by your health care provider.  · Consider joining a cancer support group. Ask your health care provider for more information about local and online support groups. This may help you learn to cope with the stress of having pancreatic cancer.  · Do not use any products that contain nicotine or tobacco, such as cigarettes, e-cigarettes, and chewing tobacco. If you need help quitting, ask your health care provider.  Eating and drinking  · Try to eat regular, healthy meals. Some of your treatments might affect your appetite. If you are having problems eating or with your appetite, ask to meet with a food and nutrition specialist (dietitian).  · Do not drink alcohol.  General instructions  · Return to your normal activities as told by your health care provider. Ask your health care provider what activities are safe for you.  · Work with your health care provider to manage any side effects of your treatment.  · Keep all follow-up visits as told by your health care provider. This is  important.  Where to find more information  · American Cancer Society: https://www.cancer.org  · National Cancer [place] (NCI): https://www.cancer.gov  Contact a health care provider if:  · You have unexplained weight loss.  Get help right away if:  · Your pain suddenly gets worse.  · Your skin or eyes turn more yellow.  · You cannot eat or drink without vomiting.  · You have:  ? Trouble breathing.  ? Chest pain or an irregular heartbeat.  ? Blood in your vomit or dark, tarry stools.  ? New fatigue or weakness.  ? Abdominal bloating or pain.  These symptoms may represent a serious problem that is an emergency. Do not wait to see if the symptoms will go away. Get medical help right away. Call your local emergency services (911 in the [place] Do not drive yourself to the hospital.  Summary  · Pancreatic cancer is a tumor in the pancreas that is cancerous (malignant).  · Risk factors include having a family history of cancer of the pancreas, colon, or ovaries. Risk factors also include having long-term inflammation of the pancreas (chronic pancreatitis) and diabetes.  · Treatment may include a combination of surgery, medicine to destroy cancer cells (chemotherapy), and high-energy beams to kill cancer cells (radiation therapy).  · Consider joining a cancer support group. This may help you learn to cope with the stress of having pancreatic cancer.  · Keep all follow-up visits as told by your health care provider. This is important.  This information is not intended to replace advice given to you by your health care provider. Make sure you discuss any questions you have with your health care provider.  Document Released: 12/02/2005 Document Revised: 03/19/2020 Document Reviewed: 03/26/2020  [employer] Patient Education © 2020 [employer]      Metastatic Cancer    Metastatic cancer is cancer that has spread from the place where it started (primary site) to another part of the body. The process of cancer spreading from  the primary site is called metastasis. When cancer cells metastasize, they do not change the way they look or the way they affect the body. An example is when primary lung cancer spreads to the brain. This is called metastatic lung cancer, not brain cancer.  All types of cancer can spread. Some cancers are more likely to metastasize than others. The most common places that cancers metastasize to are:  · Bones.  · Liver.  · Lungs.  What are the causes?  Metastasis occurs when cancer cells spread from the primary site to another part of the body. Cancer cells can spread:  · Directly from one part of the body to a nearby area (local invasion).  · Into a lymph vessel. Cancer cells can be carried through the lymph system to lymph nodes and other parts of the body. The lymph system is a network of vessels and nodes that help protect against infections.  · Into the blood vessels. Cancer cells can be carried to other parts of the body through the bloodstream.  What increases the risk?  The following factors may make you more likely to develop this condition:  · The type of cancer that you have.  · The stage and grade of your primary cancer at the time of diagnosis.  · The grade and stage of the tumor. Grading and staging predict how quickly cancer cells will grow and their chances of metastasis.  · A large primary tumor.  · A higher grade of tumor.  · Deeper growth of tumor.  · A tumor that has entered the lymph system.  What are the signs or symptoms?  Symptoms of this condition include:  · Weakness.  · Lack of energy.  · Pain.  · Weight loss.  · Trouble breathing.  · Fluid buildup in your lungs or abdomen.  · Tumor growths that can be felt or seen.  · An enlarged liver.  Some people with this condition may have no symptoms.  How is this diagnosed?  This condition may be diagnosed based on:  · Your symptoms.  · Physical exam. This may include:  ? Blood tests to check for certain substances that are secreted by tumors  (tumor markers).  § Tumor markers that increase after treatment can indicate metastasis.  § Tumor markers may be used to help diagnose metastasis in some cancers, such as colon and prostate cancer.  § Not all cancers have tumor markers.  ? Imaging studies, such as:  § X-rays.  § Ultrasound.  § MRI.  § Other imaging tests, such as CT scans, bone scans, and PET scans.  ? Testing tissue that is removed from the new cancer site (biopsy). If the cells are similar to cancer cells from the primary site, this can confirm metastatic cancer.  ? Testing fluid samples from the lungs, spine, or abdomen for metastatic cancer cells.  How is this treated?  There are many options for treating metastatic cancer. Your treatment will depend on:  · The type of cancer you have.  · How far your cancer has advanced.  · Your general health.  Treatment may not be able to cure metastatic cancer, but it can often relieve the symptoms. In many cases, you may have a combination of treatments. Options may include:  · Surgery.  · Medicines that kill cancer cells (chemotherapy).  · High-energy rays that kill cancer cells (radiation therapy).  · Targeted therapy. This targets specific parts of cancer cells and the area around them to block the growth and the spread of the cancer. Targeted therapy can help to limit the damage to healthy cells.  · Hormone therapy.  · Treatments that help your body fight cancer (biologic therapy).  · Medicines that help your body's disease-fighting system (immune system) fight cancer cells (immunotherapy).  · Freezing cancer cells using gas or liquid that is delivered through a needle (cryoablation).  · Destroying cancer cells using high-energy radio waves that are delivered through a needle-like probe (radiofrequency ablation).  · A procedure to block the artery that supplies blood to the tumor, which kills the cancer cells (embolization).  · Other medicines to manage symptoms related to cancer or cancer  treatments.  Follow these instructions at home:  Eating and drinking  · Some of your treatments might affect your appetite and your ability to chew and swallow. If you are having problems eating, or if you do not have an appetite, meet with a diet and nutrition specialist (dietitian).  · If you have side effects that affect eating, it may help to:  ? Eat smaller meals and snacks often.  ? Drink high-nutrition and high-calorie shakes or supplements.  ? Eat bland and soft foods that are easy to eat.  ? Avoid foods that are hot, spicy, or hard to swallow.  Lifestyle    · Do not drink alcohol.  · Do not use any products that contain nicotine or tobacco, such as cigarettes and e-cigarettes. If you need help quitting, ask your health care provider.  General instructions  · Take over-the-counter and prescription medicines only as told by your health care provider. This includes vitamins, supplements, and herbal products.  · Work with your health care provider to manage any side effects of treatment.  · Keep all follow-up visits as told by your health care provider. This is important.  Where to find more information  · American Cancer Society: www.cancer.org  · National Cancer [place] (NCI): www.cancer.gov  Contact a health care provider if you:  · Notice that you bruise or bleed easily.  · Are losing weight without trying.  · Have new or increased fatigue or weakness.  Get help right away if you have:  · A seizure.  · A sudden increase in pain.  · A fever.  · Shortness of breath.  · Chest pain.  Summary  · Metastatic cancer is cancer that has spread from the place where it started (primary site) to another part of the body.  · Cancer cells can spread directly from one part of the body to a nearby area, or they may spread through the lymph system or the bloodstream.  · Your risk for metastatic cancer depends on the type of cancer you have and the stage and grade of your primary cancer.  · Treatment may not be able to cure  metastatic cancer, but it can often relieve the symptoms.  This information is not intended to replace advice given to you by your health care provider. Make sure you discuss any questions you have with your health care provider.  Document Released: 04/24/2006 Document Revised: 02/10/2020 Document Reviewed: 01/02/2019  Elsevier Patient Education © 2020 [employer]        Pulmonary Embolism    A pulmonary embolism (PE) is a sudden blockage or decrease of blood flow in one or both lungs. Most blockages come from a blood clot that forms in the vein of a lower leg, thigh, or arm (deep vein thrombosis, DVT) and travels to the lungs. A clot is blood that has thickened into a gel or solid. PE is a dangerous and life-threatening condition that needs to be treated right away.  What are the causes?  This condition is usually caused by a blood clot that forms in a vein and moves to the lungs. In rare cases, it may be caused by air, fat, part of a tumor, or other tissue that moves through the veins and into the lungs.  What increases the risk?  The following factors may make you more likely to develop this condition:  · Experiencing a traumatic injury, such as breaking a hip or leg.  · Having:  ? A spinal cord injury.  ? Orthopedic surgery, especially hip or knee replacement.  ? Any major surgery.  ? A stroke.  ? DVT.  ? Blood clots or blood clotting disease.  ? Long-term (chronic) lung or heart disease.  ? Cancer treated with chemotherapy.  ? A central venous catheter.  · Taking medicines that contain estrogen. These include birth control pills and hormone replacement therapy.  · Being:  ? Pregnant.  ? In the period of time after your baby is delivered (postpartum).  ? Older than age 60.  ? Overweight.  ? A smoker, especially if you have other risks.  What are the signs or symptoms?  Symptoms of this condition usually start suddenly and include:  · Shortness of breath during activity or at rest.  · Coughing, coughing up  blood, or coughing up blood-tinged mucus.  · Chest pain that is often worse with deep breaths.  · Rapid or irregular heartbeat.  · Feeling light-headed or dizzy.  · Fainting.  · Feeling anxious.  · Fever.  · Sweating.  · Pain and swelling in a leg. This is a symptom of DVT, which can lead to PE.  How is this diagnosed?  This condition may be diagnosed based on:  · Your medical history.  · A physical exam.  · Blood tests.  · CT pulmonary angiogram. This test checks blood flow in and around your lungs.  · Ventilation-perfusion scan, also called a lung VQ scan. This test measures air flow and blood flow to the lungs.  · An ultrasound of the legs.  How is this treated?  Treatment for this condition depends on many factors, such as the cause of your PE, your risk for bleeding or developing more clots, and other medical conditions you have. Treatment aims to remove, dissolve, or stop blood clots from forming or growing larger. Treatment may include:  · Medicines, such as:  ? Blood thinning medicines (anticoagulants) to stop clots from forming.  ? Medicines that dissolve clots (thrombolytics).  · Procedures, such as:  ? Using a flexible tube to remove a blood clot (embolectomy) or to deliver medicine to destroy it (catheter-directed thrombolysis).  ? Inserting a filter into a large vein that carries blood to the heart (inferior vena cava). This filter (vena cava filter) catches blood clots before they reach the lungs.  ? Surgery to remove the clot (surgical embolectomy). This is rare.  You may need a combination of immediate, long-term (up to 3 months after diagnosis), and extended (more than 3 months after diagnosis) treatments. Your treatment may continue for several months (maintenance therapy). You and your health care provider will work together to choose the treatment program that is best for you.  Follow these instructions at home:  Medicines  · Take over-the-counter and prescription medicines only as told by your  health care provider.  · If you are taking an anticoagulant medicine:  ? Take the medicine every day at the same time each day.  ? Understand what foods and drugs interact with your medicine.  ? Understand the side effects of this medicine, including excessive bruising or bleeding. Ask your health care provider or pharmacist about other side effects.  General instructions  · Wear a medical alert bracelet or carry a medical alert card that says you have had a PE and lists what medicines you take.  · Ask your health care provider when you may return to your normal activities. Avoid sitting or lying for a long time without moving.  · Maintain a healthy weight. Ask your health care provider what weight is healthy for you.  · Do not use any products that contain nicotine or tobacco, such as cigarettes, e-cigarettes, and chewing tobacco. If you need help quitting, ask your health care provider.  · Talk with your health care provider about any travel plans. It is important to make sure that you are still able to take your medicine while on trips.  · Keep all follow-up visits as told by your health care provider. This is important.  Contact a health care provider if:  · You missed a dose of your blood thinner medicine.  Get help right away if:  · You have:  ? New or increased pain, swelling, warmth, or redness in an arm or leg.  ? Numbness or tingling in an arm or leg.  ? Shortness of breath during activity or at rest.  ? A fever.  ? Chest pain.  ? A rapid or irregular heartbeat.  ? A severe headache.  ? Vision changes.  ? A serious fall or accident, or you hit your head.  ? Stomach (abdominal) pain.  ? Blood in your vomit, stool, or urine.  ? A cut that will not stop bleeding.  · You cough up blood.  · You feel light-headed or dizzy.  · You cannot move your arms or legs.  · You are confused or have memory loss.  These symptoms may represent a serious problem that is an emergency. Do not wait to see if the symptoms will go  away. Get medical help right away. Call your local emergency services (911 in the [place] Do not drive yourself to the hospital.  Summary  · A pulmonary embolism (PE) is a sudden blockage or decrease of blood flow in one or both lungs. PE is a dangerous and life-threatening condition that needs to be treated right away.  · Treatments for this condition usually include medicines to thin your blood (anticoagulants) or medicines to break apart blood clots (thrombolytics).  · If you are given blood thinners, it is important to take the medicine every day at the same time each day.  · Understand what foods and drugs interact with any medicines that you are taking.  · If you have signs of PE or DVT, call your local emergency services (911 in the [place]  This information is not intended to replace advice given to you by your health care provider. Make sure you discuss any questions you have with your health care provider.  Document Released: 12/15/2001 Document Revised: 09/25/2019 Document Reviewed: 09/25/2019  [employer] Patient Education © 2020 [employer]  Deep Vein Thrombosis    Deep vein thrombosis (DVT) is a condition in which a blood clot forms in a deep vein, such as a lower leg, thigh, or arm vein. A clot is blood that has thickened into a gel or solid. This condition is dangerous. It can lead to serious and even life-threatening complications if the clot travels to the lungs and causes a blockage (pulmonary embolism). It can also damage veins in the leg. This can result in leg pain, swelling, discoloration, and sores (post-thrombotic syndrome).  What are the causes?  This condition may be caused by:  · A slowdown of blood flow.  · Damage to a vein.  · A condition that causes blood to clot more easily, such as an inherited clotting disorder.  What increases the risk?  The following factors may make you more likely to develop this condition:  · Being overweight.  · Being older, especially over age 60.  · Sitting or  lying down for more than four hours.  · Being in the hospital.  · Lack of physical activity (sedentary lifestyle).  · Pregnancy, being in childbirth, or having recently given birth.  · Taking medicines that contain estrogen, such as medicines to prevent pregnancy.  · Smoking.  · A history of any of the following:  ? Blood clots or a blood clotting disease.  ? Peripheral vascular disease.  ? Inflammatory bowel disease.  ? Cancer.  ? Heart disease.  ? Genetic conditions that affect how your blood clots, such as Factor V Leiden mutation.  ? Neurological diseases that affect your legs (leg paresis).  ? A recent injury, such as a car accident.  ? Major or lengthy surgery.  ? A central line placed inside a large vein.  What are the signs or symptoms?  Symptoms of this condition include:  · Swelling, pain, or tenderness in an arm or leg.  · Warmth, redness, or discoloration in an arm or leg.  If the clot is in your leg, symptoms may be more noticeable or worse when you stand or walk. Some people may not develop any symptoms.  How is this diagnosed?  This condition is diagnosed with:  · A medical history and physical exam.  · Tests, such as:  ? Blood tests. These are done to check how well your blood clots.  ? Ultrasound. This is done to check for clots.  ? Venogram. For this test, contrast dye is injected into a vein and X-rays are taken to check for any clots.  How is this treated?  Treatment for this condition depends on:  · The cause of your DVT.  · Your risk for bleeding or developing more clots.  · Any other medical conditions that you have.  Treatment may include:  · Taking a blood thinner (anticoagulant). This type of medicine prevents clots from forming. It may be taken by mouth, injected under the skin, or injected through an IV (catheter).  · Injecting clot-dissolving medicines into the affected vein (catheter-directed thrombolysis).  · Having surgery. Surgery may be done to:  ? Remove the clot.  ? Place a filter  in a large vein to catch blood clots before they reach the lungs.  Some treatments may be continued for up to six months.  Follow these instructions at home:  If you are taking blood thinners:  · Take the medicine exactly as told by your health care provider. Some blood thinners need to be taken at the same time every day. Do not skip a dose.  · Talk with your health care provider before you take any medicines that contain aspirin or NSAIDs. These medicines increase your risk for dangerous bleeding.  · Ask your health care provider about foods and drugs that could change the way the medicine works (may interact). Avoid those things if your health care provider tells you to do so.  · Blood thinners can cause easy bruising and may make it difficult to stop bleeding. Because of this:  ? Be very careful when using knives, scissors, or other sharp objects.  ? Use an electric razor instead of a blade.  ? Avoid activities that could cause injury or bruising, and follow instructions about how to prevent falls.  · Wear a medical alert bracelet or carry a card that lists what medicines you take.  General instructions  · Take over-the-counter and prescription medicines only as told by your health care provider.  · Return to your normal activities as told by your health care provider. Ask your health care provider what activities are safe for you.  · Wear compression stockings if recommended by your health care provider.  · Keep all follow-up visits as told by your health care provider. This is important.  How is this prevented?  To lower your risk of developing this condition again:  · For 30 or more minutes every day, do an activity that:  ? Involves moving your arms and legs.  ? Increases your heart rate.  · When traveling for longer than four hours:  ? Exercise your arms and legs every hour.  ? Drink plenty of water.  ? Avoid drinking alcohol.  · Avoid sitting or lying for a long time without moving your legs.  · If you  have surgery or you are hospitalized, ask about ways to prevent blood clots. These may include taking frequent walks or using anticoagulants.  · Stay at a healthy weight.  · If you are a woman who is older than age 35, avoid unnecessary use of medicines that contain estrogen, such as some birth control pills.  · Do not use any products that contain nicotine or tobacco, such as cigarettes and e-cigarettes. This is especially important if you take estrogen medicines. If you need help quitting, ask your health care provider.  Contact a health care provider if:  · You miss a dose of your blood thinner.  · Your menstrual period is heavier than usual.  · You have unusual bruising.  Get help right away if:  · You have:  ? New or increased pain, swelling, or redness in an arm or leg.  ? Numbness or tingling in an arm or leg.  ? Shortness of breath.  ? Chest pain.  ? A rapid or irregular heartbeat.  ? A severe headache or confusion.  ? A cut that will not stop bleeding.  · There is blood in your vomit, stool, or urine.  · You have a serious fall or accident, or you hit your head.  · You feel light-headed or dizzy.  · You cough up blood.  These symptoms may represent a serious problem that is an emergency. Do not wait to see if the symptoms will go away. Get medical help right away. Call your local emergency services (911 in the [place] Do not drive yourself to the hospital.  Summary  · Deep vein thrombosis (DVT) is a condition in which a blood clot forms in a deep vein, such as a lower leg, thigh, or arm vein.  · Symptoms can include swelling, warmth, pain, and redness in your leg or arm.  · This condition may be treated with a blood thinner (anticoagulant medicine), medicine that is injected to dissolve blood clots,compression stockings, or surgery.  · If you are prescribed blood thinners, take them exactly as told.  This information is not intended to replace advice given to you by your health care provider. Make sure you  discuss any questions you have with your health care provider.  Document Released: 12/18/2006 Document Revised: 11/30/2018 Document Reviewed: 05/18/2018  [employer] Patient Education © 2020 [employer]

## 2021-03-21 NOTE — THERAPY
Physical Therapy   Initial Evaluation     Patient Name: Vinod Conrad  Age:  76 y.o., Sex:  male  Medical Record #: [id number]  Today's Date: 3/21/2021       Assessment  Patient is a 76 y.o. male admitted with B PE and B DVT, s/p IVC filter placement 3/20, recent diagnosis of stage 4 pancreatic CA with mets to liver. Pt seen for PT consult at this time. Pt performs all functional mobility without assist, ambulated without AD, no LOB, no incr SOB, 2L O2. Discussed energy conservation techniques and DME recommendations with pt and spouse. Answered all questions regarding return home, pt appears functionally capable at this time. Patient will not be actively followed for physical therapy services, however may be seen if requested by physician for 1 more visit within 30 days to address any discharge or equipment needs.     Plan  Recommend Physical Therapy for Evaluation only   DC Equipment Recommendations: Tub / Shower Seat  Discharge Recommendations: Anticipate that the patient will have no further physical therapy needs after discharge from the hospital        03/21/21 1419   Prior Living Situation   Prior Services None   Housing / Facility 1 Story House   Steps Into Home 0   Steps In Home 0   Bathroom Set up Walk In Shower   Equipment Owned None   Lives with - Spouse   Comments spouse present and supportive   Prior Level of Functional Mobility   Bed Mobility Independent   Transfer Status Independent   Ambulation Independent   Distance Ambulation (Feet) community distances   Assistive Devices Used None   Stairs Independent   Comments very active, like to hike   Balance Assessment   Sitting Balance (Static) Good   Sitting Balance (Dynamic) Fair +   Standing Balance (Static) Fair +   Standing Balance (Dynamic) Fair   Weight Shift Sitting Good   Weight Shift Standing Good   Comments no AD   Gait Analysis   Gait Level Of Assist Supervised   Assistive Device None   Distance (Feet) 400   Weight Bearing Status no  restrictions   Comments no LOB, no SOB   Bed Mobility    Supine to Sit Supervised   Sit to Supine Supervised   Scooting Supervised   Functional Mobility   Sit to Stand Supervised

## 2021-03-22 NOTE — PROGRESS NOTES
Patient discharged home independent. vss on 2L nc. Discharge instructions and education received. Belongings sent with patient. Home o2 and meds delivered to bedside. Assisted to private vehicle for transport home.

## 2021-04-02 NOTE — ED PROVIDER NOTES
ED Provider Note    Chief Complaint:   Leg pain and swelling    HPI:  Vinod Conrad is a very pleasant 76 year old gentleman who presents for evaluation of lower extremity pain and swelling.  He was recently diagnosed with metastatic pancreatic cancer, scheduled to begin chemotherapy on Monday, 2 days from now.  About 2 weeks ago he was diagnosed with DVT and pulmonary embolism, he is currently on Eliquis.  He has not missed any doses of his medication, but still has some swelling to the left lower extremity.  Additionally, he and his wife have noticed some discoloration to the right toes, and he has had some right foot pain, most noticeable at night.  He has not had any associated abdominal pain excepting his baseline abdominal discomfort that he has had since developing symptoms from pancreatic cancer.  His pain is well controlled at home.  He has not had any chest pain, no new or worsening shortness of breath.  He states he is tolerating the Eliquis well.  He has not had any associated fevers, however reports he did have a temperature of 100.4 at his primary care visit about 2 to 3 days ago.  Since that time he has been checking his temperature at home, he has not felt febrile, and is not had any elevated temperatures on home check.  He is unable to identify any alleviating factors, symptoms are somewhat exacerbated by weightbearing.    Review of Systems:  See HPI for pertinent positives and negatives. All other systems negative.    Past Medical History:   has a past medical history of Allergy, Anesthesia (2006), Arthritis, CAD (coronary artery disease) (08/15/2018), Cancer (HCC), CATARACT, Hyperlipidemia, Hypertension, Infectious disease (2011), Pain, and Unspecified hemorrhagic conditions (2006).    Social History:  Social History     Tobacco Use   • Smoking status: Former Smoker     Packs/day: 2.00     Years: 30.00     Pack years: 60.00     Types: Cigarettes     Quit date: 1/1/1998     Years since  "quittin.2   • Smokeless tobacco: Never Used   Substance and Sexual Activity   • Alcohol use: Yes     Alcohol/week: 0.0 oz     Comment: 5 per week   • Drug use: No   • Sexual activity: Not on file       Surgical History:   has a past surgical history that includes scleral buckling (2009); other orthopedic surgery (); other (); cervical foraminotomy (2013); and gastroscopy (N/A, 2018).    Current Medications:  Home Medications     Reviewed by Tania Rodriguez (Pharmacy Tech) on 21 at 1638  Med List Status: Complete   Medication Last Dose Status   acetaminophen (TYLENOL) 500 MG Tab 2021 Active   amLODIPine (NORVASC) 5 MG Tab 2021 Active   apixaban (ELIQUIS) 5mg Tab 2021 Active   atorvastatin (LIPITOR) 80 MG tablet 2021 Active   HYDROcodone-acetaminophen (NORCO) 5-325 MG Tab per tablet 2021 Active   ipratropium (ATROVENT) 0.06 % Solution Not Taking Active   losartan (COZAAR) 100 MG Tab 2021 Active   metoprolol (LOPRESSOR) 25 MG Tab 2021 Active   nitroglycerin (NITROSTAT) 0.4 MG SL Tab Not Taking Active   omeprazole (PRILOSEC) 20 MG delayed-release capsule 2021 Active   tadalafil (CIALIS) 20 MG tablet Not Taking Active                Allergies:  Allergies   Allergen Reactions   • Aspirin      INTERNAL BLEEDING, 325 mg, pt reports he is ok to take ASPRIN 81MG only if he takes OMEPRAZOLE with his ASPIRIN   • Augmentin Vomiting   • Clindamycin      Develop c.diff   • Metronidazole      Not sure   • Nsaids Unspecified     GI bleed   • Vancomycin Swelling   • Ace Inhibitors Cough       Physical Exam:  Vital Signs: /75   Pulse 82   Temp 36.9 °C (98.5 °F) (Temporal)   Resp 15   Ht 1.854 m (6' 1\")   Wt 88 kg (194 lb 0.1 oz)   SpO2 99%   BMI 25.60 kg/m²   Constitutional: Alert, no acute distress  HENT: Normocephalic, mask in place  Eyes: Pupils equal and reactive, normal conjunctiva  Neck: Supple, normal range of motion, no " stridor  Cardiovascular: Extremities are warm and well perfused, no murmur appreciated, normal cardiac auscultation  Pulmonary: No respiratory distress, normal work of breathing, no accessory muscule usage, breath sounds clear and equal bilaterally  Abdomen: Soft, mildly distended, non-tender to palpation, no peritoneal signs, no palpable masses  Skin: Warm, dry, no rashes or lesions  Musculoskeletal: Left lower extremity is visibly swollen and enlarged as compared to the right, with 1+ edema.  2+ left DP pulse.  Right lower extremity with some bluish discoloration to the toes and the plantar surface of the foot, DP pulse is present, though barely palpable.  Bilateral lower extremities without ulcerations or lesions.  Neurologic: Alert, oriented, normal speech, normal motor function  Psychiatric: Normal and appropriate mood and affect    Medical records reviewed for continuity of care.  Cancer care specialist note reviewed from 3/16/2021.  Patient noted to have weight loss and abdominal pain over the past 3 months, CT scan from 3/8/2021 showed a 4 cm pancreatic mass involving the body with invasion of the antral wall of the stomach and portal vein.  Numerous hepatic metastatic lesions noted.  Borderline CBD dilation of 6 mm.  Plan at that time was to arrange liver biopsy, as well as CT of the chest to finish staging, and plan for initiation of chemotherapy.  Started on hydrocodone for pain.  Possibility of perforation discussed due to invasion of the gastric antrum.  Megace was not started given the risk of DVT.    Labs:  Labs Reviewed   LACTIC ACID - Abnormal; Notable for the following components:       Result Value    Lactic Acid 2.8 (*)     All other components within normal limits   URINALYSIS - Abnormal; Notable for the following components:    Ketones Trace (*)     Protein 30 (*)     Bilirubin Moderate (*)     Nitrite Positive (*)     Leukocyte Esterase Trace (*)     All other components within normal limits  "   Narrative:     Indication for culture:->Evaluation for sepsis without a  clear source of infection   CBC WITH DIFFERENTIAL - Abnormal; Notable for the following components:    WBC 22.7 (*)     RBC 4.43 (*)     Hemoglobin 11.6 (*)     Hematocrit 38.5 (*)     MCH 26.2 (*)     MCHC 30.1 (*)     Neutrophils-Polys 82.10 (*)     Lymphocytes 6.00 (*)     Immature Granulocytes 1.40 (*)     Neutrophils (Absolute) 18.65 (*)     Monos (Absolute) 1.90 (*)     Immature Granulocytes (abs) 0.31 (*)     All other components within normal limits   COMP METABOLIC PANEL - Abnormal; Notable for the following components:    Sodium 130 (*)     Chloride 94 (*)     Glucose 249 (*)     AST(SGOT) 57 (*)     Alkaline Phosphatase 425 (*)     Albumin 2.5 (*)     Total Protein 5.9 (*)     All other components within normal limits   URINE MICROSCOPIC (W/UA) - Abnormal; Notable for the following components:    WBC 5-10 (*)     RBC 5-10 (*)     Hyaline Cast 11-20 (*)     All other components within normal limits    Narrative:     Indication for culture:->Evaluation for sepsis without a  clear source of infection   URINE CULTURE(NEW)    Narrative:     Indication for culture:->Evaluation for sepsis without a  clear source of infection   BLOOD CULTURE    Narrative:     Per Hospital Policy: Only change Specimen Src: to \"Line\" if  specified by physician order.   BLOOD CULTURE    Narrative:     Per Hospital Policy: Only change Specimen Src: to \"Line\" if  specified by physician order.   BLOOD CULTURE,HOLD   ESTIMATED GFR       Radiology:  US-EXTREMITY ARTERY LOWER UNILAT RIGHT   Final Result      US-EXTREMITY VENOUS LOWER UNILAT LEFT   Final Result      DX-CHEST-PORTABLE (1 VIEW)   Final Result      Right basilar atelectasis.           ED Medications Administered:  Medications   cefTRIAXone (ROCEPHIN) 2 g in  mL IVPB (0 g Intravenous Stopped 4/2/21 1700)       Differential diagnosis:  Worsening DVT, continued sequela of DVT, arterial " thromboembolism, arterial stenosis, worsening metastases or malignancy    MDM:  Mr. Conrad presents to the emergency department today for evaluation of lower extremity pain and swelling.  On physical exam, his left lower extremity is visibly swollen, consistent with his history of DVT.  He states that the swelling has been present for quite some time, and has not suddenly worsened.  However, his right lower extremity has a diminished pulse, and some bluish discoloration which is concerning for arterial embolism.  He also reports intermittent pain, though he is not having any right lower extremity pain at this time.    On laboratory evaluation, his white blood count is elevated to 22.7. Prior recent values range from 18.7 18.5. Hemoglobin is 11.6, not significantly changed from prior. Platelet count is within normal limits at 265. CMP demonstrates mild hyponatremia at 130, again not significantly changed from prior. Blood glucose is 249. AST and alkaline phosphatase are slightly elevated as well, again not significantly changed from 3/30/2021. Lactic acid is elevated to 2.8.  Sepsis protocol was initiated in triage, he did not meet any sepsis criteria at triage, however due to his history of malignancy nursing staff ordered sepsis protocol.  Urinalysis is nitrite positive, though negative for bacteria with few white blood cells and few red blood cells.    I reviewed his bilateral lower extremity ultrasound from 3/19/2021.  Right lower extremity demonstrated thrombus within the distal common femoral vein, femoral veins, and popliteal vein, as well as posterior tibial vein and soleal vein.  Left lower extremity demonstrated thrombus within the distal common femoral vein, femoral vein, profunda and popliteal vein, thrombus in the posterior tibial veins and soleal vein noted as well.    Left lower extremity ultrasound today demonstrates an acute versus chronic nonocclusive thrombus in the mid distal common femoral  vein, proximal distal femoral, and the posterior tibial and peroneal veins. Right arterial lower extremity ultrasound demonstrates dense plaque throughout the right common femoral and proximal femoral arteries, with significant stenosis of the proximal femoral artery with monophasic flow seen throughout the right leg.    At this time, patient absolutely does not want to be hospitalized.  I discussed his presentation with Dr. Tolentino reviewed his ultrasound.  He does currently have flow throughout the leg, and is stable to follow-up closely as an outpatient.  He is referred to vascular surgery, counseled to call them Monday morning to schedule a follow-up appointment.    With regard to his leukocytosis, this is slightly elevated from prior.  On further discussion, he reported his temperature of 100.4 on an outpatient basis.  Due to elevated white blood count, and elevated lactic acid, I did order a dose of Rocephin, though at this time there is no clear source of infection.  On arrival to the emergency department his vital signs have all been reassuring, he has not had any tachycardia, no hypotension, no hypoxia, and no fever.    I did offer admission given his elevated leukocytosis, and reported fever 2 to 3 days ago.  He reports that he does not want to stay in the hospital, explains that he has terminal cancer, and wants to remain at home as much as possible.  Given his very well appearance at this time, and his understanding of disease, I believe this is a very reasonable request.  Unfortunately, we are not able to secure close outpatient follow-up, as it is Friday and his oncology clinic would not be open tomorrow.  I discussed his lab results with Dr. Zhang, at this time urine cultures and blood cultures are pending.  He is in agreement that empiric antibiotics at this time are reasonable, especially while awaiting cultures.  Recommends a 5-day course of Levaquin.  I did discuss strict return precautions with  Mr. Rey and his wife, and explained the need to return immediately if he develops any new or worsening symptoms, or if he develops a fever over the next 2 days.  As long as he is feeling well, he will call Dr. Zhang's clinic Monday morning for close follow-up. Return precautions were discussed with the patient, and provided in written form with the patient's discharge instructions.       Personal protective equipment including N95 surgical respirator, goggles, and gloves were used during this encounter.       Disposition:  Discharge home in stable condition    Final Impression:  1. Leg swelling    2. Femoral artery stenosis, right (HCC)    3. Leukocytosis, unspecified type        Electronically signed by: Sujata John MD, 4/2/2021 9:01 PM

## 2021-04-02 NOTE — ED NOTES
Patient to triage via wheelchair accompanied by ED Tech.    IV access placed and blood rainbow and cultures x 2 drawn per protocol and sent to lab.    Triage process explained to patient, apologized for wait time, and placed in Family Waiting Room.

## 2021-04-02 NOTE — ED NOTES
Break RN note:  Pt arrived to room by w/c. instructed to change to gown. Wife at bedside.  IV and labs obtained by triage RN.

## 2021-04-02 NOTE — ED TRIAGE NOTES
Pt to triage .  Chief Complaint   Patient presents with   • Sent by MD   • Foot Pain     left foot pain/swelling/discoloration    • Leg Swelling     left foot/leg swelling hst of multiple DVT's to bilateral legs    pt placed in family room to wait for room

## 2021-04-02 NOTE — DISCHARGE INSTRUCTIONS
Please call Monday morning to schedule a follow-up appointment with your oncologist for complete recheck.  Please call the vascular surgeon listed above with [facility] on Monday morning to schedule a follow-up appointment to evaluate your right leg.  Return to the emergency department immediately if you develop any new or worsening symptoms.  This includes abdominal pain, if you have any temperatures at home over 100.4, if you develop any worsening leg pain or leg swelling, you notice any worsening skin changes to your legs, if you have any headaches, neck or back pain, or if you have any further concerns.  Please take the antibiotics as prescribed.

## 2021-04-03 NOTE — ED NOTES
Given discharge instructions, verbalized understanding, left with all belongings, wheeled to ED lobby. Pt on portable oxygen.
